# Patient Record
Sex: MALE | Race: WHITE | NOT HISPANIC OR LATINO | Employment: PART TIME | URBAN - METROPOLITAN AREA
[De-identification: names, ages, dates, MRNs, and addresses within clinical notes are randomized per-mention and may not be internally consistent; named-entity substitution may affect disease eponyms.]

---

## 2017-04-03 ENCOUNTER — HOSPITAL ENCOUNTER (OUTPATIENT)
Dept: FAMILY MEDICINE CLINIC | Facility: CLINIC | Age: 52
Setting detail: SPECIMEN
Discharge: HOME | End: 2017-04-03
Attending: FAMILY MEDICINE | Admitting: FAMILY MEDICINE

## 2017-04-03 LAB
ALBUMIN SERPL-MCNC: 4.3 G/DL (ref 3.5–4.8)
ALBUMIN/GLOB SERPL: 1.7 {RATIO} (ref 1–1.7)
ALP SERPL-CCNC: 70 IU/L (ref 32–91)
ALT SERPL-CCNC: 25 IU/L (ref 17–63)
ANION GAP SERPL CALC-SCNC: 14.8 MMOL/L (ref 10–20)
AST SERPL-CCNC: 25 IU/L (ref 15–41)
BASOPHILS # BLD AUTO: 0.1 10*3/UL (ref 0–0.2)
BASOPHILS NFR BLD AUTO: 1 % (ref 0–2)
BILIRUB SERPL-MCNC: 0.5 MG/DL (ref 0.3–1.2)
BUN SERPL-MCNC: 12 MG/DL (ref 8–20)
BUN/CREAT SERPL: 10 (ref 6.2–20.3)
CALCIUM SERPL-MCNC: 9.6 MG/DL (ref 8.9–10.3)
CHLORIDE SERPL-SCNC: 107 MMOL/L (ref 101–111)
CHOLEST SERPL-MCNC: 184 MG/DL
CHOLEST/HDLC SERPL: 4.8 {RATIO}
CONV CO2: 26 MMOL/L (ref 22–32)
CONV LDL CHOLESTEROL DIRECT: 105 MG/DL (ref 0–100)
CONV TOTAL PROTEIN: 6.9 G/DL (ref 6.1–7.9)
CREAT UR-MCNC: 1.2 MG/DL (ref 0.7–1.2)
DIFFERENTIAL METHOD BLD: (no result)
EOSINOPHIL # BLD AUTO: 0.3 10*3/UL (ref 0–0.3)
EOSINOPHIL # BLD AUTO: 6 % (ref 0–3)
ERYTHROCYTE [DISTWIDTH] IN BLOOD BY AUTOMATED COUNT: 13.5 % (ref 11.5–14.5)
GLOBULIN UR ELPH-MCNC: 2.6 G/DL (ref 2.5–3.8)
GLUCOSE SERPL-MCNC: 96 MG/DL (ref 65–99)
HCT VFR BLD AUTO: 48.1 % (ref 40–54)
HDLC SERPL-MCNC: 38 MG/DL
HGB BLD-MCNC: 16.4 G/DL (ref 14–18)
LDLC/HDLC SERPL: 2.7 {RATIO}
LIPID INTERPRETATION: ABNORMAL
LYMPHOCYTES # BLD AUTO: 2.2 10*3/UL (ref 0.8–4.8)
LYMPHOCYTES NFR BLD AUTO: 38 % (ref 18–42)
MCH RBC QN AUTO: 29.8 PG (ref 26–32)
MCHC RBC AUTO-ENTMCNC: 34.1 G/DL (ref 32–36)
MCV RBC AUTO: 87.2 FL (ref 80–94)
MONOCYTES # BLD AUTO: 0.5 10*3/UL (ref 0.1–1.3)
MONOCYTES NFR BLD AUTO: 8 % (ref 2–11)
NEUTROPHILS # BLD AUTO: 2.7 10*3/UL (ref 2.3–8.6)
NEUTROPHILS NFR BLD AUTO: 47 % (ref 50–75)
NRBC BLD AUTO-RTO: 0 /100{WBCS}
NRBC/RBC NFR BLD MANUAL: 0 10*3/UL
PLATELET # BLD AUTO: 185 10*3/UL (ref 150–450)
PMV BLD AUTO: 11.2 FL (ref 7.4–10.4)
POTASSIUM SERPL-SCNC: 4.8 MMOL/L (ref 3.6–5.1)
PSA SERPL-MCNC: 0.76 NG/ML (ref 0–4)
RBC # BLD AUTO: 5.51 10*6/UL (ref 4.6–6)
SODIUM SERPL-SCNC: 143 MMOL/L (ref 136–144)
T3FREE SERPL-MCNC: 3.69 PG/ML (ref 2.39–6.79)
T4 FREE SERPL-MCNC: 0.62 NG/DL (ref 0.58–1.64)
TRIGL SERPL-MCNC: 198 MG/DL
TSH SERPL-ACNC: 3.13 UIU/ML (ref 0.34–5.6)
VLDLC SERPL CALC-MCNC: 41.1 MG/DL
WBC # BLD AUTO: 5.7 10*3/UL (ref 4.5–11.5)

## 2017-05-05 ENCOUNTER — HOSPITAL ENCOUNTER (OUTPATIENT)
Dept: CARDIOLOGY | Facility: HOSPITAL | Age: 52
Discharge: HOME | End: 2017-05-05
Attending: INTERNAL MEDICINE | Admitting: INTERNAL MEDICINE

## 2018-01-25 ENCOUNTER — HOSPITAL ENCOUNTER (OUTPATIENT)
Dept: FAMILY MEDICINE CLINIC | Facility: CLINIC | Age: 53
Setting detail: SPECIMEN
Discharge: HOME | End: 2018-01-25

## 2018-01-25 LAB
INR PPP: 2.1 (ref 2–3)
PROTHROMBIN TIME: 21.8 SEC (ref 19.4–28.5)

## 2019-06-01 ENCOUNTER — TRANSCRIBE ORDERS (OUTPATIENT)
Dept: CARDIOLOGY | Facility: CLINIC | Age: 54
End: 2019-06-01

## 2019-06-01 DIAGNOSIS — I48.20 CHRONIC ATRIAL FIBRILLATION (HCC): Primary | ICD-10-CM

## 2019-10-28 PROBLEM — I10 HYPERTENSION: Status: ACTIVE | Noted: 2018-04-26

## 2019-10-28 PROBLEM — I42.2: Status: ACTIVE | Noted: 2017-03-29

## 2019-10-28 PROBLEM — G47.30 SLEEP APNEA: Status: ACTIVE | Noted: 2018-04-26

## 2019-10-28 PROBLEM — Z79.01 LONG TERM CURRENT USE OF ANTICOAGULANT: Status: ACTIVE | Noted: 2017-03-29

## 2019-10-28 PROBLEM — I48.20 ATRIAL FIBRILLATION, CHRONIC: Status: ACTIVE | Noted: 2017-03-29

## 2019-11-04 ENCOUNTER — APPOINTMENT (OUTPATIENT)
Dept: CARDIOLOGY | Facility: HOSPITAL | Age: 54
End: 2019-11-04

## 2019-11-06 ENCOUNTER — OFFICE VISIT (OUTPATIENT)
Dept: CARDIOLOGY | Facility: CLINIC | Age: 54
End: 2019-11-06

## 2019-11-06 ENCOUNTER — HOSPITAL ENCOUNTER (OUTPATIENT)
Dept: CARDIOLOGY | Facility: HOSPITAL | Age: 54
Discharge: HOME | End: 2019-11-06
Admitting: INTERNAL MEDICINE

## 2019-11-06 VITALS
WEIGHT: 250 LBS | DIASTOLIC BLOOD PRESSURE: 55 MMHG | SYSTOLIC BLOOD PRESSURE: 107 MMHG | BODY MASS INDEX: 35.79 KG/M2 | HEIGHT: 70 IN

## 2019-11-06 VITALS
WEIGHT: 256 LBS | HEART RATE: 71 BPM | DIASTOLIC BLOOD PRESSURE: 80 MMHG | OXYGEN SATURATION: 96 % | SYSTOLIC BLOOD PRESSURE: 123 MMHG | HEIGHT: 70 IN | BODY MASS INDEX: 36.65 KG/M2

## 2019-11-06 DIAGNOSIS — I48.20 CHRONIC ATRIAL FIBRILLATION (HCC): ICD-10-CM

## 2019-11-06 DIAGNOSIS — I42.2 PRIMARY IDIOPATHIC HYPERTROPHIC CARDIOMYOPATHY (HCC): ICD-10-CM

## 2019-11-06 DIAGNOSIS — I48.20 ATRIAL FIBRILLATION, CHRONIC (HCC): Primary | ICD-10-CM

## 2019-11-06 DIAGNOSIS — G47.33 OBSTRUCTIVE SLEEP APNEA SYNDROME: ICD-10-CM

## 2019-11-06 DIAGNOSIS — I10 ESSENTIAL HYPERTENSION: ICD-10-CM

## 2019-11-06 LAB
BH CV ECHO MEAS - ACS: 2.2 CM
BH CV ECHO MEAS - AO MAX PG (FULL): 3.1 MMHG
BH CV ECHO MEAS - AO MAX PG: 7.8 MMHG
BH CV ECHO MEAS - AO MEAN PG (FULL): 1.8 MMHG
BH CV ECHO MEAS - AO MEAN PG: 4.6 MMHG
BH CV ECHO MEAS - AO ROOT AREA: 6.8 CM^2
BH CV ECHO MEAS - AO ROOT DIAM: 2.9 CM
BH CV ECHO MEAS - AO V2 MAX: 139.4 CM/SEC
BH CV ECHO MEAS - AO V2 MEAN: 103.3 CM/SEC
BH CV ECHO MEAS - AO V2 VTI: 25.5 CM
BH CV ECHO MEAS - ASC AORTA: 3.2 CM
BH CV ECHO MEAS - AVA(I,A): 2.5 CM^2
BH CV ECHO MEAS - AVA(I,D): 2.5 CM^2
BH CV ECHO MEAS - AVA(V,A): 2.3 CM^2
BH CV ECHO MEAS - AVA(V,D): 2.3 CM^2
BH CV ECHO MEAS - EDV(CUBED): 110 ML
BH CV ECHO MEAS - EDV(MOD-SP4): 62.1 ML
BH CV ECHO MEAS - EDV(TEICH): 107.1 ML
BH CV ECHO MEAS - EF(CUBED): 82.9 %
BH CV ECHO MEAS - EF(MOD-SP4): 66.6 %
BH CV ECHO MEAS - EF(TEICH): 75.7 %
BH CV ECHO MEAS - ESV(CUBED): 18.8 ML
BH CV ECHO MEAS - ESV(MOD-SP4): 20.7 ML
BH CV ECHO MEAS - ESV(TEICH): 26.1 ML
BH CV ECHO MEAS - FS: 44.5 %
BH CV ECHO MEAS - IVS/LVPW: 1.6
BH CV ECHO MEAS - IVSD: 1.5 CM
BH CV ECHO MEAS - LA DIMENSION: 5.7 CM
BH CV ECHO MEAS - LA/AO: 1.9
BH CV ECHO MEAS - LV MASS(C)D: 232.4 GRAMS
BH CV ECHO MEAS - LV MAX PG: 4.7 MMHG
BH CV ECHO MEAS - LV MEAN PG: 2.7 MMHG
BH CV ECHO MEAS - LV V1 MAX: 108.4 CM/SEC
BH CV ECHO MEAS - LV V1 MEAN: 80.2 CM/SEC
BH CV ECHO MEAS - LV V1 VTI: 21.3 CM
BH CV ECHO MEAS - LVIDD: 4.8 CM
BH CV ECHO MEAS - LVIDS: 2.7 CM
BH CV ECHO MEAS - LVOT AREA: 3 CM^2
BH CV ECHO MEAS - LVOT DIAM: 2 CM
BH CV ECHO MEAS - LVPWD: 0.96 CM
BH CV ECHO MEAS - MV A MAX VEL: 24.8 CM/SEC
BH CV ECHO MEAS - MV DEC SLOPE: 485.9 CM/SEC^2
BH CV ECHO MEAS - MV DEC TIME: 0.17 SEC
BH CV ECHO MEAS - MV E MAX VEL: 83.4 CM/SEC
BH CV ECHO MEAS - MV E/A: 3.4
BH CV ECHO MEAS - PA ACC TIME: 0.13 SEC
BH CV ECHO MEAS - PA MAX PG (FULL): 0.61 MMHG
BH CV ECHO MEAS - PA MAX PG: 2.6 MMHG
BH CV ECHO MEAS - PA PR(ACCEL): 19.8 MMHG
BH CV ECHO MEAS - PA V2 MAX: 80.1 CM/SEC
BH CV ECHO MEAS - RV MAX PG: 2 MMHG
BH CV ECHO MEAS - RV MEAN PG: 1.3 MMHG
BH CV ECHO MEAS - RV V1 MAX: 70 CM/SEC
BH CV ECHO MEAS - RV V1 MEAN: 56.7 CM/SEC
BH CV ECHO MEAS - RV V1 VTI: 15.9 CM
BH CV ECHO MEAS - RVDD: 3.1 CM
BH CV ECHO MEAS - SV(AO): 172.7 ML
BH CV ECHO MEAS - SV(CUBED): 91.2 ML
BH CV ECHO MEAS - SV(LVOT): 63.5 ML
BH CV ECHO MEAS - SV(MOD-SP4): 41.4 ML
BH CV ECHO MEAS - SV(TEICH): 81 ML
MAXIMAL PREDICTED HEART RATE: 165 BPM
STRESS TARGET HR: 140 BPM

## 2019-11-06 PROCEDURE — 99213 OFFICE O/P EST LOW 20 MIN: CPT | Performed by: INTERNAL MEDICINE

## 2019-11-06 PROCEDURE — 93306 TTE W/DOPPLER COMPLETE: CPT | Performed by: INTERNAL MEDICINE

## 2019-11-06 PROCEDURE — 93000 ELECTROCARDIOGRAM COMPLETE: CPT | Performed by: INTERNAL MEDICINE

## 2019-11-06 PROCEDURE — 93306 TTE W/DOPPLER COMPLETE: CPT

## 2019-11-06 RX ORDER — LISINOPRIL 5 MG/1
TABLET ORAL DAILY
Refills: 3 | COMMUNITY
Start: 2019-08-28 | End: 2019-11-06 | Stop reason: SDUPTHER

## 2019-11-06 NOTE — PROGRESS NOTES
"    Subjective:     Encounter Date:11/06/2019      Patient ID: Gamaliel Vogt is a 54 y.o. male.    Chief Complaint:  History of Present Illness 53-year-old white male with history of chronic atrial fibrillation history of cardiomyopathy hypertension sleep apnea presents to my office for follow-up.  Patient is currently stable without symptoms of chest pain or shortness of breath at rest on exertion.  No complaint of any PND orthopnea.  No palpitations dizziness syncope or swelling of the feet.  He is taking medicine regularly.  He is also using a CPAP machine.  He is on warfarin for anticoagulation and has his primary care doctor follow his PT/INR.    The following portions of the patient's history were reviewed and updated as appropriate: allergies, current medications, past family history, past medical history, past social history, past surgical history and problem list.  Past Medical History:   Diagnosis Date   • Atrial fibrillation (CMS/HCC)    • Hypertension      History reviewed. No pertinent surgical history.  /80 (BP Location: Left arm, Patient Position: Sitting)   Pulse 71   Ht 177.8 cm (70\")   Wt 116 kg (256 lb)   SpO2 96%   BMI 36.73 kg/m²   History reviewed. No pertinent family history.    Current Outpatient Medications:   •  atenolol (TENORMIN) 25 MG tablet, Daily., Disp: , Rfl:   •  lisinopril (PRINIVIL,ZESTRIL) 20 MG tablet, LISINOPRIL 20 MG TABS, Disp: , Rfl:   •  LORazepam (ATIVAN) 1 MG tablet, LORAZEPAM 1 MG TABS, Disp: , Rfl:   •  warfarin (COUMADIN) 7.5 MG tablet, WARFARIN SODIUM 7.5 MG TABS, Disp: , Rfl:   No Known Allergies  Social History     Socioeconomic History   • Marital status:      Spouse name: Not on file   • Number of children: Not on file   • Years of education: Not on file   • Highest education level: Not on file   Tobacco Use   • Smoking status: Never Smoker   Substance and Sexual Activity   • Alcohol use: No     Frequency: Never     Review of Systems "   Constitution: Negative for fever and malaise/fatigue.   Cardiovascular: Negative for chest pain, dyspnea on exertion and palpitations.   Respiratory: Negative for cough and shortness of breath.    Skin: Negative for rash.   Gastrointestinal: Negative for abdominal pain, nausea and vomiting.   Neurological: Negative for focal weakness and headaches.   All other systems reviewed and are negative.             Objective:     Physical Exam   Constitutional: He appears well-developed and well-nourished.   HENT:   Head: Normocephalic and atraumatic.   Eyes: Conjunctivae are normal. No scleral icterus.   Neck: Normal range of motion. Neck supple. No JVD present. Carotid bruit is not present.   Cardiovascular: Normal rate, regular rhythm, S1 normal, S2 normal, normal heart sounds and intact distal pulses. PMI is not displaced.   Pulmonary/Chest: Effort normal and breath sounds normal. He has no wheezes. He has no rales.   Abdominal: Soft. Bowel sounds are normal.   Neurological: He is alert. He has normal strength.   Skin: Skin is warm and dry. No rash noted.       ECG 12 Lead  Date/Time: 11/6/2019 10:28 AM  Performed by: Yordy Millan MD  Authorized by: Yordy Millan MD   Comments: atrial fibrillation with controlled ventricular response  No new changes from previous EKG            Lab Review:       Assessment:          Diagnosis Plan   1. Atrial fibrillation, chronic     2. Primary idiopathic hypertrophic cardiomyopathy (CMS/HCC)     3. Essential hypertension     4. Obstructive sleep apnea syndrome            Plan:       Patient has chronic atrial fibrillation is currently stable on medications including beta-blockers and warfarin  Patient blood pressure and heart rate are stable  Patient's sleep apnea is controlled well with CPAP machine  Patient takes warfarin and keeps INR between 2.0-3.0.

## 2020-08-08 ENCOUNTER — LAB (OUTPATIENT)
Dept: LAB | Facility: HOSPITAL | Age: 55
End: 2020-08-08

## 2020-08-08 PROCEDURE — U0004 COV-19 TEST NON-CDC HGH THRU: HCPCS

## 2020-08-08 PROCEDURE — U0002 COVID-19 LAB TEST NON-CDC: HCPCS

## 2020-08-08 PROCEDURE — C9803 HOPD COVID-19 SPEC COLLECT: HCPCS

## 2020-08-10 ENCOUNTER — ANESTHESIA EVENT (OUTPATIENT)
Dept: PERIOP | Facility: HOSPITAL | Age: 55
End: 2020-08-10

## 2020-08-10 LAB
REF LAB TEST METHOD: NORMAL
SARS-COV-2 RNA RESP QL NAA+PROBE: NOT DETECTED

## 2020-08-10 NOTE — H&P
"    Patient Care Team:  Provider, No Known as PCP - Yordy Rodriguez MD as Consulting Physician (Cardiology)    Chief complaint Vision loss OS    HPI Recent loss of vision secondary to superior macula off retinal detachment    Review of Systems  Review of Systems   Constitution: Negative.   HENT: Negative.    Eyes: Negative.    Cardiovascular: Negative.    Respiratory: Negative.    Endocrine: Negative.    Skin: Negative.    Musculoskeletal: Negative.    Gastrointestinal: Negative.    Genitourinary: Negative.    Neurological: Negative.    Psychiatric/Behavioral: Negative.    Allergic/Immunologic: Negative.    All other systems reviewed and are negative.      Physical Exam  Physical Exam   Constitutional: He is oriented to person, place, and time.   HENT:   Head: Normocephalic.   Cardiovascular: Normal rate, regular rhythm and normal heart sounds.   Pulmonary/Chest: Effort normal and breath sounds normal.   Neurological: He is alert and oriented to person, place, and time. He has normal strength.       No Known Allergies    History  Past Medical History:   Diagnosis Date   • Atrial fibrillation (CMS/HCC)    • Hypertension    • Sleep apnea     CPap        No past surgical history on file.    Social History     Tobacco Use   • Smoking status: Never Smoker   Substance Use Topics   • Alcohol use: No     Frequency: Never   • Drug use: Not on file       No family history on file.    Vital Signs  Ht 180.3 cm (71\")   Wt 111 kg (245 lb)   BMI 34.17 kg/m²          Assessment:  (Macula off superior retinal detachment OS).     Plan:   (Vitrectomy fluid gas exchange and endolaser OS).       Howard S. Lazarus, MD  08/10/20  11:17    "

## 2020-08-11 ENCOUNTER — ANESTHESIA (OUTPATIENT)
Dept: PERIOP | Facility: HOSPITAL | Age: 55
End: 2020-08-11

## 2020-08-11 ENCOUNTER — HOSPITAL ENCOUNTER (OUTPATIENT)
Facility: HOSPITAL | Age: 55
Setting detail: HOSPITAL OUTPATIENT SURGERY
Discharge: HOME | End: 2020-08-11
Attending: OPHTHALMOLOGY | Admitting: OPHTHALMOLOGY

## 2020-08-11 VITALS
DIASTOLIC BLOOD PRESSURE: 93 MMHG | RESPIRATION RATE: 16 BRPM | HEIGHT: 70 IN | TEMPERATURE: 98 F | BODY MASS INDEX: 35.82 KG/M2 | OXYGEN SATURATION: 93 % | HEART RATE: 81 BPM | SYSTOLIC BLOOD PRESSURE: 141 MMHG | WEIGHT: 250.22 LBS

## 2020-08-11 PROCEDURE — C1784 OCULAR DEV, INTRAOP, DET RET: HCPCS | Performed by: OPHTHALMOLOGY

## 2020-08-11 PROCEDURE — 25010000002 DEXAMETHASONE PER 1 MG: Performed by: OPHTHALMOLOGY

## 2020-08-11 PROCEDURE — 93005 ELECTROCARDIOGRAM TRACING: CPT | Performed by: OPHTHALMOLOGY

## 2020-08-11 PROCEDURE — 25010000003 CEFAZOLIN PER 500 MG: Performed by: OPHTHALMOLOGY

## 2020-08-11 PROCEDURE — 25010000003 HYALURONIDASE OVINE 200 UNIT/ML SOLUTION: Performed by: OPHTHALMOLOGY

## 2020-08-11 PROCEDURE — 93010 ELECTROCARDIOGRAM REPORT: CPT | Performed by: INTERNAL MEDICINE

## 2020-08-11 PROCEDURE — 25010000002 PROPOFOL 10 MG/ML EMULSION: Performed by: STUDENT IN AN ORGANIZED HEALTH CARE EDUCATION/TRAINING PROGRAM

## 2020-08-11 RX ORDER — ATROPINE SULFATE 10 MG/ML
SOLUTION/ DROPS OPHTHALMIC AS NEEDED
Status: DISCONTINUED | OUTPATIENT
Start: 2020-08-11 | End: 2020-08-11 | Stop reason: HOSPADM

## 2020-08-11 RX ORDER — CEFAZOLIN SODIUM 1 G/3ML
INJECTION, POWDER, FOR SOLUTION INTRAMUSCULAR; INTRAVENOUS AS NEEDED
Status: DISCONTINUED | OUTPATIENT
Start: 2020-08-11 | End: 2020-08-11 | Stop reason: HOSPADM

## 2020-08-11 RX ORDER — DEXAMETHASONE SODIUM PHOSPHATE 4 MG/ML
INJECTION, SOLUTION INTRA-ARTICULAR; INTRALESIONAL; INTRAMUSCULAR; INTRAVENOUS; SOFT TISSUE AS NEEDED
Status: DISCONTINUED | OUTPATIENT
Start: 2020-08-11 | End: 2020-08-11 | Stop reason: HOSPADM

## 2020-08-11 RX ORDER — LIDOCAINE HYDROCHLORIDE 10 MG/ML
0.5 INJECTION, SOLUTION EPIDURAL; INFILTRATION; INTRACAUDAL; PERINEURAL ONCE AS NEEDED
Status: DISCONTINUED | OUTPATIENT
Start: 2020-08-11 | End: 2020-08-11 | Stop reason: HOSPADM

## 2020-08-11 RX ORDER — BALANCED SALT SOLUTION 6.4; .75; .48; .3; 3.9; 1.7 MG/ML; MG/ML; MG/ML; MG/ML; MG/ML; MG/ML
SOLUTION OPHTHALMIC AS NEEDED
Status: DISCONTINUED | OUTPATIENT
Start: 2020-08-11 | End: 2020-08-11 | Stop reason: HOSPADM

## 2020-08-11 RX ORDER — SODIUM CHLORIDE 0.9 % (FLUSH) 0.9 %
3-10 SYRINGE (ML) INJECTION AS NEEDED
Status: DISCONTINUED | OUTPATIENT
Start: 2020-08-11 | End: 2020-08-11 | Stop reason: HOSPADM

## 2020-08-11 RX ORDER — PROPOFOL 10 MG/ML
VIAL (ML) INTRAVENOUS AS NEEDED
Status: DISCONTINUED | OUTPATIENT
Start: 2020-08-11 | End: 2020-08-11 | Stop reason: SURG

## 2020-08-11 RX ORDER — SODIUM CHLORIDE, SODIUM LACTATE, POTASSIUM CHLORIDE, CALCIUM CHLORIDE 600; 310; 30; 20 MG/100ML; MG/100ML; MG/100ML; MG/100ML
20 INJECTION, SOLUTION INTRAVENOUS CONTINUOUS
Status: DISCONTINUED | OUTPATIENT
Start: 2020-08-11 | End: 2020-08-11 | Stop reason: HOSPADM

## 2020-08-11 RX ORDER — CIPROFLOXACIN HYDROCHLORIDE 3.5 MG/ML
1 SOLUTION/ DROPS TOPICAL
Status: COMPLETED | OUTPATIENT
Start: 2020-08-11 | End: 2020-08-11

## 2020-08-11 RX ORDER — SODIUM CHLORIDE 0.9 % (FLUSH) 0.9 %
3 SYRINGE (ML) INJECTION EVERY 12 HOURS SCHEDULED
Status: DISCONTINUED | OUTPATIENT
Start: 2020-08-11 | End: 2020-08-11 | Stop reason: HOSPADM

## 2020-08-11 RX ORDER — SODIUM CHLORIDE, SODIUM LACTATE, POTASSIUM CHLORIDE, CALCIUM CHLORIDE 600; 310; 30; 20 MG/100ML; MG/100ML; MG/100ML; MG/100ML
9 INJECTION, SOLUTION INTRAVENOUS CONTINUOUS PRN
Status: DISCONTINUED | OUTPATIENT
Start: 2020-08-11 | End: 2020-08-11 | Stop reason: HOSPADM

## 2020-08-11 RX ORDER — PHENYLEPHRINE HCL 2.5 %
1 DROPS OPHTHALMIC (EYE)
Status: COMPLETED | OUTPATIENT
Start: 2020-08-11 | End: 2020-08-11

## 2020-08-11 RX ORDER — CYCLOPENTOLATE HYDROCHLORIDE 10 MG/ML
1 SOLUTION/ DROPS OPHTHALMIC
Status: COMPLETED | OUTPATIENT
Start: 2020-08-11 | End: 2020-08-11

## 2020-08-11 RX ORDER — NEOMYCIN SULFATE, POLYMYXIN B SULFATE AND BACITRACIN ZINC 3.5; 10000; 4 MG/G; [USP'U]/G; [USP'U]/G
OINTMENT OPHTHALMIC AS NEEDED
Status: DISCONTINUED | OUTPATIENT
Start: 2020-08-11 | End: 2020-08-11 | Stop reason: HOSPADM

## 2020-08-11 RX ORDER — BUPIVACAINE HYDROCHLORIDE 7.5 MG/ML
INJECTION, SOLUTION EPIDURAL; RETROBULBAR AS NEEDED
Status: DISCONTINUED | OUTPATIENT
Start: 2020-08-11 | End: 2020-08-11 | Stop reason: HOSPADM

## 2020-08-11 RX ORDER — LIDOCAINE HYDROCHLORIDE 10 MG/ML
INJECTION, SOLUTION EPIDURAL; INFILTRATION; INTRACAUDAL; PERINEURAL AS NEEDED
Status: DISCONTINUED | OUTPATIENT
Start: 2020-08-11 | End: 2020-08-11 | Stop reason: SURG

## 2020-08-11 RX ADMIN — CIPROFLOXACIN 1 DROP: 3 SOLUTION OPHTHALMIC at 12:50

## 2020-08-11 RX ADMIN — PHENYLEPHRINE HYDROCHLORIDE 1 DROP: 25 SOLUTION/ DROPS OPHTHALMIC at 12:50

## 2020-08-11 RX ADMIN — PHENYLEPHRINE HYDROCHLORIDE 1 DROP: 25 SOLUTION/ DROPS OPHTHALMIC at 12:38

## 2020-08-11 RX ADMIN — CYCLOPENTOLATE HYDROCHLORIDE 1 DROP: 10 SOLUTION/ DROPS OPHTHALMIC at 12:44

## 2020-08-11 RX ADMIN — SODIUM CHLORIDE, SODIUM LACTATE, POTASSIUM CHLORIDE, AND CALCIUM CHLORIDE 20 ML/HR: 600; 310; 30; 20 INJECTION, SOLUTION INTRAVENOUS at 12:59

## 2020-08-11 RX ADMIN — CYCLOPENTOLATE HYDROCHLORIDE 1 DROP: 10 SOLUTION/ DROPS OPHTHALMIC at 12:50

## 2020-08-11 RX ADMIN — PROPOFOL 80 MG: 10 INJECTION, EMULSION INTRAVENOUS at 15:41

## 2020-08-11 RX ADMIN — PHENYLEPHRINE HYDROCHLORIDE 1 DROP: 25 SOLUTION/ DROPS OPHTHALMIC at 12:44

## 2020-08-11 RX ADMIN — CIPROFLOXACIN 1 DROP: 3 SOLUTION OPHTHALMIC at 12:44

## 2020-08-11 RX ADMIN — LIDOCAINE HYDROCHLORIDE 100 MG: 10 INJECTION, SOLUTION EPIDURAL; INFILTRATION; INTRACAUDAL; PERINEURAL at 15:40

## 2020-08-11 RX ADMIN — CYCLOPENTOLATE HYDROCHLORIDE 1 DROP: 10 SOLUTION/ DROPS OPHTHALMIC at 12:38

## 2020-08-11 RX ADMIN — CIPROFLOXACIN 1 DROP: 3 SOLUTION OPHTHALMIC at 12:37

## 2020-08-11 NOTE — OP NOTE
VITRECTOMY PARS PLANA 25GA  Procedure Report    Patient Name:  Gamaliel Vogt  YOB: 1965    Date of Surgery:  8/11/2020     Indications: Macula off rhegmatogenous retinal detachment    Pre-op Diagnosis:   Retinal detachment of left eye with multiple breaks [H33.022] of the left eye       Post-Op Diagnosis Codes:     * Retinal detachment of left eye with multiple breaks [H33.022] of the left eye    Procedure/CPT® Codes:      Procedure(s):  VITRECTOMY PARS PLANA  25GA  WITH ENDOLASER AND  FLUID GAS EXCHANGE LEFT EYE    No specimens were submitted.  There was negligible blood loss.:       Anesthesia: Monitored Anesthesia Care    Implants:    Nothing was implanted during the procedure    Complications: None    Description of Procedure: After obtaining informed consent, the patient was taken to the operating room where a peribulbar block was administered to the operative eye.  The patient was then prepped and draped in a the customary manner.  Using a 25 gauge entry system, an infusion cannula was inserted inferotemporally 3.5 mm posterior to the limbus.  Additional cannulas were inserted supratemporally superonasally in a similar manner.  A dual chandelier was inserted superiorly the retina was prolifically detached superiorly and shallowly inferiorly the detachment extended through the macula it spared several clock hours between 4 and 7.  There was a horseshoe tear at 11:00 smaller peripheral breaks were noted at 12 and 1.  A core vitrectomy was performed.  The posterior pole was then flattened with Perfluoron.  The peripheral vitreous was carefully shaved and the breaks were marked with endodiathermy.  The retina was completely flattened with n-perfluorooctane.  Endolaser photocoagulation was applied around the breaks and along the superior peripheral retina.  A fluid air exchange was performed.  Before removing the cannulas 50 mL of 20% of SF 6 gas was passed through the eye.  The cannulas and  chandeliers were removed.  Subconjunctival injections of Decadron and cefazolin were administered into the inferior fornix. A drop of atropine and triple antibiotic ointment were placed on the eye and it was covered with a patch and Campos shield.  The patient was taken to the postoperative recovery area in stable condition.       Howard S. Lazarus, MD     Date: 8/11/2020  Time: 16:35

## 2020-08-11 NOTE — ANESTHESIA PREPROCEDURE EVALUATION
Anesthesia Evaluation     Patient summary reviewed and Nursing notes reviewed   NPO Solid Status: > 8 hours  NPO Liquid Status: > 8 hours           Airway   Mallampati: II  TM distance: >3 FB  Neck ROM: full  Large neck circumference  Dental      Pulmonary    (+) sleep apnea,   Cardiovascular   Exercise tolerance: good (4-7 METS)    (+) hypertension, dysrhythmias Paroxysmal Atrial Fib,       Neuro/Psych  GI/Hepatic/Renal/Endo      Musculoskeletal     Abdominal    Substance History      OB/GYN          Other        ROS/Med Hx Other: H/o hypertrophic cardiomyopathy per pt, followed by manch.  Echo in computer from last year appears mostly normal.                  Anesthesia Plan    ASA 3     MAC     intravenous induction     Anesthetic plan, all risks, benefits, and alternatives have been provided, discussed and informed consent has been obtained with: patient.

## 2020-08-11 NOTE — DISCHARGE INSTRUCTIONS
Maintain facedown positioning  Leave patch on   Keep scheduled follow up with Dr. Lazarus tomorrow.  If severe pain call Dr. Lazarus  [phone]

## 2020-08-11 NOTE — ANESTHESIA POSTPROCEDURE EVALUATION
Patient: Gamaliel Vogt    Procedure Summary     Date:  08/11/20 Room / Location:  [facility] OR 05 / [facility] MAIN OR    Anesthesia Start:  1535 Anesthesia Stop:  1624    Procedure:  VITRECTOMY PARS PLANA  25GA  WITH ENDOLASER AND  FLUID GAS EXCHANGE LEFT EYE (Left Eye) Diagnosis:       Retinal detachment of left eye with multiple breaks      (Retinal detachment of left eye with multiple breaks [H33.022])    Surgeon:  Lazarus, Howard S., MD Provider:  Jarek Martinez MD    Anesthesia Type:  MAC ASA Status:  3          Anesthesia Type: MAC    Vitals  Vitals Value Taken Time   /93 8/11/2020  5:15 PM   Temp 98 °F (36.7 °C) 8/11/2020  4:20 PM   Pulse 81 8/11/2020  5:15 PM   Resp 16 8/11/2020  5:15 PM   SpO2 93 % 8/11/2020  5:15 PM           Post Anesthesia Care and Evaluation    Patient location during evaluation: PACU  Patient participation: complete - patient participated  Level of consciousness: awake  Pain score: 0 (See nurse's notes for pain score)  Pain management: adequate  Airway patency: patent  Anesthetic complications: No anesthetic complications  PONV Status: none  Cardiovascular status: acceptable  Respiratory status: acceptable  Hydration status: acceptable    Comments: Patient seen and examined postoperatively; vital signs stable; SpO2 greater than or equal to 90%; cardiopulmonary status stable; nausea/vomiting adequately controlled; pain adequately controlled; no apparent anesthesia complications; patient discharged from anesthesia care when discharge criteria were met

## 2020-11-12 ENCOUNTER — OFFICE VISIT (OUTPATIENT)
Dept: CARDIOLOGY | Facility: CLINIC | Age: 55
End: 2020-11-12

## 2020-11-12 VITALS
WEIGHT: 253 LBS | TEMPERATURE: 97.5 F | OXYGEN SATURATION: 99 % | DIASTOLIC BLOOD PRESSURE: 90 MMHG | SYSTOLIC BLOOD PRESSURE: 137 MMHG | BODY MASS INDEX: 36.22 KG/M2 | HEART RATE: 61 BPM | HEIGHT: 70 IN

## 2020-11-12 DIAGNOSIS — G47.33 OBSTRUCTIVE SLEEP APNEA SYNDROME: ICD-10-CM

## 2020-11-12 DIAGNOSIS — I48.20 ATRIAL FIBRILLATION, CHRONIC (HCC): Primary | ICD-10-CM

## 2020-11-12 DIAGNOSIS — I10 ESSENTIAL HYPERTENSION: ICD-10-CM

## 2020-11-12 DIAGNOSIS — I42.2 PRIMARY IDIOPATHIC HYPERTROPHIC CARDIOMYOPATHY (HCC): ICD-10-CM

## 2020-11-12 PROCEDURE — 99214 OFFICE O/P EST MOD 30 MIN: CPT | Performed by: INTERNAL MEDICINE

## 2020-11-12 NOTE — PROGRESS NOTES
"    Subjective:     Encounter Date:11/12/2020      Patient ID: Gamaliel Vogt is a 55 y.o. male.    Chief Complaint:  History of Present Illness 57-year-old white male with history of atrial fibrillation hypertension and sleep apnea presents to my office for follow-up.  Patient is currently stable without exertional chest pain or shortness of breath at rest on exertion.  No complains of any PND orthopnea.  No palpitation dizziness syncope or swelling of the feet.  Is taking his medicine regularly.  He does not smoke.  He is trying to exercise regularly follows a good diet.    The following portions of the patient's history were reviewed and updated as appropriate: allergies, current medications, past family history, past medical history, past social history, past surgical history and problem list.  Past Medical History:   Diagnosis Date   • Atrial fibrillation (CMS/HCC)    • Hypertension    • Sleep apnea     CPap      Past Surgical History:   Procedure Laterality Date   • VITRECTOMY PARS PLANA Left 8/11/2020    Procedure: VITRECTOMY PARS PLANA  25GA  WITH ENDOLASER AND  FLUID GAS EXCHANGE LEFT EYE;  Surgeon: Lazarus, Howard S., MD;  Location: [facility];  Service: Ophthalmology;  Laterality: Left;     /90 (BP Location: Left arm, Patient Position: Sitting)   Pulse 61   Temp 97.5 °F (36.4 °C)   Ht 177.8 cm (70\")   Wt 115 kg (253 lb)   SpO2 99%   BMI 36.30 kg/m²   History reviewed. No pertinent family history.    Current Outpatient Medications:   •  atenolol (TENORMIN) 25 MG tablet, Take 25 mg by mouth Daily., Disp: , Rfl:   •  LORazepam (ATIVAN) 1 MG tablet, Take 1 mg by mouth Every Morning. And if needed at night, but does not take 2nd dose often, Disp: , Rfl:   •  warfarin (COUMADIN) 7.5 MG tablet, Take  by mouth Every Night. 7.5mg 6 days  Wed 1/2 tab, Sun 1/2 tab                 LD 8/7, Disp: , Rfl:   No Known Allergies  Social History     Socioeconomic History   • Marital status:      Spouse " name: Not on file   • Number of children: Not on file   • Years of education: Not on file   • Highest education level: Not on file   Tobacco Use   • Smoking status: Never Smoker   • Smokeless tobacco: Never Used   Substance and Sexual Activity   • Alcohol use: No     Frequency: Never   • Sexual activity: Defer     Review of Systems   Constitution: Negative for fever and malaise/fatigue.   HENT: Negative for ear pain and nosebleeds.    Eyes: Negative for blurred vision and double vision.   Cardiovascular: Negative for chest pain, dyspnea on exertion and palpitations.   Respiratory: Negative for cough and shortness of breath.    Skin: Negative for rash.   Musculoskeletal: Negative for joint pain.   Gastrointestinal: Negative for abdominal pain, nausea and vomiting.   Neurological: Negative for focal weakness and headaches.   Psychiatric/Behavioral: Negative for depression. The patient is not nervous/anxious.    All other systems reviewed and are negative.             Objective:     Constitutional:       Appearance: Well-developed.   Eyes:      General: No scleral icterus.     Conjunctiva/sclera: Conjunctivae normal.      Pupils: Pupils are equal, round, and reactive to light.   HENT:      Head: Normocephalic and atraumatic.   Neck:      Musculoskeletal: Normal range of motion and neck supple.      Vascular: No carotid bruit or JVD.   Pulmonary:      Effort: Pulmonary effort is normal.      Breath sounds: Normal breath sounds. No wheezing. No rales.   Cardiovascular:      Normal rate. Regular rhythm.   Pulses:     Intact distal pulses.   Abdominal:      General: Bowel sounds are normal.      Palpations: Abdomen is soft.   Musculoskeletal: Normal range of motion.   Skin:     General: Skin is warm and dry.      Findings: No rash.   Neurological:      Mental Status: Alert.      Comments: No focal deficits       Procedures    Lab Review:       Assessment:          Diagnosis Plan   1. Atrial fibrillation, chronic (CMS/HCC)      2. Primary idiopathic hypertrophic cardiomyopathy (CMS/HCC)     3. Essential hypertension     4. Obstructive sleep apnea syndrome            Plan:       Patient has history of hypertrophic cardiomyopathy without any significant symptoms  Patient has history of chronic atrial fibrillation and is currently on beta-blockers and warfarin  Patient keeps his INR between 2.0-3.0  Patient blood pressure and heart rate stable  Patient's lipid levels are followed by the primary care doctor  Patient has sleep apnea and uses a CPAP machine.  Continue current medicines and follow-up in 6 months

## 2021-06-23 ENCOUNTER — OFFICE VISIT (OUTPATIENT)
Dept: CARDIOLOGY | Facility: CLINIC | Age: 56
End: 2021-06-23

## 2021-06-23 VITALS
BODY MASS INDEX: 31.78 KG/M2 | HEART RATE: 85 BPM | HEIGHT: 70 IN | WEIGHT: 222 LBS | DIASTOLIC BLOOD PRESSURE: 82 MMHG | SYSTOLIC BLOOD PRESSURE: 118 MMHG | OXYGEN SATURATION: 98 %

## 2021-06-23 DIAGNOSIS — G47.33 OBSTRUCTIVE SLEEP APNEA SYNDROME: ICD-10-CM

## 2021-06-23 DIAGNOSIS — I10 ESSENTIAL HYPERTENSION: ICD-10-CM

## 2021-06-23 DIAGNOSIS — E78.00 PURE HYPERCHOLESTEROLEMIA: ICD-10-CM

## 2021-06-23 DIAGNOSIS — I48.20 ATRIAL FIBRILLATION, CHRONIC (HCC): Primary | ICD-10-CM

## 2021-06-23 PROCEDURE — 99213 OFFICE O/P EST LOW 20 MIN: CPT | Performed by: INTERNAL MEDICINE

## 2021-06-23 RX ORDER — ATORVASTATIN CALCIUM 10 MG/1
TABLET, FILM COATED ORAL
COMMUNITY
Start: 2021-05-28 | End: 2022-08-29

## 2021-06-23 NOTE — PROGRESS NOTES
"    Subjective:     Encounter Date:06/23/2021      Patient ID: Gamaliel Vogt is a 55 y.o. male.    Chief Complaint:  History of Present Illness 55-year-old white male with history of hypertrophic cardiomyopathy hypertension sleep apnea and atrial fibrillation presents to my office for follow-up.  Patient is currently stable without any symptoms of chest pain or shortness of breath at rest on exertion.  No complaints any PND orthopnea.  No palpitation dizziness syncope or swelling of the feet but does take his medicine regularly but he does not smoke.    The following portions of the patient's history were reviewed and updated as appropriate: allergies, current medications, past family history, past medical history, past social history, past surgical history and problem list.  Past Medical History:   Diagnosis Date   • Atrial fibrillation (CMS/HCC)    • Hypertension    • Sleep apnea     CPap      Past Surgical History:   Procedure Laterality Date   • RETINAL DETACHMENT SURGERY     • VITRECTOMY PARS PLANA Left 8/11/2020    Procedure: VITRECTOMY PARS PLANA  25GA  WITH ENDOLASER AND  FLUID GAS EXCHANGE LEFT EYE;  Surgeon: Lazarus, Howard S., MD;  Location: [facility];  Service: Ophthalmology;  Laterality: Left;     /82 (BP Location: Left arm, Patient Position: Sitting)   Pulse 85   Ht 177.8 cm (70\")   Wt 101 kg (222 lb)   SpO2 98%   BMI 31.85 kg/m²   History reviewed. No pertinent family history.    Current Outpatient Medications:   •  atenolol (TENORMIN) 25 MG tablet, Take 25 mg by mouth Daily., Disp: , Rfl:   •  atorvastatin (LIPITOR) 10 MG tablet, , Disp: , Rfl:   •  LORazepam (ATIVAN) 1 MG tablet, Take 1 mg by mouth Every Morning. And if needed at night, but does not take 2nd dose often, Disp: , Rfl:   •  warfarin (COUMADIN) 7.5 MG tablet, Take  by mouth Every Night. 7.5mg 6 days  Wed 1/2 tab, Sun 1/2 tab                 LD 8/7, Disp: , Rfl:   No Known Allergies  Social History     Socioeconomic " History   • Marital status:      Spouse name: Not on file   • Number of children: Not on file   • Years of education: Not on file   • Highest education level: Not on file   Tobacco Use   • Smoking status: Never Smoker   • Smokeless tobacco: Never Used   Substance and Sexual Activity   • Alcohol use: No   • Sexual activity: Defer     Review of Systems   Constitutional: Negative for fever and malaise/fatigue.   Cardiovascular: Negative for chest pain, dyspnea on exertion, leg swelling and palpitations.   Respiratory: Negative for cough and shortness of breath.    Skin: Negative for rash.   Gastrointestinal: Negative for abdominal pain, nausea and vomiting.   Neurological: Negative for focal weakness, headaches, light-headedness and numbness.   All other systems reviewed and are negative.             Objective:     Constitutional:       Appearance: Well-developed.   Eyes:      General: No scleral icterus.     Conjunctiva/sclera: Conjunctivae normal.   HENT:      Head: Normocephalic and atraumatic.   Neck:      Vascular: No carotid bruit or JVD.   Pulmonary:      Effort: Pulmonary effort is normal.      Breath sounds: Normal breath sounds. No wheezing. No rales.   Cardiovascular:      Normal rate. Regular rhythm.   Pulses:     Intact distal pulses.   Abdominal:      General: Bowel sounds are normal.      Palpations: Abdomen is soft.   Musculoskeletal:      Cervical back: Normal range of motion and neck supple. Skin:     General: Skin is warm and dry.      Findings: No rash.   Neurological:      Mental Status: Alert.       Procedures    Lab Review:         MDM  1.  Atrial fibrillation  Patient has chronic atrial ablation is currently on atenolol and warfarin  2.  Sleep apnea  Patient has sleep apnea but does not use a CPAP machine  3.  Hypertension  Patient blood pressure currently stable on medication  4.  Hyperlipidemia  Patient lipid levels are followed by the primary care doctor.  And is on a statin.

## 2021-12-15 ENCOUNTER — APPOINTMENT (OUTPATIENT)
Dept: GENERAL RADIOLOGY | Facility: HOSPITAL | Age: 56
End: 2021-12-15

## 2021-12-15 ENCOUNTER — HOSPITAL ENCOUNTER (EMERGENCY)
Facility: HOSPITAL | Age: 56
Discharge: HOME | End: 2021-12-15
Admitting: EMERGENCY MEDICINE

## 2021-12-15 VITALS
HEART RATE: 69 BPM | BODY MASS INDEX: 31.48 KG/M2 | SYSTOLIC BLOOD PRESSURE: 136 MMHG | TEMPERATURE: 97.5 F | HEIGHT: 71 IN | RESPIRATION RATE: 18 BRPM | WEIGHT: 224.87 LBS | OXYGEN SATURATION: 98 % | DIASTOLIC BLOOD PRESSURE: 92 MMHG

## 2021-12-15 DIAGNOSIS — S16.1XXA STRAIN OF NECK MUSCLE, INITIAL ENCOUNTER: ICD-10-CM
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
## 2021-12-15 PROCEDURE — 72072 X-RAY EXAM THORAC SPINE 3VWS: CPT

## 2021-12-15 PROCEDURE — 72050 X-RAY EXAM NECK SPINE 4/5VWS: CPT

## 2021-12-15 PROCEDURE — 99283 EMERGENCY DEPT VISIT LOW MDM: CPT

## 2021-12-15 RX ORDER — LIDOCAINE 50 MG/G
1 PATCH TOPICAL ONCE
Status: DISCONTINUED | OUTPATIENT
Start: 2021-12-15 | End: 2021-12-15 | Stop reason: HOSPADM

## 2021-12-15 RX ORDER — LIDOCAINE 50 MG/G
1 PATCH TOPICAL EVERY 24 HOURS
Qty: 6 EACH | Refills: 0 | Status: SHIPPED | OUTPATIENT
Start: 2021-12-15 | End: 2022-01-20

## 2021-12-15 RX ORDER — METHOCARBAMOL 500 MG/1
500 TABLET, FILM COATED ORAL 4 TIMES DAILY
Qty: 12 TABLET | Refills: 0 | Status: SHIPPED | OUTPATIENT
Start: 2021-12-15 | End: 2022-01-20

## 2021-12-15 RX ORDER — METHOCARBAMOL 750 MG/1
750 TABLET, FILM COATED ORAL ONCE
Status: COMPLETED | OUTPATIENT
Start: 2021-12-15 | End: 2021-12-15

## 2021-12-15 RX ADMIN — METHOCARBAMOL 750 MG: 750 TABLET ORAL at 10:30

## 2021-12-15 RX ADMIN — LIDOCAINE 1 PATCH: 50 PATCH TOPICAL at 10:31
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
## 2021-12-15 NOTE — DISCHARGE INSTRUCTIONS
Take medication as prescribed.  Continue current home medications.  Rest.  Heating pad.  Gentle stretching and massage.  Follow-up with primary care provider as needed.  Return for new or worsening symptoms.
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
## 2022-01-20 ENCOUNTER — OFFICE VISIT (OUTPATIENT)
Dept: CARDIOLOGY | Facility: CLINIC | Age: 57
End: 2022-01-20

## 2022-01-20 VITALS
DIASTOLIC BLOOD PRESSURE: 80 MMHG | SYSTOLIC BLOOD PRESSURE: 136 MMHG | HEIGHT: 70 IN | HEART RATE: 76 BPM | OXYGEN SATURATION: 98 % | WEIGHT: 233 LBS | BODY MASS INDEX: 33.36 KG/M2

## 2022-01-20 DIAGNOSIS — G47.33 OBSTRUCTIVE SLEEP APNEA SYNDROME: ICD-10-CM

## 2022-01-20 DIAGNOSIS — I42.2 PRIMARY IDIOPATHIC HYPERTROPHIC CARDIOMYOPATHY: ICD-10-CM

## 2022-01-20 DIAGNOSIS — I48.20 ATRIAL FIBRILLATION, CHRONIC: Primary | ICD-10-CM

## 2022-01-20 DIAGNOSIS — I10 ESSENTIAL HYPERTENSION: ICD-10-CM

## 2022-01-20 DIAGNOSIS — E78.00 PURE HYPERCHOLESTEROLEMIA: ICD-10-CM

## 2022-01-20 PROCEDURE — 99214 OFFICE O/P EST MOD 30 MIN: CPT | Performed by: INTERNAL MEDICINE

## 2022-01-20 NOTE — PROGRESS NOTES
"    Subjective:     Encounter Date:01/20/2022      Patient ID: Gamaliel Vogt is a 56 y.o. male.    Chief Complaint:  History of Present Illness 56-year-old white male with history of hypertrophic cardiomyopathy obstructive apnea hypertension hyperlipidemia atrial fibrillation presents to my office for follow-up.  Patient is currently stable without any symptoms of chest pain or shortness of breath at rest on exertion.  No complains any PND orthopnea.  No palpitation dizziness syncope or swelling of the feet.  He uses his CPAP machine regularly.  He does not smoke anymore.  Exercise regularly follows a good diet.    The following portions of the patient's history were reviewed and updated as appropriate: allergies, current medications, past family history, past medical history, past social history, past surgical history and problem list.  Past Medical History:   Diagnosis Date   • Atrial fibrillation (HCC)    • Hypertension    • Sleep apnea     CPap      Past Surgical History:   Procedure Laterality Date   • RETINAL DETACHMENT SURGERY     • VITRECTOMY PARS PLANA Left 8/11/2020    Procedure: VITRECTOMY PARS PLANA  25GA  WITH ENDOLASER AND  FLUID GAS EXCHANGE LEFT EYE;  Surgeon: Lazarus, Howard S., MD;  Location: [facility] OR;  Service: Ophthalmology;  Laterality: Left;     /80 (BP Location: Left arm, Patient Position: Sitting)   Pulse 76   Ht 177.8 cm (70\")   Wt 106 kg (233 lb)   SpO2 98%   BMI 33.43 kg/m²   History reviewed. No pertinent family history.    Current Outpatient Medications:   •  atenolol (TENORMIN) 25 MG tablet, Take 25 mg by mouth Daily., Disp: , Rfl:   •  atorvastatin (LIPITOR) 10 MG tablet, , Disp: , Rfl:   •  LORazepam (ATIVAN) 1 MG tablet, Take 1 mg by mouth Every Morning. And if needed at night, but does not take 2nd dose often, Disp: , Rfl:   •  rivaroxaban (XARELTO) 20 MG tablet, Take 20 mg by mouth Daily., Disp: , Rfl:   No Known Allergies  Social History     Socioeconomic History "   • Marital status:    Tobacco Use   • Smoking status: Never Smoker   • Smokeless tobacco: Never Used   Substance and Sexual Activity   • Alcohol use: No   • Sexual activity: Defer     Review of Systems   Constitutional: Negative for fever and malaise/fatigue.   Cardiovascular: Negative for chest pain, dyspnea on exertion and palpitations.   Respiratory: Negative for cough and shortness of breath.    Skin: Negative for rash.   Gastrointestinal: Negative for abdominal pain, nausea and vomiting.   Neurological: Negative for focal weakness and headaches.   All other systems reviewed and are negative.             Objective:     Constitutional:       Appearance: Well-developed.   Eyes:      General: No scleral icterus.     Conjunctiva/sclera: Conjunctivae normal.   HENT:      Head: Normocephalic and atraumatic.   Neck:      Vascular: No carotid bruit or JVD.   Pulmonary:      Effort: Pulmonary effort is normal.      Breath sounds: Normal breath sounds. No wheezing. No rales.   Cardiovascular:      Normal rate. Regular rhythm.   Pulses:     Intact distal pulses.   Abdominal:      General: Bowel sounds are normal.      Palpations: Abdomen is soft.   Musculoskeletal:      Cervical back: Normal range of motion and neck supple. Skin:     General: Skin is warm and dry.      Findings: No rash.   Neurological:      Mental Status: Alert.       Procedures    Lab Review:         MDM  1.  Atrial fibrillation  Patient has permanent atrial fibrillation is currently on atenolol and Xarelto for anticoagulation  2.  Hypertension  .  Blood pressure currently stable on Celexa No. 3 hyperlipidemia  Patient is on Lipitor and the lipid levels are followed by the primary care doctor  4.  Sleep apnea  Patient is sleep apnea and uses a CPAP machine  5.  Cardiomyopathy  Patient has history of hypertrophic cardiomyopathy and is currently stable      Patient's previous medical records, labs, and EKG were reviewed and discussed with the  patient at today's visit.

## 2022-08-29 ENCOUNTER — OFFICE VISIT (OUTPATIENT)
Dept: CARDIOLOGY | Facility: CLINIC | Age: 57
End: 2022-08-29

## 2022-08-29 VITALS
BODY MASS INDEX: 33.64 KG/M2 | WEIGHT: 235 LBS | OXYGEN SATURATION: 98 % | DIASTOLIC BLOOD PRESSURE: 82 MMHG | SYSTOLIC BLOOD PRESSURE: 125 MMHG | HEART RATE: 71 BPM | HEIGHT: 70 IN

## 2022-08-29 DIAGNOSIS — I42.2 PRIMARY IDIOPATHIC HYPERTROPHIC CARDIOMYOPATHY: ICD-10-CM

## 2022-08-29 DIAGNOSIS — I48.20 ATRIAL FIBRILLATION, CHRONIC: Primary | ICD-10-CM

## 2022-08-29 DIAGNOSIS — I10 ESSENTIAL HYPERTENSION: ICD-10-CM

## 2022-08-29 DIAGNOSIS — E78.00 PURE HYPERCHOLESTEROLEMIA: ICD-10-CM

## 2022-08-29 DIAGNOSIS — G47.33 OBSTRUCTIVE SLEEP APNEA SYNDROME: ICD-10-CM

## 2022-08-29 PROCEDURE — 99214 OFFICE O/P EST MOD 30 MIN: CPT | Performed by: INTERNAL MEDICINE

## 2022-08-29 NOTE — PROGRESS NOTES
"    Subjective:     Encounter Date:08/29/2022      Patient ID: Gamaliel Vogt is a 57 y.o. male.    Chief Complaint:  History of Present Illness 57-year-old white male with history of hypertrophic cardiomyopathy hypertension hyperlipidemia sleep apnea and atrial fibrillation presents to my office for follow-up.  Patient is currently stable without any symptoms of chest pain or shortness of breath at rest on exertion.  No complains any PND orthopnea.  No palpitation dizziness syncope or swelling of the feet.  Patient has been taking all medicines regularly.  Patient does not smoke.  Patient is trying to exercise regularly.  He follows a good diet.  He is also using his CPAP machine regularly.    The following portions of the patient's history were reviewed and updated as appropriate: allergies, current medications, past family history, past medical history, past social history, past surgical history and problem list.  Past Medical History:   Diagnosis Date   • Abnormal ECG    • Arrhythmia    • Asthma    • Atrial fibrillation (HCC)    • Congenital heart disease    • Coronary artery disease    • Heart murmur    • Hyperlipidemia    • Hypertension    • Sleep apnea     CPap      Past Surgical History:   Procedure Laterality Date   • RETINAL DETACHMENT SURGERY     • VITRECTOMY PARS PLANA Left 08/11/2020    Procedure: VITRECTOMY PARS PLANA  25GA  WITH ENDOLASER AND  FLUID GAS EXCHANGE LEFT EYE;  Surgeon: Lazarus, Howard S., MD;  Location: [facility] MAIN OR;  Service: Ophthalmology;  Laterality: Left;     /82 (BP Location: Left arm, Patient Position: Sitting, Cuff Size: Adult)   Pulse 71   Ht 177.8 cm (70\")   Wt 107 kg (235 lb)   SpO2 98%   BMI 33.72 kg/m²   Family History   Problem Relation Age of Onset   • Asthma Mother    • Heart disease Mother    • Arrhythmia Brother    • Heart attack Brother    • Heart disease Brother        Current Outpatient Medications:   •  atenolol (TENORMIN) 25 MG tablet, Take 25 mg by " mouth Daily., Disp: , Rfl:   •  LORazepam (ATIVAN) 1 MG tablet, Take 1 mg by mouth Every Morning. And if needed at night, but does not take 2nd dose often, Disp: , Rfl:   •  rivaroxaban (XARELTO) 20 MG tablet, Take 20 mg by mouth Daily., Disp: , Rfl:   No Known Allergies  Social History     Socioeconomic History   • Marital status:    Tobacco Use   • Smoking status: Never Smoker   • Smokeless tobacco: Never Used   Vaping Use   • Vaping Use: Never used   Substance and Sexual Activity   • Alcohol use: No   • Drug use: Never   • Sexual activity: Yes     Partners: Female     Review of Systems   Constitutional: Negative for fever and malaise/fatigue.   Cardiovascular: Negative for chest pain, dyspnea on exertion and palpitations.   Respiratory: Negative for cough and shortness of breath.    Skin: Negative for rash.   Gastrointestinal: Negative for abdominal pain, nausea and vomiting.   Neurological: Negative for focal weakness and headaches.   All other systems reviewed and are negative.             Objective:     Constitutional:       Appearance: Well-developed.   Eyes:      General: No scleral icterus.     Conjunctiva/sclera: Conjunctivae normal.   HENT:      Head: Normocephalic and atraumatic.   Neck:      Vascular: No carotid bruit or JVD.   Pulmonary:      Effort: Pulmonary effort is normal.      Breath sounds: Normal breath sounds. No wheezing. No rales.   Cardiovascular:      Normal rate. Regular rhythm.   Pulses:     Intact distal pulses.   Abdominal:      General: Bowel sounds are normal.      Palpations: Abdomen is soft.   Musculoskeletal:      Cervical back: Normal range of motion and neck supple. Skin:     General: Skin is warm and dry.      Findings: No rash.   Neurological:      Mental Status: Alert.       Procedures    Lab Review:         MDM  1.  Cardiomyopathy  Patient has history of cardiomyopathy and will have an echocardiogram at the next visit to assess the hypertrophic cardiomyopathy  2.   Hypertension  Patient blood pressure currently stable on atenolol  #3 hyperlipidemia  Patient's lipid levels are followed by the primary care doctor and was on a statin in the past  4.  Atrial fibrillation  Patient has chronic atrial fibrillation is currently on Xarelto and atenolol for rate control.  5.  Sleep apnea  Patient has sleep apnea and uses a CPAP machine      Patient's previous medical records, labs, and EKG were reviewed and discussed with the patient at today's visit.

## 2023-08-29 ENCOUNTER — OFFICE VISIT (OUTPATIENT)
Dept: CARDIOLOGY | Facility: CLINIC | Age: 58
End: 2023-08-29
Payer: COMMERCIAL

## 2023-08-29 ENCOUNTER — HOSPITAL ENCOUNTER (OUTPATIENT)
Dept: CARDIOLOGY | Facility: HOSPITAL | Age: 58
Discharge: HOME | End: 2023-08-29
Admitting: INTERNAL MEDICINE
Payer: COMMERCIAL

## 2023-08-29 VITALS
OXYGEN SATURATION: 99 % | HEIGHT: 70 IN | DIASTOLIC BLOOD PRESSURE: 96 MMHG | WEIGHT: 239 LBS | HEART RATE: 65 BPM | BODY MASS INDEX: 34.22 KG/M2 | SYSTOLIC BLOOD PRESSURE: 148 MMHG

## 2023-08-29 VITALS
BODY MASS INDEX: 33.64 KG/M2 | HEART RATE: 79 BPM | WEIGHT: 235 LBS | HEIGHT: 70 IN | SYSTOLIC BLOOD PRESSURE: 127 MMHG | DIASTOLIC BLOOD PRESSURE: 91 MMHG

## 2023-08-29 DIAGNOSIS — G47.33 OBSTRUCTIVE SLEEP APNEA SYNDROME: ICD-10-CM

## 2023-08-29 DIAGNOSIS — I48.20 ATRIAL FIBRILLATION, CHRONIC: ICD-10-CM

## 2023-08-29 DIAGNOSIS — I10 ESSENTIAL HYPERTENSION: ICD-10-CM

## 2023-08-29 DIAGNOSIS — I42.2 PRIMARY IDIOPATHIC HYPERTROPHIC CARDIOMYOPATHY: ICD-10-CM

## 2023-08-29 DIAGNOSIS — E78.00 PURE HYPERCHOLESTEROLEMIA: ICD-10-CM

## 2023-08-29 DIAGNOSIS — I48.20 ATRIAL FIBRILLATION, CHRONIC: Primary | ICD-10-CM

## 2023-08-29 LAB
BH CV ECHO MEAS - ACS: 1.99 CM
BH CV ECHO MEAS - AO MAX PG: 7.7 MMHG
BH CV ECHO MEAS - AO MEAN PG: 4 MMHG
BH CV ECHO MEAS - AO ROOT DIAM: 3.2 CM
BH CV ECHO MEAS - AO V2 MAX: 138.6 CM/SEC
BH CV ECHO MEAS - AO V2 VTI: 28.5 CM
BH CV ECHO MEAS - AVA(I,D): 2.16 CM2
BH CV ECHO MEAS - EDV(CUBED): 96.1 ML
BH CV ECHO MEAS - EDV(MOD-SP4): 78.4 ML
BH CV ECHO MEAS - EF(MOD-BP): 56 %
BH CV ECHO MEAS - EF(MOD-SP4): 56.3 %
BH CV ECHO MEAS - ESV(CUBED): 40.6 ML
BH CV ECHO MEAS - ESV(MOD-SP4): 34.3 ML
BH CV ECHO MEAS - FS: 24.9 %
BH CV ECHO MEAS - IVS/LVPW: 0.99 CM
BH CV ECHO MEAS - IVSD: 1.2 CM
BH CV ECHO MEAS - LA DIMENSION: 4.9 CM
BH CV ECHO MEAS - LV MASS(C)D: 205.2 GRAMS
BH CV ECHO MEAS - LV MAX PG: 4.1 MMHG
BH CV ECHO MEAS - LV MEAN PG: 2.22 MMHG
BH CV ECHO MEAS - LV V1 MAX: 101.6 CM/SEC
BH CV ECHO MEAS - LV V1 VTI: 19 CM
BH CV ECHO MEAS - LVIDD: 4.6 CM
BH CV ECHO MEAS - LVIDS: 3.4 CM
BH CV ECHO MEAS - LVOT AREA: 3.2 CM2
BH CV ECHO MEAS - LVOT DIAM: 2.03 CM
BH CV ECHO MEAS - LVPWD: 1.21 CM
BH CV ECHO MEAS - MR MAX PG: 95.3 MMHG
BH CV ECHO MEAS - MR MAX VEL: 487.9 CM/SEC
BH CV ECHO MEAS - MV E MAX VEL: 86.4 CM/SEC
BH CV ECHO MEAS - MV MAX PG: 4.1 MMHG
BH CV ECHO MEAS - MV MEAN PG: 1.89 MMHG
BH CV ECHO MEAS - MV V2 VTI: 22.5 CM
BH CV ECHO MEAS - MVA(VTI): 2.7 CM2
BH CV ECHO MEAS - PA V2 MAX: 105.1 CM/SEC
BH CV ECHO MEAS - PI END-D VEL: 118.4 CM/SEC
BH CV ECHO MEAS - RV MAX PG: 1.05 MMHG
BH CV ECHO MEAS - RV V1 MAX: 51.2 CM/SEC
BH CV ECHO MEAS - RV V1 VTI: 11.6 CM
BH CV ECHO MEAS - SV(LVOT): 61.6 ML
BH CV ECHO MEAS - SV(MOD-SP4): 44.2 ML
BH CV ECHO MEAS - TR MAX PG: 20.1 MMHG
BH CV ECHO MEAS - TR MAX VEL: 221 CM/SEC

## 2023-08-29 PROCEDURE — 93306 TTE W/DOPPLER COMPLETE: CPT | Performed by: INTERNAL MEDICINE

## 2023-08-29 PROCEDURE — 93306 TTE W/DOPPLER COMPLETE: CPT

## 2023-08-29 PROCEDURE — 99214 OFFICE O/P EST MOD 30 MIN: CPT | Performed by: INTERNAL MEDICINE

## 2023-08-29 NOTE — PROGRESS NOTES
"    Subjective:     Encounter Date:08/29/2023      Patient ID: Gamaliel Vogt is a 58 y.o. male.    Chief Complaint:  History of Present Illness 58-year-old white male with history of cardiomyopathy hypertension hyperlipidemia sleep apnea and atrial fibrillation presents to my office for a follow-up.  Patient is currently stable without any symptoms of chest pain or shortness of breath at rest or exertion.  No complaint of any PND orthopnea.  No palpitation dizziness syncope or swelling of the feet.  Patient has been taking all his medicines regularly patient does not smoke.  Uses CPAP machine.    The following portions of the patient's history were reviewed and updated as appropriate: allergies, current medications, past family history, past medical history, past social history, past surgical history, and problem list.  Past Medical History:   Diagnosis Date    Abnormal ECG     Arrhythmia     Asthma     Atrial fibrillation     Congenital heart disease     Coronary artery disease     Heart murmur     Hyperlipidemia     Hypertension     Sleep apnea     CPap      Past Surgical History:   Procedure Laterality Date    RETINAL DETACHMENT SURGERY      VITRECTOMY PARS PLANA Left 08/11/2020    Procedure: VITRECTOMY PARS PLANA  25GA  WITH ENDOLASER AND  FLUID GAS EXCHANGE LEFT EYE;  Surgeon: Lazarus, Howard S., MD;  Location: [facility];  Service: Ophthalmology;  Laterality: Left;     /96 (BP Location: Right arm, Patient Position: Sitting, Cuff Size: Adult)   Pulse 65   Ht 177.8 cm (70\")   Wt 108 kg (239 lb)   SpO2 99%   BMI 34.29 kg/mý   Family History   Problem Relation Age of Onset    Asthma Mother     Heart disease Mother     No Known Problems Father     No Known Problems Sister     Arrhythmia Brother     Heart attack Brother     Heart disease Brother     No Known Problems Maternal Aunt     No Known Problems Maternal Uncle     No Known Problems Paternal Aunt     No Known Problems Paternal Uncle     No " Known Problems Maternal Grandmother     No Known Problems Maternal Grandfather     No Known Problems Paternal Grandmother     No Known Problems Paternal Grandfather     No Known Problems Other     Anemia Neg Hx     Clotting disorder Neg Hx     Fainting Neg Hx     Heart failure Neg Hx     Hyperlipidemia Neg Hx     Hypertension Neg Hx        Current Outpatient Medications:     atenolol (TENORMIN) 25 MG tablet, Take 1 tablet by mouth Daily., Disp: , Rfl:     LORazepam (ATIVAN) 1 MG tablet, Take 1 tablet by mouth Every Morning. And if needed at night, but does not take 2nd dose often, Disp: , Rfl:     rivaroxaban (XARELTO) 20 MG tablet, Take 1 tablet by mouth Daily., Disp: , Rfl:   No Known Allergies  Social History     Socioeconomic History    Marital status:    Tobacco Use    Smoking status: Never     Passive exposure: Never    Smokeless tobacco: Never   Vaping Use    Vaping Use: Never used   Substance and Sexual Activity    Alcohol use: No    Drug use: Never    Sexual activity: Yes     Partners: Female     Review of Systems   Constitutional: Negative for malaise/fatigue.   Cardiovascular:  Negative for chest pain, dyspnea on exertion, leg swelling and palpitations.   Respiratory:  Negative for cough and shortness of breath.    Gastrointestinal:  Negative for abdominal pain, nausea and vomiting.   Neurological:  Negative for dizziness, focal weakness, headaches, light-headedness and numbness.   All other systems reviewed and are negative.           Objective:     Constitutional:       Appearance: Well-developed.   Eyes:      General: No scleral icterus.     Conjunctiva/sclera: Conjunctivae normal.   HENT:      Head: Normocephalic and atraumatic.   Neck:      Vascular: No carotid bruit or JVD.   Pulmonary:      Effort: Pulmonary effort is normal.      Breath sounds: Normal breath sounds. No wheezing. No rales.   Cardiovascular:      Normal rate. Regular rhythm.   Pulses:     Intact distal pulses.   Abdominal:       General: Bowel sounds are normal.      Palpations: Abdomen is soft.   Musculoskeletal:      Cervical back: Normal range of motion and neck supple. Skin:     General: Skin is warm and dry.      Findings: No rash.   Neurological:      Mental Status: Alert.     Procedures    Lab Review:         MDM    #1 atrial fibrillation  Patient has history of atrial fibrillation and is currently on atenolol and Xarelto for anticoagulation and is currently stable  2.  Mitral regurgitation  Patient has moderate to severe mitral regurgitation with normal function and will adjust his medicines accordingly  3.  Hypertension  Patient blood pressure slightly high here but I will adjust his medicines once I have his readings at home  4.  Hyperlipidemia  Patient has been on over-the-counter medicines  5.  Sleep apnea  Patient sleep apnea and uses a CPAP machine.    Patient's previous medical records, labs, and EKG were reviewed and discussed with the patient at today's visit.

## 2023-09-29 ENCOUNTER — HOSPITAL ENCOUNTER (OUTPATIENT)
Facility: HOSPITAL | Age: 58
Discharge: HOME | End: 2023-10-01
Attending: EMERGENCY MEDICINE | Admitting: EMERGENCY MEDICINE
Payer: COMMERCIAL

## 2023-09-29 ENCOUNTER — APPOINTMENT (OUTPATIENT)
Dept: GENERAL RADIOLOGY | Facility: HOSPITAL | Age: 58
End: 2023-09-29
Payer: COMMERCIAL

## 2023-09-29 DIAGNOSIS — R07.9 CHEST PAIN, UNSPECIFIED TYPE: Primary | ICD-10-CM

## 2023-09-29 DIAGNOSIS — R94.39 ABNORMAL NUCLEAR STRESS TEST: ICD-10-CM

## 2023-09-29 LAB
ALBUMIN SERPL-MCNC: 4.9 G/DL (ref 3.5–5.2)
ALBUMIN/GLOB SERPL: 1.8 G/DL
ALP SERPL-CCNC: 70 U/L (ref 39–117)
ALT SERPL W P-5'-P-CCNC: 26 U/L (ref 1–41)
ANION GAP SERPL CALCULATED.3IONS-SCNC: 10 MMOL/L (ref 5–15)
APTT PPP: 32.1 SECONDS (ref 61–76.5)
AST SERPL-CCNC: 31 U/L (ref 1–40)
BASOPHILS # BLD AUTO: 0 10*3/MM3 (ref 0–0.2)
BASOPHILS NFR BLD AUTO: 0.3 % (ref 0–1.5)
BILIRUB SERPL-MCNC: 1.3 MG/DL (ref 0–1.2)
BUN SERPL-MCNC: 16 MG/DL (ref 6–20)
BUN/CREAT SERPL: 14.4 (ref 7–25)
CALCIUM SPEC-SCNC: 10.5 MG/DL (ref 8.6–10.5)
CHLORIDE SERPL-SCNC: 102 MMOL/L (ref 98–107)
CHOLEST SERPL-MCNC: 177 MG/DL (ref 0–200)
CO2 SERPL-SCNC: 30 MMOL/L (ref 22–29)
CREAT SERPL-MCNC: 1.11 MG/DL (ref 0.76–1.27)
DEPRECATED RDW RBC AUTO: 42.9 FL (ref 37–54)
EGFRCR SERPLBLD CKD-EPI 2021: 76.5 ML/MIN/1.73
EOSINOPHIL # BLD AUTO: 0.3 10*3/MM3 (ref 0–0.4)
EOSINOPHIL NFR BLD AUTO: 4.6 % (ref 0.3–6.2)
ERYTHROCYTE [DISTWIDTH] IN BLOOD BY AUTOMATED COUNT: 13.3 % (ref 12.3–15.4)
GEN 5 2HR TROPONIN T REFLEX: 20 NG/L
GLOBULIN UR ELPH-MCNC: 2.7 GM/DL
GLUCOSE SERPL-MCNC: 81 MG/DL (ref 65–99)
HBA1C MFR BLD: 5.6 % (ref 4.8–5.6)
HCT VFR BLD AUTO: 47.8 % (ref 37.5–51)
HDLC SERPL-MCNC: 48 MG/DL (ref 40–60)
HGB BLD-MCNC: 15.9 G/DL (ref 13–17.7)
INR PPP: 1.1 (ref 0.93–1.1)
LDLC SERPL CALC-MCNC: 112 MG/DL (ref 0–100)
LDLC/HDLC SERPL: 2.3 {RATIO}
LYMPHOCYTES # BLD AUTO: 2.1 10*3/MM3 (ref 0.7–3.1)
LYMPHOCYTES NFR BLD AUTO: 28 % (ref 19.6–45.3)
MCH RBC QN AUTO: 31 PG (ref 26.6–33)
MCHC RBC AUTO-ENTMCNC: 33.2 G/DL (ref 31.5–35.7)
MCV RBC AUTO: 93.5 FL (ref 79–97)
MONOCYTES # BLD AUTO: 0.8 10*3/MM3 (ref 0.1–0.9)
MONOCYTES NFR BLD AUTO: 10.3 % (ref 5–12)
NEUTROPHILS NFR BLD AUTO: 4.3 10*3/MM3 (ref 1.7–7)
NEUTROPHILS NFR BLD AUTO: 56.8 % (ref 42.7–76)
NRBC BLD AUTO-RTO: 0.1 /100 WBC (ref 0–0.2)
NT-PROBNP SERPL-MCNC: 962.6 PG/ML (ref 0–900)
PLATELET # BLD AUTO: 216 10*3/MM3 (ref 140–450)
PMV BLD AUTO: 10.1 FL (ref 6–12)
POTASSIUM SERPL-SCNC: 4.5 MMOL/L (ref 3.5–5.2)
PROT SERPL-MCNC: 7.6 G/DL (ref 6–8.5)
PROTHROMBIN TIME: 11.9 SECONDS (ref 9.6–11.7)
RBC # BLD AUTO: 5.11 10*6/MM3 (ref 4.14–5.8)
SODIUM SERPL-SCNC: 142 MMOL/L (ref 136–145)
T4 FREE SERPL-MCNC: 0.8 NG/DL (ref 0.93–1.7)
TRIGL SERPL-MCNC: 92 MG/DL (ref 0–150)
TROPONIN T DELTA: -1 NG/L
TROPONIN T SERPL HS-MCNC: 21 NG/L
TSH SERPL DL<=0.05 MIU/L-ACNC: 2.31 UIU/ML (ref 0.27–4.2)
URATE SERPL-MCNC: 7.3 MG/DL (ref 3.4–7)
VLDLC SERPL-MCNC: 17 MG/DL (ref 5–40)
WBC NRBC COR # BLD: 7.5 10*3/MM3 (ref 3.4–10.8)

## 2023-09-29 PROCEDURE — 80050 GENERAL HEALTH PANEL: CPT | Performed by: EMERGENCY MEDICINE

## 2023-09-29 PROCEDURE — G0378 HOSPITAL OBSERVATION PER HR: HCPCS

## 2023-09-29 PROCEDURE — 80061 LIPID PANEL: CPT | Performed by: NURSE PRACTITIONER

## 2023-09-29 PROCEDURE — 85610 PROTHROMBIN TIME: CPT | Performed by: EMERGENCY MEDICINE

## 2023-09-29 PROCEDURE — 83880 ASSAY OF NATRIURETIC PEPTIDE: CPT | Performed by: NURSE PRACTITIONER

## 2023-09-29 PROCEDURE — 25010000002 ENOXAPARIN PER 10 MG: Performed by: NURSE PRACTITIONER

## 2023-09-29 PROCEDURE — 84550 ASSAY OF BLOOD/URIC ACID: CPT | Performed by: NURSE PRACTITIONER

## 2023-09-29 PROCEDURE — 83036 HEMOGLOBIN GLYCOSYLATED A1C: CPT | Performed by: NURSE PRACTITIONER

## 2023-09-29 PROCEDURE — 85730 THROMBOPLASTIN TIME PARTIAL: CPT | Performed by: EMERGENCY MEDICINE

## 2023-09-29 PROCEDURE — 71045 X-RAY EXAM CHEST 1 VIEW: CPT

## 2023-09-29 PROCEDURE — 96372 THER/PROPH/DIAG INJ SC/IM: CPT

## 2023-09-29 PROCEDURE — 84484 ASSAY OF TROPONIN QUANT: CPT | Performed by: EMERGENCY MEDICINE

## 2023-09-29 PROCEDURE — 93005 ELECTROCARDIOGRAM TRACING: CPT

## 2023-09-29 PROCEDURE — 84439 ASSAY OF FREE THYROXINE: CPT | Performed by: NURSE PRACTITIONER

## 2023-09-29 PROCEDURE — 99284 EMERGENCY DEPT VISIT MOD MDM: CPT

## 2023-09-29 PROCEDURE — 93005 ELECTROCARDIOGRAM TRACING: CPT | Performed by: EMERGENCY MEDICINE

## 2023-09-29 RX ORDER — SODIUM CHLORIDE 0.9 % (FLUSH) 0.9 %
10 SYRINGE (ML) INJECTION AS NEEDED
Status: DISCONTINUED | OUTPATIENT
Start: 2023-09-29 | End: 2023-10-01 | Stop reason: HOSPADM

## 2023-09-29 RX ORDER — ONDANSETRON 2 MG/ML
4 INJECTION INTRAMUSCULAR; INTRAVENOUS EVERY 6 HOURS PRN
Status: DISCONTINUED | OUTPATIENT
Start: 2023-09-29 | End: 2023-10-01 | Stop reason: HOSPADM

## 2023-09-29 RX ORDER — ENOXAPARIN SODIUM 100 MG/ML
100 INJECTION SUBCUTANEOUS EVERY 12 HOURS
Status: COMPLETED | OUTPATIENT
Start: 2023-09-29 | End: 2023-09-30

## 2023-09-29 RX ORDER — ATENOLOL 25 MG/1
25 TABLET ORAL DAILY
Status: DISCONTINUED | OUTPATIENT
Start: 2023-09-29 | End: 2023-10-01 | Stop reason: HOSPADM

## 2023-09-29 RX ORDER — SODIUM CHLORIDE 9 MG/ML
40 INJECTION, SOLUTION INTRAVENOUS AS NEEDED
Status: DISCONTINUED | OUTPATIENT
Start: 2023-09-29 | End: 2023-10-01 | Stop reason: HOSPADM

## 2023-09-29 RX ORDER — BISACODYL 10 MG
10 SUPPOSITORY, RECTAL RECTAL DAILY PRN
Status: DISCONTINUED | OUTPATIENT
Start: 2023-09-29 | End: 2023-10-01 | Stop reason: HOSPADM

## 2023-09-29 RX ORDER — ONDANSETRON 4 MG/1
4 TABLET, FILM COATED ORAL EVERY 6 HOURS PRN
Status: DISCONTINUED | OUTPATIENT
Start: 2023-09-29 | End: 2023-10-01 | Stop reason: HOSPADM

## 2023-09-29 RX ORDER — ACETAMINOPHEN 325 MG/1
650 TABLET ORAL EVERY 4 HOURS PRN
Status: DISCONTINUED | OUTPATIENT
Start: 2023-09-29 | End: 2023-10-01 | Stop reason: HOSPADM

## 2023-09-29 RX ORDER — NITROGLYCERIN 0.4 MG/1
0.4 TABLET SUBLINGUAL
Status: DISCONTINUED | OUTPATIENT
Start: 2023-09-29 | End: 2023-10-01 | Stop reason: HOSPADM

## 2023-09-29 RX ORDER — BISACODYL 5 MG/1
5 TABLET, DELAYED RELEASE ORAL DAILY PRN
Status: DISCONTINUED | OUTPATIENT
Start: 2023-09-29 | End: 2023-10-01 | Stop reason: HOSPADM

## 2023-09-29 RX ORDER — AMOXICILLIN 250 MG
2 CAPSULE ORAL 2 TIMES DAILY
Status: DISCONTINUED | OUTPATIENT
Start: 2023-09-29 | End: 2023-10-01 | Stop reason: HOSPADM

## 2023-09-29 RX ORDER — ENOXAPARIN SODIUM 100 MG/ML
40 INJECTION SUBCUTANEOUS EVERY 24 HOURS
Status: DISCONTINUED | OUTPATIENT
Start: 2023-09-29 | End: 2023-09-29 | Stop reason: ALTCHOICE

## 2023-09-29 RX ORDER — HYDRALAZINE HYDROCHLORIDE 20 MG/ML
10 INJECTION INTRAMUSCULAR; INTRAVENOUS EVERY 6 HOURS PRN
Status: DISCONTINUED | OUTPATIENT
Start: 2023-09-29 | End: 2023-10-01 | Stop reason: HOSPADM

## 2023-09-29 RX ORDER — LORAZEPAM 1 MG/1
1 TABLET ORAL EVERY MORNING
Status: DISCONTINUED | OUTPATIENT
Start: 2023-09-30 | End: 2023-10-01 | Stop reason: HOSPADM

## 2023-09-29 RX ORDER — POLYETHYLENE GLYCOL 3350 17 G/17G
17 POWDER, FOR SOLUTION ORAL DAILY PRN
Status: DISCONTINUED | OUTPATIENT
Start: 2023-09-29 | End: 2023-10-01 | Stop reason: HOSPADM

## 2023-09-29 RX ORDER — SODIUM CHLORIDE 0.9 % (FLUSH) 0.9 %
10 SYRINGE (ML) INJECTION EVERY 12 HOURS SCHEDULED
Status: DISCONTINUED | OUTPATIENT
Start: 2023-09-29 | End: 2023-10-01 | Stop reason: HOSPADM

## 2023-09-29 RX ADMIN — ENOXAPARIN SODIUM 100 MG: 100 INJECTION SUBCUTANEOUS at 20:02

## 2023-09-29 RX ADMIN — NITROGLYCERIN 1 INCH: 20 OINTMENT TOPICAL at 15:21

## 2023-09-29 RX ADMIN — ATENOLOL 25 MG: 25 TABLET ORAL at 17:34

## 2023-09-29 RX ADMIN — Medication 10 ML: at 20:03

## 2023-09-29 NOTE — LETTER
October 1, 2023     Patient: Gamaliel Vogt   YOB: 1965   Date of Visit: 9/29/2023       To Whom It May Concern:    It is my medical opinion that Gamaliel Vogt may return to work with no restrictions on 10/4/23           Sincerely,  Cynthia Mccartney, NP

## 2023-09-29 NOTE — Clinical Note
Hemostasis started on the right radial artery. Radial compression device applied to vessel. Hemostasis achieved successfully. Closure device additional comment: TR band

## 2023-09-29 NOTE — ED PROVIDER NOTES
"Subjective   History of Present Illness  Chief complaint: Patient is a 58-year-old who presents emergency department today with chest pain.  He states he had it yesterday.  He feels it to the left sternal area radiating axillary and he feels it in his back and going into his neck.  The pain started suddenly.  It does wax and wane.  He has a history of hypertrophic cardiomyopathy.  He does also have a history of atrial fibrillation.  He follows with Dr. Millan.  He has never had a heart catheterization.  He does not think he has had a stress test before.  But he does have a strong family history of heart disease with multiple siblings that have had bypass surgery and further cardiac care.  He has no shortness of breath.  No new swelling.  He is on Xarelto for A-fib.  He has been compliant.  He did follow-up with Dr. Millan last month.  He did have a new echocardiogram done then.  Apparently his EKG was \"worse\".    Context:    Duration:    Timing: Acute sudden onset    Severity: Severe    Associated Symptoms:        PCP:  LMP:    Review of Systems   Constitutional:  Negative for fever.   Respiratory:  Negative for shortness of breath.    Cardiovascular:  Positive for chest pain.   Gastrointestinal: Negative.    Genitourinary: Negative.    Musculoskeletal: Negative.    Psychiatric/Behavioral: Negative.       Past Medical History:   Diagnosis Date    Abnormal ECG     Arrhythmia     Asthma     Atrial fibrillation     Congenital heart disease     Coronary artery disease     Heart murmur     Hyperlipidemia     Hypertension     Sleep apnea     CPap        No Known Allergies    Past Surgical History:   Procedure Laterality Date    RETINAL DETACHMENT SURGERY      VITRECTOMY PARS PLANA Left 08/11/2020    Procedure: VITRECTOMY PARS PLANA  25GA  WITH ENDOLASER AND  FLUID GAS EXCHANGE LEFT EYE;  Surgeon: Lazarus, Howard S., MD;  Location: [facility] MAIN OR;  Service: Ophthalmology;  Laterality: Left;       Family History   Problem " Relation Age of Onset    Asthma Mother     Heart disease Mother     No Known Problems Father     No Known Problems Sister     Arrhythmia Brother     Heart attack Brother     Heart disease Brother     No Known Problems Maternal Aunt     No Known Problems Maternal Uncle     No Known Problems Paternal Aunt     No Known Problems Paternal Uncle     No Known Problems Maternal Grandmother     No Known Problems Maternal Grandfather     No Known Problems Paternal Grandmother     No Known Problems Paternal Grandfather     No Known Problems Other     Anemia Neg Hx     Clotting disorder Neg Hx     Fainting Neg Hx     Heart failure Neg Hx     Hyperlipidemia Neg Hx     Hypertension Neg Hx        Social History     Socioeconomic History    Marital status:    Tobacco Use    Smoking status: Never     Passive exposure: Never    Smokeless tobacco: Never   Vaping Use    Vaping Use: Never used   Substance and Sexual Activity    Alcohol use: No    Drug use: Never    Sexual activity: Yes     Partners: Female           Objective   Physical Exam  Vitals and nursing note reviewed.   Constitutional:       Appearance: He is well-developed.   HENT:      Head: Normocephalic and atraumatic.   Cardiovascular:      Rate and Rhythm: Normal rate and regular rhythm.      Heart sounds: Normal heart sounds.   Pulmonary:      Effort: Pulmonary effort is normal.      Breath sounds: Normal breath sounds.   Abdominal:      Palpations: Abdomen is soft.      Tenderness: There is no abdominal tenderness.   Musculoskeletal:      Right lower leg: No edema.      Left lower leg: No edema.   Skin:     General: Skin is warm and dry.   Neurological:      General: No focal deficit present.      Mental Status: He is alert and oriented to person, place, and time.       Procedures           ED Course  ED Course as of 09/30/23 0944   Fri Sep 29, 2023   1631 Forward given to STVEEN Corral who accepts this patient in observation [KW]      ED Course User  Index  [KW] Veronika Dennis RITA, APRN      XR Chest 1 View    Result Date: 9/29/2023  1.No evidence for acute cardiopulmonary process. Electronically Signed: Kevin Fuller MD  9/29/2023 3:09 PM EDT  Workstation ID: [id number]        Results for orders placed or performed during the hospital encounter of 09/29/23   Comprehensive Metabolic Panel    Specimen: Arm, Left; Blood   Result Value Ref Range    Glucose 81 65 - 99 mg/dL    BUN 16 6 - 20 mg/dL    Creatinine 1.11 0.76 - 1.27 mg/dL    Sodium 142 136 - 145 mmol/L    Potassium 4.5 3.5 - 5.2 mmol/L    Chloride 102 98 - 107 mmol/L    CO2 30.0 (H) 22.0 - 29.0 mmol/L    Calcium 10.5 8.6 - 10.5 mg/dL    Total Protein 7.6 6.0 - 8.5 g/dL    Albumin 4.9 3.5 - 5.2 g/dL    ALT (SGPT) 26 1 - 41 U/L    AST (SGOT) 31 1 - 40 U/L    Alkaline Phosphatase 70 39 - 117 U/L    Total Bilirubin 1.3 (H) 0.0 - 1.2 mg/dL    Globulin 2.7 gm/dL    A/G Ratio 1.8 g/dL    BUN/Creatinine Ratio 14.4 7.0 - 25.0    Anion Gap 10.0 5.0 - 15.0 mmol/L    eGFR 76.5 >60.0 mL/min/1.73   Protime-INR    Specimen: Arm, Left; Blood   Result Value Ref Range    Protime 11.9 (H) 9.6 - 11.7 Seconds    INR 1.10 0.93 - 1.10   aPTT    Specimen: Arm, Left; Blood   Result Value Ref Range    PTT 32.1 (L) 61.0 - 76.5 seconds   High Sensitivity Troponin T    Specimen: Arm, Left; Blood   Result Value Ref Range    HS Troponin T 21 (H) <15 ng/L   CBC Auto Differential    Specimen: Arm, Left; Blood   Result Value Ref Range    WBC 7.50 3.40 - 10.80 10*3/mm3    RBC 5.11 4.14 - 5.80 10*6/mm3    Hemoglobin 15.9 13.0 - 17.7 g/dL    Hematocrit 47.8 37.5 - 51.0 %    MCV 93.5 79.0 - 97.0 fL    MCH 31.0 26.6 - 33.0 pg    MCHC 33.2 31.5 - 35.7 g/dL    RDW 13.3 12.3 - 15.4 %    RDW-SD 42.9 37.0 - 54.0 fl    MPV 10.1 6.0 - 12.0 fL    Platelets 216 140 - 450 10*3/mm3    Neutrophil % 56.8 42.7 - 76.0 %    Lymphocyte % 28.0 19.6 - 45.3 %    Monocyte % 10.3 5.0 - 12.0 %    Eosinophil % 4.6 0.3 - 6.2 %    Basophil % 0.3 0.0 - 1.5 %     Neutrophils, Absolute 4.30 1.70 - 7.00 10*3/mm3    Lymphocytes, Absolute 2.10 0.70 - 3.10 10*3/mm3    Monocytes, Absolute 0.80 0.10 - 0.90 10*3/mm3    Eosinophils, Absolute 0.30 0.00 - 0.40 10*3/mm3    Basophils, Absolute 0.00 0.00 - 0.20 10*3/mm3    nRBC 0.1 0.0 - 0.2 /100 WBC   High Sensitivity Troponin T 2Hr    Specimen: Arm, Left; Blood   Result Value Ref Range    HS Troponin T 20 (H) <15 ng/L    Troponin T Delta -1 >=-4 - <+4 ng/L   Uric Acid    Specimen: Arm, Left; Blood   Result Value Ref Range    Uric Acid 7.3 (H) 3.4 - 7.0 mg/dL   Hemoglobin A1c    Specimen: Arm, Left; Blood   Result Value Ref Range    Hemoglobin A1C 5.60 4.80 - 5.60 %   Lipid Panel    Specimen: Arm, Left; Blood   Result Value Ref Range    Total Cholesterol 177 0 - 200 mg/dL    Triglycerides 92 0 - 150 mg/dL    HDL Cholesterol 48 40 - 60 mg/dL    LDL Cholesterol  112 (H) 0 - 100 mg/dL    VLDL Cholesterol 17 5 - 40 mg/dL    LDL/HDL Ratio 2.30    BNP    Specimen: Arm, Left; Blood   Result Value Ref Range    proBNP 962.6 (H) 0.0 - 900.0 pg/mL   TSH    Specimen: Arm, Left; Blood   Result Value Ref Range    TSH 2.310 0.270 - 4.200 uIU/mL   T4, Free    Specimen: Arm, Left; Blood   Result Value Ref Range    Free T4 0.80 (L) 0.93 - 1.70 ng/dL   Basic Metabolic Panel    Specimen: Arm, Right; Blood   Result Value Ref Range    Glucose 100 (H) 65 - 99 mg/dL    BUN 18 6 - 20 mg/dL    Creatinine 1.10 0.76 - 1.27 mg/dL    Sodium 135 (L) 136 - 145 mmol/L    Potassium 3.6 3.5 - 5.2 mmol/L    Chloride 100 98 - 107 mmol/L    CO2 28.0 22.0 - 29.0 mmol/L    Calcium 9.2 8.6 - 10.5 mg/dL    BUN/Creatinine Ratio 16.4 7.0 - 25.0    Anion Gap 7.0 5.0 - 15.0 mmol/L    eGFR 77.3 >60.0 mL/min/1.73   CBC Auto Differential    Specimen: Arm, Right; Blood   Result Value Ref Range    WBC 8.50 3.40 - 10.80 10*3/mm3    RBC 4.76 4.14 - 5.80 10*6/mm3    Hemoglobin 14.6 13.0 - 17.7 g/dL    Hematocrit 44.4 37.5 - 51.0 %    MCV 93.3 79.0 - 97.0 fL    MCH 30.7 26.6 - 33.0 pg     MCHC 32.9 31.5 - 35.7 g/dL    RDW 13.5 12.3 - 15.4 %    RDW-SD 43.3 37.0 - 54.0 fl    MPV 10.1 6.0 - 12.0 fL    Platelets 197 140 - 450 10*3/mm3    Neutrophil % 61.0 42.7 - 76.0 %    Lymphocyte % 22.2 19.6 - 45.3 %    Monocyte % 10.2 5.0 - 12.0 %    Eosinophil % 5.6 0.3 - 6.2 %    Basophil % 1.0 0.0 - 1.5 %    Neutrophils, Absolute 5.20 1.70 - 7.00 10*3/mm3    Lymphocytes, Absolute 1.90 0.70 - 3.10 10*3/mm3    Monocytes, Absolute 0.90 0.10 - 0.90 10*3/mm3    Eosinophils, Absolute 0.50 (H) 0.00 - 0.40 10*3/mm3    Basophils, Absolute 0.10 0.00 - 0.20 10*3/mm3    nRBC 0.0 0.0 - 0.2 /100 WBC   Stress Test With Myocardial Perfusion One Day   Result Value Ref Range     CV STRESS PROTOCOL 1 Pharmacologic     Stage 1 1.0     HR Stage 1 65     BP Stage 1 116/78     Duration Min Stage 1 0     Duration Sec Stage 1 10     Stress Dose Regadenoson Stage 1 0.40     Stress Comments Stage 1 10 sec bolus injection     Stage 2 2.0     HR Stage 2 87     BP Stage 2 116/78     Duration Min Stage 2 4     Duration Sec Stage 2 0     Stress Comments Stage 2 recovery     Stage 3 3.0     HR Stage 3 71     BP Stage 3 116/78     Stage 4 4.0     HR Stage 4 64     BP Stage 4 116/78     Baseline HR 58 bpm    Baseline /78 mmHg    Peak HR 87 bpm    Peak /78 mmHg    Recovery HR 75 bpm    Recovery /72 mmHg    Target HR (85%) 137 bpm    Max. Pred. HR (100%) 161 bpm    Percent Max Pred HR 54.04 %    Percent Target HR 64 %     CV REST NUCLEAR ISOTOPE DOSE 10.7 mCi     CV STRESS NUCLEAR ISOTOPE DOSE 33.0 mCi   ECG 12 Lead Chest Pain   Result Value Ref Range    QT Interval 407 ms    QTC Interval 427 ms                                       Medical Decision Making  Patient was seen and evaluated for the above problem    Differential diagnosis includes but is not limited to ACS, PE, pneumothorax, pneumonia  Patient has no widening of the mediastinum.  No pneumothorax.  This was on chest x-ray reviewed and interpreted by myself.  His  EKG interpreted by myself shows atrial fibrillation that is rate controlled.  No ST elevation noted.  He chronically is anticoagulated for this.  He has not had stress or heart cath in the past.  Now he is having significant recurrent chest pain with strong family history we will place in the ED observation unit for further cardiac consultation from Dr. Millan.    Problems Addressed:  Chest pain, unspecified type: complicated acute illness or injury    Amount and/or Complexity of Data Reviewed  Labs: ordered. Decision-making details documented in ED Course.  Radiology: ordered and independent interpretation performed.     Details: Chest x-ray reviewed by myself as above  ECG/medicine tests: ordered and independent interpretation performed.     Details: EKG reviewed by myself as above    Risk  Prescription drug management.  Decision regarding hospitalization.        Final diagnoses:   None     Chest pain  ED Disposition  ED Disposition       None            No follow-up provider specified.       Medication List      No changes were made to your prescriptions during this visit.            Levi Blank, DO  09/29/23 1616       Levi Blank,   09/30/23 0934

## 2023-09-30 ENCOUNTER — APPOINTMENT (OUTPATIENT)
Dept: NUCLEAR MEDICINE | Facility: HOSPITAL | Age: 58
End: 2023-09-30
Payer: COMMERCIAL

## 2023-09-30 PROBLEM — R94.39 ABNORMAL NUCLEAR STRESS TEST: Status: ACTIVE | Noted: 2023-09-29

## 2023-09-30 LAB
ANION GAP SERPL CALCULATED.3IONS-SCNC: 7 MMOL/L (ref 5–15)
BASOPHILS # BLD AUTO: 0.1 10*3/MM3 (ref 0–0.2)
BASOPHILS NFR BLD AUTO: 1 % (ref 0–1.5)
BH CV REST NUCLEAR ISOTOPE DOSE: 10.7 MCI
BH CV STRESS BP STAGE 1: NORMAL
BH CV STRESS BP STAGE 2: NORMAL
BH CV STRESS BP STAGE 3: NORMAL
BH CV STRESS BP STAGE 4: NORMAL
BH CV STRESS COMMENTS STAGE 1: NORMAL
BH CV STRESS COMMENTS STAGE 2: NORMAL
BH CV STRESS DOSE REGADENOSON STAGE 1: 0.4
BH CV STRESS DURATION MIN STAGE 1: 0
BH CV STRESS DURATION MIN STAGE 2: 4
BH CV STRESS DURATION SEC STAGE 1: 10
BH CV STRESS DURATION SEC STAGE 2: 0
BH CV STRESS HR STAGE 1: 65
BH CV STRESS HR STAGE 2: 87
BH CV STRESS HR STAGE 3: 71
BH CV STRESS HR STAGE 4: 64
BH CV STRESS NUCLEAR ISOTOPE DOSE: 33 MCI
BH CV STRESS PROTOCOL 1: NORMAL
BH CV STRESS RECOVERY BP: NORMAL MMHG
BH CV STRESS RECOVERY HR: 75 BPM
BH CV STRESS STAGE 1: 1
BH CV STRESS STAGE 2: 2
BH CV STRESS STAGE 3: 3
BH CV STRESS STAGE 4: 4
BUN SERPL-MCNC: 18 MG/DL (ref 6–20)
BUN/CREAT SERPL: 16.4 (ref 7–25)
CALCIUM SPEC-SCNC: 9.2 MG/DL (ref 8.6–10.5)
CHLORIDE SERPL-SCNC: 100 MMOL/L (ref 98–107)
CO2 SERPL-SCNC: 28 MMOL/L (ref 22–29)
CREAT SERPL-MCNC: 1.1 MG/DL (ref 0.76–1.27)
DEPRECATED RDW RBC AUTO: 43.3 FL (ref 37–54)
EGFRCR SERPLBLD CKD-EPI 2021: 77.3 ML/MIN/1.73
EOSINOPHIL # BLD AUTO: 0.5 10*3/MM3 (ref 0–0.4)
EOSINOPHIL NFR BLD AUTO: 5.6 % (ref 0.3–6.2)
ERYTHROCYTE [DISTWIDTH] IN BLOOD BY AUTOMATED COUNT: 13.5 % (ref 12.3–15.4)
GLUCOSE SERPL-MCNC: 100 MG/DL (ref 65–99)
HCT VFR BLD AUTO: 44.4 % (ref 37.5–51)
HGB BLD-MCNC: 14.6 G/DL (ref 13–17.7)
LV EF NUC BP: 59 %
LYMPHOCYTES # BLD AUTO: 1.9 10*3/MM3 (ref 0.7–3.1)
LYMPHOCYTES NFR BLD AUTO: 22.2 % (ref 19.6–45.3)
MAXIMAL PREDICTED HEART RATE: 161 BPM
MCH RBC QN AUTO: 30.7 PG (ref 26.6–33)
MCHC RBC AUTO-ENTMCNC: 32.9 G/DL (ref 31.5–35.7)
MCV RBC AUTO: 93.3 FL (ref 79–97)
MONOCYTES # BLD AUTO: 0.9 10*3/MM3 (ref 0.1–0.9)
MONOCYTES NFR BLD AUTO: 10.2 % (ref 5–12)
NEUTROPHILS NFR BLD AUTO: 5.2 10*3/MM3 (ref 1.7–7)
NEUTROPHILS NFR BLD AUTO: 61 % (ref 42.7–76)
NRBC BLD AUTO-RTO: 0 /100 WBC (ref 0–0.2)
PERCENT MAX PREDICTED HR: 54.04 %
PLATELET # BLD AUTO: 197 10*3/MM3 (ref 140–450)
PMV BLD AUTO: 10.1 FL (ref 6–12)
POTASSIUM SERPL-SCNC: 3.6 MMOL/L (ref 3.5–5.2)
QT INTERVAL: 407 MS
QTC INTERVAL: 427 MS
RBC # BLD AUTO: 4.76 10*6/MM3 (ref 4.14–5.8)
SODIUM SERPL-SCNC: 135 MMOL/L (ref 136–145)
STRESS BASELINE BP: NORMAL MMHG
STRESS BASELINE HR: 58 BPM
STRESS PERCENT HR: 64 %
STRESS POST PEAK BP: NORMAL MMHG
STRESS POST PEAK HR: 87 BPM
STRESS TARGET HR: 137 BPM
WBC NRBC COR # BLD: 8.5 10*3/MM3 (ref 3.4–10.8)

## 2023-09-30 PROCEDURE — 96374 THER/PROPH/DIAG INJ IV PUSH: CPT

## 2023-09-30 PROCEDURE — A9502 TC99M TETROFOSMIN: HCPCS | Performed by: EMERGENCY MEDICINE

## 2023-09-30 PROCEDURE — 25010000002 FUROSEMIDE PER 20 MG: Performed by: NURSE PRACTITIONER

## 2023-09-30 PROCEDURE — 25010000002 ENOXAPARIN PER 10 MG: Performed by: INTERNAL MEDICINE

## 2023-09-30 PROCEDURE — 0 TECHNETIUM TETROFOSMIN KIT: Performed by: EMERGENCY MEDICINE

## 2023-09-30 PROCEDURE — 78452 HT MUSCLE IMAGE SPECT MULT: CPT | Performed by: INTERNAL MEDICINE

## 2023-09-30 PROCEDURE — G0378 HOSPITAL OBSERVATION PER HR: HCPCS

## 2023-09-30 PROCEDURE — 93016 CV STRESS TEST SUPVJ ONLY: CPT | Performed by: NURSE PRACTITIONER

## 2023-09-30 PROCEDURE — 96372 THER/PROPH/DIAG INJ SC/IM: CPT

## 2023-09-30 PROCEDURE — 99214 OFFICE O/P EST MOD 30 MIN: CPT | Performed by: INTERNAL MEDICINE

## 2023-09-30 PROCEDURE — 93018 CV STRESS TEST I&R ONLY: CPT | Performed by: INTERNAL MEDICINE

## 2023-09-30 PROCEDURE — 78452 HT MUSCLE IMAGE SPECT MULT: CPT

## 2023-09-30 PROCEDURE — 80048 BASIC METABOLIC PNL TOTAL CA: CPT | Performed by: NURSE PRACTITIONER

## 2023-09-30 PROCEDURE — 25010000002 REGADENOSON 0.4 MG/5ML SOLUTION: Performed by: EMERGENCY MEDICINE

## 2023-09-30 PROCEDURE — 85025 COMPLETE CBC W/AUTO DIFF WBC: CPT | Performed by: NURSE PRACTITIONER

## 2023-09-30 PROCEDURE — 25010000002 ENOXAPARIN PER 10 MG: Performed by: NURSE PRACTITIONER

## 2023-09-30 PROCEDURE — 93017 CV STRESS TEST TRACING ONLY: CPT

## 2023-09-30 RX ORDER — FUROSEMIDE 10 MG/ML
40 INJECTION INTRAMUSCULAR; INTRAVENOUS DAILY
Status: DISCONTINUED | OUTPATIENT
Start: 2023-09-30 | End: 2023-09-30

## 2023-09-30 RX ORDER — SODIUM CHLORIDE 9 MG/ML
75 INJECTION, SOLUTION INTRAVENOUS CONTINUOUS
Status: DISCONTINUED | OUTPATIENT
Start: 2023-10-01 | End: 2023-10-01 | Stop reason: HOSPADM

## 2023-09-30 RX ORDER — LORAZEPAM 1 MG/1
1 TABLET ORAL ONCE
Status: COMPLETED | OUTPATIENT
Start: 2023-09-30 | End: 2023-09-30

## 2023-09-30 RX ORDER — REGADENOSON 0.08 MG/ML
0.4 INJECTION, SOLUTION INTRAVENOUS
Status: COMPLETED | OUTPATIENT
Start: 2023-09-30 | End: 2023-09-30

## 2023-09-30 RX ADMIN — TETROFOSMIN 1 DOSE: 1.38 INJECTION, POWDER, LYOPHILIZED, FOR SOLUTION INTRAVENOUS at 07:25

## 2023-09-30 RX ADMIN — REGADENOSON 0.4 MG: 0.08 INJECTION, SOLUTION INTRAVENOUS at 08:47

## 2023-09-30 RX ADMIN — ENOXAPARIN SODIUM 100 MG: 100 INJECTION SUBCUTANEOUS at 12:24

## 2023-09-30 RX ADMIN — ATENOLOL 25 MG: 25 TABLET ORAL at 12:24

## 2023-09-30 RX ADMIN — LORAZEPAM 1 MG: 1 TABLET ORAL at 21:29

## 2023-09-30 RX ADMIN — FUROSEMIDE 40 MG: 10 INJECTION, SOLUTION INTRAMUSCULAR; INTRAVENOUS at 12:24

## 2023-09-30 RX ADMIN — SODIUM CHLORIDE 75 ML/HR: 9 INJECTION, SOLUTION INTRAVENOUS at 23:25

## 2023-09-30 RX ADMIN — Medication 10 ML: at 21:03

## 2023-09-30 RX ADMIN — ENOXAPARIN SODIUM 100 MG: 100 INJECTION SUBCUTANEOUS at 21:03

## 2023-09-30 RX ADMIN — TETROFOSMIN 1 DOSE: 1.38 INJECTION, POWDER, LYOPHILIZED, FOR SOLUTION INTRAVENOUS at 08:47

## 2023-09-30 NOTE — PLAN OF CARE
Goal Outcome Evaluation:              Outcome Evaluation: Pt admitted for chest pain from the ED. Pt reporting no chest pain this shift. Patient scheduled for stess/myoview this AM. VSS. Care continues.

## 2023-09-30 NOTE — CONSULTS
Referring Provider: Levi Blank DO    Reason for Consultation: abnormal stress test      Patient Care Team:  Rhea Carlson MD as PCP - General (Family Medicine)  Yordy Millan MD as Consulting Physician (Cardiology)      SUBJECTIVE     Chief Complaint:  chest pain    History of present illness:  Gamaliel Vogt is a 58 y.o. male known to Dr. Millan with a past cardiac history of hypertrophic cardiomyopathy, mitral valve regurgitation, and atrial fibrillation with failed cardioversion in the past and no further rhythm strategies pursued (anticoagulated with Xarelto).  Last 2D echo 2023 showed an EF of 56 to 60% with mild to moderate MR and mild TR.  PMH includes HTN, HLD, asthma, and HANY (uses CPAP).  He has a family history premature CAD in a first-degree relative.  His brother  in his 50s of an MI.  He presented to [facility] with complaint of chest pain.  He underwent nuclear stress testing which showed evidence of a moderate-sized infarct of the basal inferior lateral wall with mild dickson-infarct ischemia.  Cardiology was consulted for further evaluation.    Patient reports that a couple days ago he awoke with a left-sided chest pain radiating through to his left back and left neck.  It did not last very long and therefore he ignored it initially.  He went to work yesterday as an [employer]  and was lifting some heavier items that he was accustomed to and had recurrence of his chest pain.  This was alleviated by Nitropaste in the ER.  Otherwise he denies dizziness shortness of breath or edema.  He tells me he was diagnosed with HOCM in the  prior to relocating to this area.  More recent echoes do not show evidence of this.    At the time of my assessment he denies chest pain but is still wearing nitropaste.  Denies shortness of breath or dizziness.     I have reviewed his EKG and stress test.       Review of systems:    Constitutional: No weakness, fatigue, fever, rigors,  chills   Eyes: No vision changes, eye pain   ENT/oropharynx: No difficulty swallowing, sore throat, epistaxis, changes in hearing   Cardiovascular: +chest pain, chest tightness, palpitations, paroxysmal nocturnal dyspnea, orthopnea, diaphoresis, dizziness / syncopal episode   Respiratory: No shortness of breath, dyspnea on exertion, cough, wheezing, hemoptysis   Gastrointestinal: No abdominal pain, nausea, vomiting, diarrhea, bloody stools   Genitourinary: No hematuria, dysuria   Neurological: No headache, tremors, numbness, one-sided weakness    Musculoskeletal: No cramps, myalgias, joint pain, joint swelling   Integument: No rash, edema        Personal History:      Past Medical History:   Diagnosis Date    Abnormal ECG     Arrhythmia     Asthma     Atrial fibrillation     Congenital heart disease     Coronary artery disease     Heart murmur     Hyperlipidemia     Hypertension     Sleep apnea     CPap        Past Surgical History:   Procedure Laterality Date    RETINAL DETACHMENT SURGERY      VITRECTOMY PARS PLANA Left 08/11/2020    Procedure: VITRECTOMY PARS PLANA  25GA  WITH ENDOLASER AND  FLUID GAS EXCHANGE LEFT EYE;  Surgeon: Lazarus, Howard S., MD;  Location: [facility];  Service: Ophthalmology;  Laterality: Left;       Family History   Problem Relation Age of Onset    Asthma Mother     Heart disease Mother     No Known Problems Father     No Known Problems Sister     Arrhythmia Brother     Heart attack Brother     Heart disease Brother     No Known Problems Maternal Aunt     No Known Problems Maternal Uncle     No Known Problems Paternal Aunt     No Known Problems Paternal Uncle     No Known Problems Maternal Grandmother     No Known Problems Maternal Grandfather     No Known Problems Paternal Grandmother     No Known Problems Paternal Grandfather     No Known Problems Other     Anemia Neg Hx     Clotting disorder Neg Hx     Fainting Neg Hx     Heart failure Neg Hx     Hyperlipidemia Neg Hx      "Hypertension Neg Hx      Mother with CHF.  One brother who  of MI in his 50s.  Another brother with unknown heart problems.    Social History     Tobacco Use    Smoking status: Never     Passive exposure: Never    Smokeless tobacco: Never   Vaping Use    Vaping Use: Never used   Substance Use Topics    Alcohol use: No    Drug use: Never        Home meds:  Prior to Admission medications    Medication Sig Start Date End Date Taking? Authorizing Provider   atenolol (TENORMIN) 25 MG tablet Take 1 tablet by mouth Daily. 18  Yes ProviderMaliha MD   LORazepam (ATIVAN) 1 MG tablet Take 1 tablet by mouth Every Morning. And if needed at night, but does not take 2nd dose often 10/24/16  Yes ProviderMailha MD   rivaroxaban (XARELTO) 20 MG tablet Take 1 tablet by mouth Daily.   Yes ProviderMaliha MD       Allergies:     Patient has no known allergies.    Scheduled Meds:atenolol, 25 mg, Oral, Daily  enoxaparin, 100 mg, Subcutaneous, Q12H  furosemide, 40 mg, Intravenous, Daily  LORazepam, 1 mg, Oral, QAM  senna-docusate sodium, 2 tablet, Oral, BID  sodium chloride, 10 mL, Intravenous, Q12H      Continuous Infusions:   PRN Meds:  acetaminophen    senna-docusate sodium **AND** polyethylene glycol **AND** bisacodyl **AND** bisacodyl    hydrALAZINE    nitroglycerin    ondansetron **OR** ondansetron    [COMPLETED] Insert Peripheral IV **AND** sodium chloride    sodium chloride    sodium chloride      OBJECTIVE    Vital Signs  Vitals:    23 0204 23 0508 23 0535 23 1030   BP: 100/60  102/58 122/81   BP Location: Right arm  Right arm Left arm   Patient Position: Lying  Lying Lying   Pulse: (!) 47  61 67   Resp: 16  16 19   Temp: 97.5 °F (36.4 °C)  97.4 °F (36.3 °C) 95.4 °F (35.2 °C)   TempSrc: Oral  Oral Oral   SpO2: 97%  95% 97%   Weight:  106 kg (232 lb 12.9 oz)     Height:           Flowsheet Rows      Flowsheet Row First Filed Value   Admission Height 177.8 cm (70\") Documented at " 09/29/2023 1250   Admission Weight 104 kg (230 lb) Documented at 09/29/2023 1250              Intake/Output Summary (Last 24 hours) at 9/30/2023 1244  Last data filed at 9/29/2023 2300  Gross per 24 hour   Intake 600 ml   Output --   Net 600 ml        Telemetry:  afib with CVR    Physical Exam:  The patient is alert, oriented and in no distress.  Vital signs as noted above.  Head and neck revealed no carotid bruits or jugular venous distention.  No thyromegaly or lymphadenopathy is present  Lungs clear.  No wheezing.  Breath sounds are normal bilaterally.  Heart: Normal first and second heart sounds. No murmur.  No precordial rub is present.  No gallop is present.  Abdomen: Soft and nontender.  No organomegaly is present.  Extremities with good peripheral pulses without any pedal edema.  Skin: Warm and dry.  Musculoskeletal system is grossly normal.  CNS grossly normal.       Results Review:  I have personally reviewed the results from the time of this admission to 9/30/2023 12:44 EDT and agree with these findings:  []  Laboratory  []  Microbiology  []  Radiology  []  EKG/Telemetry   []  Cardiology/Vascular   []  Pathology  []  Old records  []  Other:    Most notable findings include:     Lab Results (last 24 hours)       Procedure Component Value Units Date/Time    Basic Metabolic Panel [[id number]]  (Abnormal) Collected: 09/30/23 0340    Specimen: Blood from Arm, Right Updated: 09/30/23 0436     Glucose 100 mg/dL      BUN 18 mg/dL      Creatinine 1.10 mg/dL      Sodium 135 mmol/L      Potassium 3.6 mmol/L      Chloride 100 mmol/L      CO2 28.0 mmol/L      Calcium 9.2 mg/dL      BUN/Creatinine Ratio 16.4     Anion Gap 7.0 mmol/L      eGFR 77.3 mL/min/1.73     Narrative:      GFR Normal >60  Chronic Kidney Disease <60  Kidney Failure <15      CBC & Differential [[id number]]  (Abnormal) Collected: 09/30/23 0340    Specimen: Blood from Arm, Right Updated: 09/30/23 0421    Narrative:      The following orders were  created for panel order CBC & Differential.  Procedure                               Abnormality         Status                     ---------                               -----------         ------                     CBC Auto Differential[[id number]]        Abnormal            Final result                 Please view results for these tests on the individual orders.    CBC Auto Differential [[id number]]  (Abnormal) Collected: 09/30/23 0340    Specimen: Blood from Arm, Right Updated: 09/30/23 0421     WBC 8.50 10*3/mm3      RBC 4.76 10*6/mm3      Hemoglobin 14.6 g/dL      Hematocrit 44.4 %      MCV 93.3 fL      MCH 30.7 pg      MCHC 32.9 g/dL      RDW 13.5 %      RDW-SD 43.3 fl      MPV 10.1 fL      Platelets 197 10*3/mm3      Neutrophil % 61.0 %      Lymphocyte % 22.2 %      Monocyte % 10.2 %      Eosinophil % 5.6 %      Basophil % 1.0 %      Neutrophils, Absolute 5.20 10*3/mm3      Lymphocytes, Absolute 1.90 10*3/mm3      Monocytes, Absolute 0.90 10*3/mm3      Eosinophils, Absolute 0.50 10*3/mm3      Basophils, Absolute 0.10 10*3/mm3      nRBC 0.0 /100 WBC     High Sensitivity Troponin T 2Hr [[id number]]  (Abnormal) Collected: 09/29/23 1739    Specimen: Blood from Arm, Left Updated: 09/29/23 1812     HS Troponin T 20 ng/L      Troponin T Delta -1 ng/L     Narrative:      High Sensitive Troponin T Reference Range:  <10.0 ng/L- Negative Female for AMI  <15.0 ng/L- Negative Male for AMI  >=10 - Abnormal Female indicating possible myocardial injury.  >=15 - Abnormal Male indicating possible myocardial injury.   Clinicians would have to utilize clinical acumen, EKG, Troponin, and serial changes to determine if it is an Acute Myocardial Infarction or myocardial injury due to an underlying chronic condition.         BNP [[id number]]  (Abnormal) Collected: 09/29/23 1537    Specimen: Blood from Arm, Left Updated: 09/29/23 1707     proBNP 962.6 pg/mL     Narrative:      This assay is used as an aid in the diagnosis of  individuals suspected of having heart failure. It can be used as an aid in the diagnosis of acute decompensated heart failure (ADHF) in patients presenting with signs and symptoms of ADHF to the emergency department (ED). In addition, NT-proBNP of <300 pg/mL indicates ADHF is not likely.    TSH [[id number]]  (Normal) Collected: 09/29/23 1537    Specimen: Blood from Arm, Left Updated: 09/29/23 1707     TSH 2.310 uIU/mL     T4, Free [[id number]]  (Abnormal) Collected: 09/29/23 1537    Specimen: Blood from Arm, Left Updated: 09/29/23 1707     Free T4 0.80 ng/dL     Narrative:      Results may be falsely increased if patient taking Biotin.      Uric Acid [[id number]]  (Abnormal) Collected: 09/29/23 1537    Specimen: Blood from Arm, Left Updated: 09/29/23 1701     Uric Acid 7.3 mg/dL     Lipid Panel [[id number]]  (Abnormal) Collected: 09/29/23 1537    Specimen: Blood from Arm, Left Updated: 09/29/23 1701     Total Cholesterol 177 mg/dL      Triglycerides 92 mg/dL      HDL Cholesterol 48 mg/dL      LDL Cholesterol  112 mg/dL      VLDL Cholesterol 17 mg/dL      LDL/HDL Ratio 2.30    Narrative:      Cholesterol Reference Ranges  (U.S. Department of Health and Human Services ATP III Classifications)    Desirable          <200 mg/dL  Borderline High    200-239 mg/dL  High Risk          >240 mg/dL      Triglyceride Reference Ranges  (U.S. Department of Health and Human Services ATP III Classifications)    Normal           <150 mg/dL  Borderline High  150-199 mg/dL  High             200-499 mg/dL  Very High        >500 mg/dL    HDL Reference Ranges  (U.S. Department of Health and Human Services ATP III Classifications)    Low     <40 mg/dl (major risk factor for CHD)  High    >60 mg/dl ('negative' risk factor for CHD)        LDL Reference Ranges  (U.S. Department of Health and Human Services ATP III Classifications)    Optimal          <100 mg/dL  Near Optimal     100-129 mg/dL  Borderline High  130-159 mg/dL  High              160-189 mg/dL  Very High        >189 mg/dL    Hemoglobin A1c [[id number]]  (Normal) Collected: 09/29/23 1537    Specimen: Blood from Arm, Left Updated: 09/29/23 1654     Hemoglobin A1C 5.60 %     Comprehensive Metabolic Panel [[id number]]  (Abnormal) Collected: 09/29/23 1537    Specimen: Blood from Arm, Left Updated: 09/29/23 1630     Glucose 81 mg/dL      BUN 16 mg/dL      Creatinine 1.11 mg/dL      Sodium 142 mmol/L      Potassium 4.5 mmol/L      Comment: Slight hemolysis detected by analyzer. Results may be affected.        Chloride 102 mmol/L      CO2 30.0 mmol/L      Calcium 10.5 mg/dL      Total Protein 7.6 g/dL      Albumin 4.9 g/dL      ALT (SGPT) 26 U/L      AST (SGOT) 31 U/L      Comment: Slight hemolysis detected by analyzer. Results may be affected.        Alkaline Phosphatase 70 U/L      Total Bilirubin 1.3 mg/dL      Globulin 2.7 gm/dL      A/G Ratio 1.8 g/dL      BUN/Creatinine Ratio 14.4     Anion Gap 10.0 mmol/L      eGFR 76.5 mL/min/1.73     Narrative:      GFR Normal >60  Chronic Kidney Disease <60  Kidney Failure <15      High Sensitivity Troponin T [[id number]]  (Abnormal) Collected: 09/29/23 1537    Specimen: Blood from Arm, Left Updated: 09/29/23 1620     HS Troponin T 21 ng/L     Narrative:      High Sensitive Troponin T Reference Range:  <10.0 ng/L- Negative Female for AMI  <15.0 ng/L- Negative Male for AMI  >=10 - Abnormal Female indicating possible myocardial injury.  >=15 - Abnormal Male indicating possible myocardial injury.   Clinicians would have to utilize clinical acumen, EKG, Troponin, and serial changes to determine if it is an Acute Myocardial Infarction or myocardial injury due to an underlying chronic condition.         Protime-INR [[id number]]  (Abnormal) Collected: 09/29/23 1537    Specimen: Blood from Arm, Left Updated: 09/29/23 1600     Protime 11.9 Seconds      INR 1.10    aPTT [[id number]]  (Abnormal) Collected: 09/29/23 1537    Specimen: Blood from Arm, Left Updated: 09/29/23  1600     PTT 32.1 seconds     CBC & Differential [[id number]]  (Normal) Collected: 09/29/23 1537    Specimen: Blood from Arm, Left Updated: 09/29/23 1554    Narrative:      The following orders were created for panel order CBC & Differential.  Procedure                               Abnormality         Status                     ---------                               -----------         ------                     CBC Auto Differential[[id number]]        Normal              Final result                 Please view results for these tests on the individual orders.    CBC Auto Differential [[id number]]  (Normal) Collected: 09/29/23 1537    Specimen: Blood from Arm, Left Updated: 09/29/23 1554     WBC 7.50 10*3/mm3      RBC 5.11 10*6/mm3      Hemoglobin 15.9 g/dL      Hematocrit 47.8 %      MCV 93.5 fL      MCH 31.0 pg      MCHC 33.2 g/dL      RDW 13.3 %      RDW-SD 42.9 fl      MPV 10.1 fL      Platelets 216 10*3/mm3      Neutrophil % 56.8 %      Lymphocyte % 28.0 %      Monocyte % 10.3 %      Eosinophil % 4.6 %      Basophil % 0.3 %      Neutrophils, Absolute 4.30 10*3/mm3      Lymphocytes, Absolute 2.10 10*3/mm3      Monocytes, Absolute 0.80 10*3/mm3      Eosinophils, Absolute 0.30 10*3/mm3      Basophils, Absolute 0.00 10*3/mm3      nRBC 0.1 /100 WBC             Imaging Results (Last 24 Hours)       Procedure Component Value Units Date/Time    XR Chest 1 View [[id number]] Collected: 09/29/23 1509     Updated: 09/29/23 1511    Narrative:      XR CHEST 1 VW    Date of Exam: 9/29/2023 3:07 PM EDT    Indication: chest pain    Comparison: None available.    FINDINGS:  No consolidations or pleural effusions are observed. The cardiac silhouette is within normal limits for size. The mediastinum is unremarkable. No acute osseous abnormalities are identified on this single view.      Impression:      1.No evidence for acute cardiopulmonary process.    Electronically Signed: Kevin Fuller MD    9/29/2023 3:09 PM EDT     Workstation ID: [id number]            LAB RESULTS (LAST 7 DAYS)    CBC  Results from last 7 days   Lab Units 09/30/23  0340 09/29/23  1537   WBC 10*3/mm3 8.50 7.50   RBC 10*6/mm3 4.76 5.11   HEMOGLOBIN g/dL 14.6 15.9   HEMATOCRIT % 44.4 47.8   MCV fL 93.3 93.5   PLATELETS 10*3/mm3 197 216       BMP  Results from last 7 days   Lab Units 09/30/23  0340 09/29/23  1537   SODIUM mmol/L 135* 142   POTASSIUM mmol/L 3.6 4.5   CHLORIDE mmol/L 100 102   CO2 mmol/L 28.0 30.0*   BUN mg/dL 18 16   CREATININE mg/dL 1.10 1.11   GLUCOSE mg/dL 100* 81       CMP   Results from last 7 days   Lab Units 09/30/23  0340 09/29/23  1537   SODIUM mmol/L 135* 142   POTASSIUM mmol/L 3.6 4.5   CHLORIDE mmol/L 100 102   CO2 mmol/L 28.0 30.0*   BUN mg/dL 18 16   CREATININE mg/dL 1.10 1.11   GLUCOSE mg/dL 100* 81   ALBUMIN g/dL  --  4.9   BILIRUBIN mg/dL  --  1.3*   ALK PHOS U/L  --  70   AST (SGOT) U/L  --  31   ALT (SGPT) U/L  --  26       BNP        TROPONIN  Results from last 7 days   Lab Units 09/29/23  1739   HSTROP T ng/L 20*       CoAg  Results from last 7 days   Lab Units 09/29/23  1537   INR  1.10   APTT seconds 32.1*       Creatinine Clearance  Estimated Creatinine Clearance: 88.2 mL/min (by C-G formula based on SCr of 1.1 mg/dL).    ABG          Radiology  XR Chest 1 View    Result Date: 9/29/2023  1.No evidence for acute cardiopulmonary process. Electronically Signed: Kevin Fuller MD  9/29/2023 3:09 PM EDT  Workstation ID: [id number]       EKG  I personally viewed and interpreted the patient's EKG/Telemetry data:  ECG 12 Lead Chest Pain   Final Result   HEART RATE= 66  bpm   RR Interval= 910  ms   OH Interval=   ms   P Horizontal Axis=   deg   P Front Axis=   deg   QRSD Interval= 94  ms   QT Interval= 407  ms   QTcB= 427  ms   QRS Axis= -19  deg   T Wave Axis= 110  deg   - ABNORMAL ECG -   Atrial fibrillation   When compared with ECG of 11-Aug-2020 12:28:06,   Significant change in rhythm   Significant repolarization change    Electronically Signed By: Levi Blank (SEEMA) 30-Sep-2023 07:05:28   Date and Time of Study: 2023-09-29 12:38:49      ECG 12 Lead Chest Pain    (Results Pending)         Echocardiogram:    Results for orders placed during the hospital encounter of 08/29/23    Adult Transthoracic Echo Complete W/ Cont if Necessary Per Protocol    Interpretation Summary    Left ventricular ejection fraction appears to be 56 - 60%.    The left atrial cavity is moderately dilated.        Stress Test:  Results for orders placed during the hospital encounter of 09/29/23    Stress Test With Myocardial Perfusion One Day    Interpretation Summary    Left ventricular ejection fraction is normal (Calculated EF = 59%).    Myocardial perfusion imaging indicates a moderate-sized infarct located in the basal inferior lateral wall with mild dickson-infarct ischemia.    Impressions are consistent with an intermediate risk study.    Findings consistent with a normal ECG stress test.        Cardiac Catheterization:  No results found for this or any previous visit.        Other:      ASSESSMENT & PLAN:    Principal Problem:    Chest pain    1) Chest Pain  -Troponin negative x2  -EKG shows no acute ST abnormalities  -CXR shows no acute findings  -TSH WNL  - nuclear stress testing which showed evidence of a moderate-sized infarct of the basal inferior lateral wall with mild dickson-infarct ischemia.      2) atrial fibrillation with CVR  - failed cardioversion in the past  - Last 2D echo 8/2023 showed an EF of 56 to 60% with mild to moderate MR and mild TR.    - anticoagulated with Xarelto, currently on lovenox    3) reported hx hypertrophic cardiomyopathy    4) VHD - mitral valve regurgitation      5) HTN    6) HLD    7) asthma    8) HANY   - uses CPAP    9) family history of premature CAD in a first-degree relative    Given his classical chest pain symptoms and positive stress test with inferolateral infarct and periinfarct ischemia with risk factors  including family history, HTN, HLD will proceed with cardiac catheterization. Risks and benefits discussed with patient and he is agreeable to proceed.  Currently chest pain free. Hold xarelto in anticipation of cath. Will get last dose of lovenox liyah Dyer MD  09/30/23  12:44 EDT

## 2023-09-30 NOTE — H&P
"[facility] H&P    Patient Name: Gamaliel Vogt  : 1965  MRN: [id number]  Primary Care Physician: Rhea Carlson MD  Date of admission: 2023     Patient Care Team:  Rhea Carlson MD as PCP - General (Family Medicine)  Yordy Millan MD as Consulting Physician (Cardiology)          Subjective   History Present Illness     Chief Complaint:   Chief Complaint   Patient presents with    Chest Pain     Chest Pain     Mr. Vogt is a 58 y.o.  presents to [facility] complaining of chest pain      History of Present Illness    ED 23: Patient is a 58-year-old who presents emergency department today with chest pain. He states he had it yesterday. He feels it to the left sternal area radiating axillary and he feels it in his back and going into his neck. The pain started suddenly. It does wax and wane. He has a history of hypertrophic cardiomyopathy. He does also have a history of atrial fibrillation. He follows with Dr. Millan. He has never had a heart catheterization. He does not think he has had a stress test before. But he does have a strong family history of heart disease with multiple siblings that have had bypass surgery and further cardiac care. He has no shortness of breath. No new swelling. He is on Xarelto for A-fib. He has been compliant. He did follow-up with Dr. Millan last month. He did have a new echocardiogram done then. Apparently his EKG was \"worse\".     Observation 23: Patient is a 58-year-old present to the hospital with complaints of left-sided chest pain which radiated to his back and neck.  Patient states states he initially ignored the pain but decided present to ED due to continuing chest pain.  Patient does have history of atrial fibrillation, mitral valve regurgitation and sleep apnea.  Patient does state he has family history of cardiac disease.  Patient denies upon examination today chest pain, palpitations or shortness of breath.  Patient did have " abnormal myoview with cardiology, consulted for cardiac catheterization scheduled tomorrow.    Review of Systems   Constitutional: Negative.   HENT: Negative.     Eyes: Negative.    Cardiovascular:  Positive for palpitations.   Respiratory: Negative.     Endocrine: Negative.    Hematologic/Lymphatic: Negative.    Skin: Negative.    Musculoskeletal: Negative.    Gastrointestinal: Negative.    Genitourinary: Negative.    Neurological: Negative.    Psychiatric/Behavioral: Negative.     Allergic/Immunologic: Negative.          Personal History     Past Medical History:   Past Medical History:   Diagnosis Date    Abnormal ECG     Arrhythmia     Asthma     Atrial fibrillation     Congenital heart disease     Coronary artery disease     Heart murmur     Hyperlipidemia     Hypertension     Sleep apnea     CPap        Surgical History:      Past Surgical History:   Procedure Laterality Date    RETINAL DETACHMENT SURGERY      VITRECTOMY PARS PLANA Left 08/11/2020    Procedure: VITRECTOMY PARS PLANA  25GA  WITH ENDOLASER AND  FLUID GAS EXCHANGE LEFT EYE;  Surgeon: Lazarus, Howard S., MD;  Location: [facility];  Service: Ophthalmology;  Laterality: Left;           Family History: family history includes Arrhythmia in his brother; Asthma in his mother; Heart attack in his brother; Heart disease in his brother and mother; No Known Problems in his father, maternal aunt, maternal grandfather, maternal grandmother, maternal uncle, paternal aunt, paternal grandfather, paternal grandmother, paternal uncle, sister, and another family member. Otherwise pertinent FHx was reviewed and unremarkable.     Social History:  reports that he has never smoked. He has never been exposed to tobacco smoke. He has never used smokeless tobacco. He reports that he does not drink alcohol and does not use drugs.      Medications:  Prior to Admission medications    Medication Sig Start Date End Date Taking? Authorizing Provider   atenolol (TENORMIN)  25 MG tablet Take 1 tablet by mouth Daily. 4/26/18  Yes Provider, MD Maliha   LORazepam (ATIVAN) 1 MG tablet Take 1 tablet by mouth Every Morning. And if needed at night, but does not take 2nd dose often 10/24/16  Yes Provider, MD Maliha   rivaroxaban (XARELTO) 20 MG tablet Take 1 tablet by mouth Daily.   Yes Provider, MD Maliha       Allergies:  No Known Allergies    Objective   Objective     Vital Signs  Temp:  [95.4 °F (35.2 °C)-98.1 °F (36.7 °C)] 95.4 °F (35.2 °C)  Heart Rate:  [47-71] 67  Resp:  [15-20] 19  BP: (100-140)/() 122/81  SpO2:  [95 %-100 %] 97 %  on   ;   Device (Oxygen Therapy): room air  Body mass index is 33.4 kg/m².    Physical Exam  Vitals and nursing note reviewed.   Constitutional:       Appearance: Normal appearance.   HENT:      Head: Normocephalic and atraumatic.      Right Ear: External ear normal.      Left Ear: External ear normal.      Nose: Nose normal.      Mouth/Throat:      Pharynx: Oropharynx is clear.   Eyes:      Extraocular Movements: Extraocular movements intact.      Conjunctiva/sclera: Conjunctivae normal.      Pupils: Pupils are equal, round, and reactive to light.   Cardiovascular:      Rate and Rhythm: Normal rate. Rhythm irregular.      Heart sounds: Murmur heard.   Pulmonary:      Effort: Pulmonary effort is normal.      Breath sounds: Normal breath sounds.   Abdominal:      General: Bowel sounds are normal.      Palpations: Abdomen is soft.   Musculoskeletal:         General: Normal range of motion.      Cervical back: Normal range of motion.   Skin:     General: Skin is warm.      Capillary Refill: Capillary refill takes less than 2 seconds.   Neurological:      Mental Status: He is alert and oriented to person, place, and time.   Psychiatric:         Mood and Affect: Mood normal.         Behavior: Behavior normal.         Thought Content: Thought content normal.         Judgment: Judgment normal.         Results Review:  I have personally reviewed  most recent cardiac tracings, lab results, and radiology images and interpretations and agree with findings, most notably: CBC, CMP, BNP, troponin, EKG, chest x-ray, stress test.    Results from last 7 days   Lab Units 09/30/23  0340 09/29/23  1537   WBC 10*3/mm3 8.50 7.50   HEMOGLOBIN g/dL 14.6 15.9   HEMATOCRIT % 44.4 47.8   PLATELETS 10*3/mm3 197 216   INR   --  1.10     Results from last 7 days   Lab Units 09/30/23  0340 09/29/23  1739 09/29/23  1537   SODIUM mmol/L 135*  --  142   POTASSIUM mmol/L 3.6  --  4.5   CHLORIDE mmol/L 100  --  102   CO2 mmol/L 28.0  --  30.0*   BUN mg/dL 18  --  16   CREATININE mg/dL 1.10  --  1.11   GLUCOSE mg/dL 100*  --  81   CALCIUM mg/dL 9.2  --  10.5   ALT (SGPT) U/L  --   --  26   AST (SGOT) U/L  --   --  31   HSTROP T ng/L  --  20* 21*   PROBNP pg/mL  --   --  962.6*     Estimated Creatinine Clearance: 88.2 mL/min (by C-G formula based on SCr of 1.1 mg/dL).  Brief Urine Lab Results       None            Microbiology Results (last 10 days)       ** No results found for the last 240 hours. **            ECG/EMG Results (most recent)       Procedure Component Value Units Date/Time    ECG 12 Lead Chest Pain [[id number]] Collected: 09/29/23 1238     Updated: 09/30/23 0705     QT Interval 407 ms      QTC Interval 427 ms     Narrative:      HEART RATE= 66  bpm  RR Interval= 910  ms  OH Interval=   ms  P Horizontal Axis=   deg  P Front Axis=   deg  QRSD Interval= 94  ms  QT Interval= 407  ms  QTcB= 427  ms  QRS Axis= -19  deg  T Wave Axis= 110  deg  - ABNORMAL ECG -  Atrial fibrillation  When compared with ECG of 11-Aug-2020 12:28:06,  Significant change in rhythm  Significant repolarization change  Electronically Signed By: Levi Blank) 30-Sep-2023 07:05:28  Date and Time of Study: 2023-09-29 12:38:49                Results for orders placed during the hospital encounter of 08/29/23    Adult Transthoracic Echo Complete W/ Cont if Necessary Per Protocol    Interpretation  Summary    Left ventricular ejection fraction appears to be 56 - 60%.    The left atrial cavity is moderately dilated.      XR Chest 1 View    Result Date: 9/29/2023  1.No evidence for acute cardiopulmonary process. Electronically Signed: Kevin Fuller MD  9/29/2023 3:09 PM EDT  Workstation ID: [id number]       Estimated Creatinine Clearance: 88.2 mL/min (by C-G formula based on SCr of 1.1 mg/dL).    Assessment & Plan   Assessment/Plan       Active Hospital Problems    Diagnosis  POA    **Chest pain [R07.9]  Yes    Abnormal nuclear stress test [R94.39]  Unknown      Resolved Hospital Problems   No resolved problems to display.     Chest pain  Lab Results   Component Value Date    TROPONINT 20 (H) 09/29/2023    TROPONINT 21 (H) 09/29/2023   -.6  -IV Lasix given  -Chest X-ray: No acute cardiopulmonary process seen  -EKG: Atrial fibrillation heart of 66  -Stress Test ordered moderate size infarct located in inferior lateral wall with intermediate risk study  -Echocardiogram 8/29/2023 showed EF 56-60& with moderate mitral regurgitation   -Telemetry  -Cardiology consulted for cardiac catheterization tomorrow    Atrial fibrillation/ Hypertension  BP Readings from Last 1 Encounters:   09/30/23 122/81   -Continue atenolol  -Hold Xarelto, Lovenox initiated    Anxiety disorder  -Continue lorazepam as needed    HANY  -BiPAP nightly    Obesity  -BMI 33.4  -Lifestyle modifications      VTE Prophylaxis -   Mechanical Order History:       None          Pharmalogical Order History:        Ordered     Dose Route Frequency Stop    09/29/23 1749  Enoxaparin Sodium (LOVENOX) syringe 100 mg         100 mg SC Every 12 Hours --    09/29/23 1654  Enoxaparin Sodium (LOVENOX) syringe 40 mg  Status:  Discontinued         40 mg SC Every 24 Hours 09/29/23 1749                    CODE STATUS:    Code Status and Medical Interventions:   Ordered at: 09/29/23 1635     Level Of Support Discussed With:    Patient     Code Status (Patient has no  pulse and is not breathing):    CPR (Attempt to Resuscitate)     Medical Interventions (Patient has pulse or is breathing):    Full Support       This patient has been examined wearing personal protective equipment.     I discussed the patient's findings and my recommendations with patient, family, nursing staff, primary care team, and consulting provider.      Signature:Electronically signed by SANDEEP Corral, 09/30/23, 2:08 PM EDT.          I spent 35 minutes caring for Gamaliel on this date of service. This time includes time spent by me in the following activities: reviewing tests, obtaining and/or reviewing a separately obtained history, performing a medically appropriate examination and/or evaluation, counseling and educating the patient/family/caregiver, ordering medications, tests, or procedures, referring and communicating with other health care professionals, documenting information in the medical record, independently interpreting results and communicating that information with the patient/family/caregiver, and care coordination.

## 2023-10-01 VITALS
BODY MASS INDEX: 32.92 KG/M2 | HEART RATE: 58 BPM | RESPIRATION RATE: 18 BRPM | DIASTOLIC BLOOD PRESSURE: 97 MMHG | OXYGEN SATURATION: 99 % | TEMPERATURE: 96.8 F | WEIGHT: 229.94 LBS | SYSTOLIC BLOOD PRESSURE: 142 MMHG | HEIGHT: 70 IN

## 2023-10-01 LAB
ANION GAP SERPL CALCULATED.3IONS-SCNC: 11 MMOL/L (ref 5–15)
BASOPHILS # BLD AUTO: 0.1 10*3/MM3 (ref 0–0.2)
BASOPHILS NFR BLD AUTO: 1 % (ref 0–1.5)
BUN SERPL-MCNC: 16 MG/DL (ref 6–20)
BUN/CREAT SERPL: 16 (ref 7–25)
CALCIUM SPEC-SCNC: 9 MG/DL (ref 8.6–10.5)
CHLORIDE SERPL-SCNC: 102 MMOL/L (ref 98–107)
CO2 SERPL-SCNC: 24 MMOL/L (ref 22–29)
CREAT SERPL-MCNC: 1 MG/DL (ref 0.76–1.27)
DEPRECATED RDW RBC AUTO: 43.3 FL (ref 37–54)
EGFRCR SERPLBLD CKD-EPI 2021: 86.7 ML/MIN/1.73
EOSINOPHIL # BLD AUTO: 0.4 10*3/MM3 (ref 0–0.4)
EOSINOPHIL NFR BLD AUTO: 4.3 % (ref 0.3–6.2)
ERYTHROCYTE [DISTWIDTH] IN BLOOD BY AUTOMATED COUNT: 13.4 % (ref 12.3–15.4)
GLUCOSE SERPL-MCNC: 106 MG/DL (ref 65–99)
HCT VFR BLD AUTO: 47.6 % (ref 37.5–51)
HGB BLD-MCNC: 15.6 G/DL (ref 13–17.7)
LYMPHOCYTES # BLD AUTO: 2 10*3/MM3 (ref 0.7–3.1)
LYMPHOCYTES NFR BLD AUTO: 21.6 % (ref 19.6–45.3)
MCH RBC QN AUTO: 30.4 PG (ref 26.6–33)
MCHC RBC AUTO-ENTMCNC: 32.7 G/DL (ref 31.5–35.7)
MCV RBC AUTO: 92.8 FL (ref 79–97)
MONOCYTES # BLD AUTO: 1 10*3/MM3 (ref 0.1–0.9)
MONOCYTES NFR BLD AUTO: 10.6 % (ref 5–12)
NEUTROPHILS NFR BLD AUTO: 5.7 10*3/MM3 (ref 1.7–7)
NEUTROPHILS NFR BLD AUTO: 62.5 % (ref 42.7–76)
NRBC BLD AUTO-RTO: 0 /100 WBC (ref 0–0.2)
PLATELET # BLD AUTO: 196 10*3/MM3 (ref 140–450)
PMV BLD AUTO: 10.1 FL (ref 6–12)
POTASSIUM SERPL-SCNC: 3.8 MMOL/L (ref 3.5–5.2)
RBC # BLD AUTO: 5.13 10*6/MM3 (ref 4.14–5.8)
SODIUM SERPL-SCNC: 137 MMOL/L (ref 136–145)
WBC NRBC COR # BLD: 9.2 10*3/MM3 (ref 3.4–10.8)

## 2023-10-01 PROCEDURE — 93458 L HRT ARTERY/VENTRICLE ANGIO: CPT | Performed by: INTERNAL MEDICINE

## 2023-10-01 PROCEDURE — 25010000002 MIDAZOLAM PER 1 MG: Performed by: INTERNAL MEDICINE

## 2023-10-01 PROCEDURE — 99152 MOD SED SAME PHYS/QHP 5/>YRS: CPT | Performed by: INTERNAL MEDICINE

## 2023-10-01 PROCEDURE — 25010000002 HEPARIN (PORCINE) PER 1000 UNITS: Performed by: INTERNAL MEDICINE

## 2023-10-01 PROCEDURE — 93005 ELECTROCARDIOGRAM TRACING: CPT | Performed by: NURSE PRACTITIONER

## 2023-10-01 PROCEDURE — 85025 COMPLETE CBC W/AUTO DIFF WBC: CPT | Performed by: NURSE PRACTITIONER

## 2023-10-01 PROCEDURE — 25010000002 FENTANYL CITRATE (PF) 100 MCG/2ML SOLUTION: Performed by: INTERNAL MEDICINE

## 2023-10-01 PROCEDURE — 80048 BASIC METABOLIC PNL TOTAL CA: CPT | Performed by: NURSE PRACTITIONER

## 2023-10-01 PROCEDURE — C1769 GUIDE WIRE: HCPCS | Performed by: INTERNAL MEDICINE

## 2023-10-01 PROCEDURE — G0378 HOSPITAL OBSERVATION PER HR: HCPCS

## 2023-10-01 PROCEDURE — C1894 INTRO/SHEATH, NON-LASER: HCPCS | Performed by: INTERNAL MEDICINE

## 2023-10-01 PROCEDURE — 25010000002 NITROGLYCERIN 5 MG/ML SOLUTION: Performed by: INTERNAL MEDICINE

## 2023-10-01 PROCEDURE — 25510000001 IOPAMIDOL PER 1 ML: Performed by: INTERNAL MEDICINE

## 2023-10-01 PROCEDURE — 99232 SBSQ HOSP IP/OBS MODERATE 35: CPT | Performed by: INTERNAL MEDICINE

## 2023-10-01 RX ORDER — ONDANSETRON 4 MG/1
4 TABLET, FILM COATED ORAL EVERY 6 HOURS PRN
Status: DISCONTINUED | OUTPATIENT
Start: 2023-10-01 | End: 2023-10-01 | Stop reason: HOSPADM

## 2023-10-01 RX ORDER — NICARDIPINE HYDROCHLORIDE 2.5 MG/ML
INJECTION INTRAVENOUS
Status: DISCONTINUED | OUTPATIENT
Start: 2023-10-01 | End: 2023-10-01 | Stop reason: HOSPADM

## 2023-10-01 RX ORDER — NITROGLYCERIN 0.4 MG/1
0.4 TABLET SUBLINGUAL
Qty: 30 TABLET | Refills: 12 | Status: SHIPPED | OUTPATIENT
Start: 2023-10-01

## 2023-10-01 RX ORDER — MIDAZOLAM HYDROCHLORIDE 1 MG/ML
INJECTION INTRAMUSCULAR; INTRAVENOUS
Status: DISCONTINUED | OUTPATIENT
Start: 2023-10-01 | End: 2023-10-01 | Stop reason: HOSPADM

## 2023-10-01 RX ORDER — FENTANYL CITRATE 50 UG/ML
INJECTION, SOLUTION INTRAMUSCULAR; INTRAVENOUS
Status: DISCONTINUED | OUTPATIENT
Start: 2023-10-01 | End: 2023-10-01 | Stop reason: HOSPADM

## 2023-10-01 RX ORDER — HEPARIN SODIUM 1000 [USP'U]/ML
INJECTION, SOLUTION INTRAVENOUS; SUBCUTANEOUS
Status: DISCONTINUED | OUTPATIENT
Start: 2023-10-01 | End: 2023-10-01 | Stop reason: HOSPADM

## 2023-10-01 RX ORDER — LIDOCAINE HYDROCHLORIDE 20 MG/ML
INJECTION, SOLUTION INFILTRATION; PERINEURAL
Status: DISCONTINUED | OUTPATIENT
Start: 2023-10-01 | End: 2023-10-01 | Stop reason: HOSPADM

## 2023-10-01 RX ORDER — ONDANSETRON 2 MG/ML
4 INJECTION INTRAMUSCULAR; INTRAVENOUS EVERY 6 HOURS PRN
Status: DISCONTINUED | OUTPATIENT
Start: 2023-10-01 | End: 2023-10-01 | Stop reason: HOSPADM

## 2023-10-01 RX ORDER — NITROGLYCERIN 5 MG/ML
INJECTION, SOLUTION INTRAVENOUS
Status: DISCONTINUED | OUTPATIENT
Start: 2023-10-01 | End: 2023-10-01 | Stop reason: HOSPADM

## 2023-10-01 RX ORDER — ACETAMINOPHEN 325 MG/1
650 TABLET ORAL EVERY 4 HOURS PRN
Status: DISCONTINUED | OUTPATIENT
Start: 2023-10-01 | End: 2023-10-01 | Stop reason: HOSPADM

## 2023-10-01 RX ORDER — DIPHENHYDRAMINE HCL 25 MG
25 CAPSULE ORAL EVERY 6 HOURS PRN
Status: DISCONTINUED | OUTPATIENT
Start: 2023-10-01 | End: 2023-10-01 | Stop reason: HOSPADM

## 2023-10-01 RX ADMIN — ATENOLOL 25 MG: 25 TABLET ORAL at 11:40

## 2023-10-01 NOTE — PROGRESS NOTES
Referring Provider: Levi Blank DO    Reason for follow-up: Chest pain with positive stress test  Patient Care Team:  Rhea Carlson MD as PCP - General (Family Medicine)  Yordy Millan MD as Consulting Physician (Cardiology)      SUBJECTIVE    Underwent cardiac catheterization this morning which did not show any obstructive CAD.  Did have large caliber RCA and left circumflex with slow flow in all 3 vessels which could explain some of his chest pain due to microvascular dysfunction and large caliber arteries.  LVEDP was also normal  He has remained chest pain-free.     ROS  Review of all systems negative except as indicated.    Since I have last seen, the patient has been without any chest discomfort, shortness of breath, palpitations, dizziness or syncope.  Denies having any headache, abdominal pain, nausea, vomiting, diarrhea, constipation, loss of weight or loss of appetite.  Denies having any excessive bruising, hematuria or blood in the stool.  ROS      Personal History:    Past Medical History:   Diagnosis Date    Abnormal ECG     Arrhythmia     Asthma     Atrial fibrillation     Congenital heart disease     Coronary artery disease     Heart murmur     Hyperlipidemia     Hypertension     Sleep apnea     CPap        Past Surgical History:   Procedure Laterality Date    RETINAL DETACHMENT SURGERY      VITRECTOMY PARS PLANA Left 08/11/2020    Procedure: VITRECTOMY PARS PLANA  25GA  WITH ENDOLASER AND  FLUID GAS EXCHANGE LEFT EYE;  Surgeon: Lazarus, Howard S., MD;  Location: [facility];  Service: Ophthalmology;  Laterality: Left;       Family History   Problem Relation Age of Onset    Asthma Mother     Heart disease Mother     No Known Problems Father     No Known Problems Sister     Arrhythmia Brother     Heart attack Brother     Heart disease Brother     No Known Problems Maternal Aunt     No Known Problems Maternal Uncle     No Known Problems Paternal Aunt     No Known Problems Paternal Uncle      No Known Problems Maternal Grandmother     No Known Problems Maternal Grandfather     No Known Problems Paternal Grandmother     No Known Problems Paternal Grandfather     No Known Problems Other     Anemia Neg Hx     Clotting disorder Neg Hx     Fainting Neg Hx     Heart failure Neg Hx     Hyperlipidemia Neg Hx     Hypertension Neg Hx        Social History     Tobacco Use    Smoking status: Never     Passive exposure: Never    Smokeless tobacco: Never   Vaping Use    Vaping Use: Never used   Substance Use Topics    Alcohol use: No    Drug use: Never        Home meds:  Prior to Admission medications    Medication Sig Start Date End Date Taking? Authorizing Provider   atenolol (TENORMIN) 25 MG tablet Take 1 tablet by mouth Daily. 4/26/18  Yes ProviderMaliha MD   LORazepam (ATIVAN) 1 MG tablet Take 1 tablet by mouth Every Morning. And if needed at night, but does not take 2nd dose often 10/24/16  Yes ProviderMaliha MD   rivaroxaban (XARELTO) 20 MG tablet Take 1 tablet by mouth Daily.   Yes ProviderMaliha MD       Allergies:  Patient has no known allergies.    Scheduled Meds:atenolol, 25 mg, Oral, Daily  LORazepam, 1 mg, Oral, QAM  senna-docusate sodium, 2 tablet, Oral, BID  sodium chloride, 10 mL, Intravenous, Q12H      Continuous Infusions:sodium chloride, 75 mL/hr, Last Rate: 75 mL/hr (10/01/23 0621)      PRN Meds:.  acetaminophen    senna-docusate sodium **AND** polyethylene glycol **AND** bisacodyl **AND** bisacodyl    hydrALAZINE    nitroglycerin    ondansetron **OR** ondansetron    [COMPLETED] Insert Peripheral IV **AND** sodium chloride    sodium chloride    sodium chloride      OBJECTIVE    Vital Signs  Vitals:    10/01/23 0140 10/01/23 0246 10/01/23 0422 10/01/23 0608   BP:  148/75  147/91   BP Location:  Right arm  Right arm   Patient Position:  Lying  Lying   Pulse:  69  68   Resp:  17  16   Temp:  97.9 °F (36.6 °C)  97.4 °F (36.3 °C)   TempSrc:  Oral  Oral   SpO2:  96%  96%  "  Weight: 104 kg (229 lb 15 oz)  104 kg (229 lb 15 oz)    Height:           Flowsheet Rows      Flowsheet Row First Filed Value   Admission Height 177.8 cm (70\") Documented at 09/29/2023 1250   Admission Weight 104 kg (230 lb) Documented at 09/29/2023 1250            No intake or output data in the 24 hours ending 10/01/23 0658       Telemetry: Atrial fibrillation with controlled ventricular response    Physical Exam:  The patient is alert, oriented and in no distress.  Vital signs as noted above.  Head and neck revealed no carotid bruits or jugular venous distention.  No thyromegaly or lymphadenopathy is present  Lungs clear.  No wheezing.  Breath sounds are normal bilaterally.  Heart normal first and second heart sounds.  Irregularly irregular rhythm.  No murmur. No precordial rub is present.  No gallop is present.  Abdomen soft and nontender.  No organomegaly is present.  Extremities with good peripheral pulses without any pedal edema.  Skin warm and dry.  Musculoskeletal system is grossly normal.  CNS grossly normal.       Results Review:  I have personally reviewed the results from the time of this admission to 10/1/2023 06:58 EDT and agree with these findings:  []  Laboratory  []  Microbiology  []  Radiology  []  EKG/Telemetry   []  Cardiology/Vascular   []  Pathology  []  Old records  []  Other:    Most notable findings include:    Lab Results (last 24 hours)       Procedure Component Value Units Date/Time    Basic Metabolic Panel [[id number]]  (Abnormal) Collected: 10/01/23 0357    Specimen: Blood from Arm, Right Updated: 10/01/23 0505     Glucose 106 mg/dL      BUN 16 mg/dL      Creatinine 1.00 mg/dL      Sodium 137 mmol/L      Potassium 3.8 mmol/L      Chloride 102 mmol/L      CO2 24.0 mmol/L      Calcium 9.0 mg/dL      BUN/Creatinine Ratio 16.0     Anion Gap 11.0 mmol/L      eGFR 86.7 mL/min/1.73     Narrative:      GFR Normal >60  Chronic Kidney Disease <60  Kidney Failure <15      CBC & Differential " [[id number]]  (Abnormal) Collected: 10/01/23 0357    Specimen: Blood from Arm, Right Updated: 10/01/23 0428    Narrative:      The following orders were created for panel order CBC & Differential.  Procedure                               Abnormality         Status                     ---------                               -----------         ------                     CBC Auto Differential[[id number]]        Abnormal            Final result                 Please view results for these tests on the individual orders.    CBC Auto Differential [[id number]]  (Abnormal) Collected: 10/01/23 0357    Specimen: Blood from Arm, Right Updated: 10/01/23 0428     WBC 9.20 10*3/mm3      RBC 5.13 10*6/mm3      Hemoglobin 15.6 g/dL      Hematocrit 47.6 %      MCV 92.8 fL      MCH 30.4 pg      MCHC 32.7 g/dL      RDW 13.4 %      RDW-SD 43.3 fl      MPV 10.1 fL      Platelets 196 10*3/mm3      Neutrophil % 62.5 %      Lymphocyte % 21.6 %      Monocyte % 10.6 %      Eosinophil % 4.3 %      Basophil % 1.0 %      Neutrophils, Absolute 5.70 10*3/mm3      Lymphocytes, Absolute 2.00 10*3/mm3      Monocytes, Absolute 1.00 10*3/mm3      Eosinophils, Absolute 0.40 10*3/mm3      Basophils, Absolute 0.10 10*3/mm3      nRBC 0.0 /100 WBC             Imaging Results (Last 24 Hours)       ** No results found for the last 24 hours. **            LAB RESULTS (LAST 7 DAYS)    CBC  Results from last 7 days   Lab Units 10/01/23  0357 09/30/23  0340 09/29/23  1537   WBC 10*3/mm3 9.20 8.50 7.50   RBC 10*6/mm3 5.13 4.76 5.11   HEMOGLOBIN g/dL 15.6 14.6 15.9   HEMATOCRIT % 47.6 44.4 47.8   MCV fL 92.8 93.3 93.5   PLATELETS 10*3/mm3 196 197 216       BMP  Results from last 7 days   Lab Units 10/01/23  0357 09/30/23  0340 09/29/23  1537   SODIUM mmol/L 137 135* 142   POTASSIUM mmol/L 3.8 3.6 4.5   CHLORIDE mmol/L 102 100 102   CO2 mmol/L 24.0 28.0 30.0*   BUN mg/dL 16 18 16   CREATININE mg/dL 1.00 1.10 1.11   GLUCOSE mg/dL 106* 100* 81       CMP   Results from  last 7 days   Lab Units 10/01/23  0357 09/30/23  0340 09/29/23  1537   SODIUM mmol/L 137 135* 142   POTASSIUM mmol/L 3.8 3.6 4.5   CHLORIDE mmol/L 102 100 102   CO2 mmol/L 24.0 28.0 30.0*   BUN mg/dL 16 18 16   CREATININE mg/dL 1.00 1.10 1.11   GLUCOSE mg/dL 106* 100* 81   ALBUMIN g/dL  --   --  4.9   BILIRUBIN mg/dL  --   --  1.3*   ALK PHOS U/L  --   --  70   AST (SGOT) U/L  --   --  31   ALT (SGPT) U/L  --   --  26       BNP        TROPONIN  Results from last 7 days   Lab Units 09/29/23  1739   HSTROP T ng/L 20*       CoAg  Results from last 7 days   Lab Units 09/29/23  1537   INR  1.10   APTT seconds 32.1*       Creatinine Clearance  Estimated Creatinine Clearance: 96.1 mL/min (by C-G formula based on SCr of 1 mg/dL).    ABG        Radiology  XR Chest 1 View    Result Date: 9/29/2023  1.No evidence for acute cardiopulmonary process. Electronically Signed: Kevin Fuller MD  9/29/2023 3:09 PM EDT  Workstation ID: [id number]       EKG  I personally viewed and interpreted the patient's EKG/Telemetry data:  ECG 12 Lead Chest Pain   Final Result   HEART RATE= 66  bpm   RR Interval= 910  ms   WV Interval=   ms   P Horizontal Axis=   deg   P Front Axis=   deg   QRSD Interval= 94  ms   QT Interval= 407  ms   QTcB= 427  ms   QRS Axis= -19  deg   T Wave Axis= 110  deg   - ABNORMAL ECG -   Atrial fibrillation   When compared with ECG of 11-Aug-2020 12:28:06,   Significant change in rhythm   Significant repolarization change   Electronically Signed By: Levi Blank ([facility]) 30-Sep-2023 07:05:28   Date and Time of Study: 2023-09-29 12:38:49      ECG 12 Lead Chest Pain    (Results Pending)         Echocardiogram:    Results for orders placed during the hospital encounter of 08/29/23    Adult Transthoracic Echo Complete W/ Cont if Necessary Per Protocol    Interpretation Summary    Left ventricular ejection fraction appears to be 56 - 60%.    The left atrial cavity is moderately dilated.        Stress Test:  Results for orders  placed during the hospital encounter of 09/29/23    Stress Test With Myocardial Perfusion One Day    Interpretation Summary    Left ventricular ejection fraction is normal (Calculated EF = 59%).    Myocardial perfusion imaging indicates a moderate-sized infarct located in the basal inferior lateral wall with mild dickson-infarct ischemia.    Impressions are consistent with an intermediate risk study.    Findings consistent with a normal ECG stress test.         Cardiac Catheterization:  No results found for this or any previous visit.         Other:         ASSESSMENT & PLAN:    Principal Problem:    Chest pain  Active Problems:    Abnormal nuclear stress test    Chest pain  Now resolved troponins were negative  False positive stress test  Cardiac catheterization did not show any evidence of obstructive CAD  He may have microvascular dysfunction  He does have ectatic coronary arteries  Discussed with him that he could be started on calcium channel blocker or long-acting nitro in the future if he continues to have chest pain    Atrial fibrillation with controlled ventricular rate  Failed cardioversion in the past  Continue with Xarelto    Possible history of hypertrophic cardiomyopathy  Says he has been told this in the past but has not been seen on any of his echocardiograms recently  I explained to him that we could consider cardiac MRI outpatient for definitive answer but he does not have any signs or symptoms of hypertrophic cardiomyopathy currently.    Hypertension  Well-controlled    Patient can be discharged from cardiac standpoint after TR band has come off.  Gemini Dyer MD  10/01/23  06:58 EDT

## 2023-10-01 NOTE — PLAN OF CARE
Goal Outcome Evaluation:              Outcome Evaluation: Admitted for chest pain, Pt reporting no chest pain this shift. Pt had abnormal stress test yesterday, scheduled for heart cath this AM. NPO since midnight. VSS. Care continues.

## 2023-10-01 NOTE — DISCHARGE SUMMARY
"[facility]    Rhea Carlson MD    CHIEF COMPLAINT:     Chest Pain     HISTORY OF PRESENT ILLNESS:    [facility]    ED 9/29/23: Patient is a 58-year-old who presents emergency department today with chest pain. He states he had it yesterday. He feels it to the left sternal area radiating axillary and he feels it in his back and going into his neck. The pain started suddenly. It does wax and wane. He has a history of hypertrophic cardiomyopathy. He does also have a history of atrial fibrillation. He follows with Dr. Millan. He has never had a heart catheterization. He does not think he has had a stress test before. But he does have a strong family history of heart disease with multiple siblings that have had bypass surgery and further cardiac care. He has no shortness of breath. No new swelling. He is on Xarelto for A-fib. He has been compliant. He did follow-up with Dr. Millan last month. He did have a new echocardiogram done then. Apparently his EKG was \"worse\".      Observation 9/30/23: Patient is a 58-year-old present to the hospital with complaints of left-sided chest pain which radiated to his back and neck.  Patient states states he initially ignored the pain but decided present to ED due to continuing chest pain.  Patient does have history of atrial fibrillation, mitral valve regurgitation and sleep apnea.  Patient does state he has family history of cardiac disease.  Patient denies upon examination today chest pain, palpitations or shortness of breath.  Patient did have abnormal myoview with cardiology, consulted for cardiac catheterization scheduled tomorrow.    Past Medical History:   Diagnosis Date    Abnormal ECG     Arrhythmia     Asthma     Atrial fibrillation     Congenital heart disease     Coronary artery disease     Heart murmur     Hyperlipidemia     Hypertension     Sleep apnea     CPap      Past Surgical History:   Procedure Laterality Date    RETINAL DETACHMENT SURGERY      " VITRECTOMY PARS PLANA Left 08/11/2020    Procedure: VITRECTOMY PARS PLANA  25GA  WITH ENDOLASER AND  FLUID GAS EXCHANGE LEFT EYE;  Surgeon: Lazarus, Howard S., MD;  Location: [facility] MAIN OR;  Service: Ophthalmology;  Laterality: Left;     Family History   Problem Relation Age of Onset    Asthma Mother     Heart disease Mother     No Known Problems Father     No Known Problems Sister     Arrhythmia Brother     Heart attack Brother     Heart disease Brother     No Known Problems Maternal Aunt     No Known Problems Maternal Uncle     No Known Problems Paternal Aunt     No Known Problems Paternal Uncle     No Known Problems Maternal Grandmother     No Known Problems Maternal Grandfather     No Known Problems Paternal Grandmother     No Known Problems Paternal Grandfather     No Known Problems Other     Anemia Neg Hx     Clotting disorder Neg Hx     Fainting Neg Hx     Heart failure Neg Hx     Hyperlipidemia Neg Hx     Hypertension Neg Hx      Social History     Tobacco Use    Smoking status: Never     Passive exposure: Never    Smokeless tobacco: Never   Vaping Use    Vaping Use: Never used   Substance Use Topics    Alcohol use: No    Drug use: Never     Medications Prior to Admission   Medication Sig Dispense Refill Last Dose    atenolol (TENORMIN) 25 MG tablet Take 1 tablet by mouth Daily.   9/28/2023    LORazepam (ATIVAN) 1 MG tablet Take 1 tablet by mouth Every Morning. And if needed at night, but does not take 2nd dose often   9/29/2023    rivaroxaban (XARELTO) 20 MG tablet Take 1 tablet by mouth Daily.   9/28/2023     Allergies:  Patient has no known allergies.      There is no immunization history on file for this patient.        REVIEW OF SYSTEMS:    Review of Systems   Constitutional: Negative.   HENT: Negative.     Eyes: Negative.    Cardiovascular: Negative.    Respiratory: Negative.     Endocrine: Negative.    Hematologic/Lymphatic: Negative.    Skin: Negative.    Musculoskeletal: Negative.     Gastrointestinal: Negative.    Genitourinary: Negative.    Neurological: Negative.    Psychiatric/Behavioral:  The patient is nervous/anxious.    Allergic/Immunologic: Negative.      Vital Signs  Temp:  [96 °F (35.6 °C)-98.7 °F (37.1 °C)] 96 °F (35.6 °C)  Heart Rate:  [54-74] 73  Resp:  [16-18] 18  BP: (127-157)/() 138/105          Physical Exam:  Physical Exam  Vitals and nursing note reviewed.   Constitutional:       Appearance: Normal appearance.   HENT:      Head: Normocephalic and atraumatic.      Right Ear: External ear normal.      Left Ear: External ear normal.      Nose: Nose normal.      Mouth/Throat:      Pharynx: Oropharynx is clear.   Eyes:      Extraocular Movements: Extraocular movements intact.      Conjunctiva/sclera: Conjunctivae normal.      Pupils: Pupils are equal, round, and reactive to light.   Cardiovascular:      Rate and Rhythm: Normal rate. Rhythm irregular.      Pulses: Normal pulses.      Heart sounds: Normal heart sounds.   Pulmonary:      Effort: Pulmonary effort is normal.      Breath sounds: Normal breath sounds.   Abdominal:      General: Bowel sounds are normal.   Musculoskeletal:         General: Normal range of motion.      Cervical back: Normal range of motion.   Skin:     General: Skin is warm.      Capillary Refill: Capillary refill takes less than 2 seconds.   Neurological:      Mental Status: He is alert and oriented to person, place, and time.   Psychiatric:         Mood and Affect: Mood normal.         Behavior: Behavior normal.         Thought Content: Thought content normal.         Judgment: Judgment normal.       Emotional Behavior:    WNl   Debilities:   none  Results Review:    I reviewed the patient's new clinical results.  Lab Results (most recent)       Procedure Component Value Units Date/Time    Basic Metabolic Panel [[id number]]  (Abnormal) Collected: 10/01/23 0357    Specimen: Blood from Arm, Right Updated: 10/01/23 0501     Glucose 106 mg/dL      BUN  16 mg/dL      Creatinine 1.00 mg/dL      Sodium 137 mmol/L      Potassium 3.8 mmol/L      Chloride 102 mmol/L      CO2 24.0 mmol/L      Calcium 9.0 mg/dL      BUN/Creatinine Ratio 16.0     Anion Gap 11.0 mmol/L      eGFR 86.7 mL/min/1.73     Narrative:      GFR Normal >60  Chronic Kidney Disease <60  Kidney Failure <15      CBC & Differential [[id number]]  (Abnormal) Collected: 10/01/23 0357    Specimen: Blood from Arm, Right Updated: 10/01/23 0428    Narrative:      The following orders were created for panel order CBC & Differential.  Procedure                               Abnormality         Status                     ---------                               -----------         ------                     CBC Auto Differential[[id number]]        Abnormal            Final result                 Please view results for these tests on the individual orders.    CBC Auto Differential [[id number]]  (Abnormal) Collected: 10/01/23 0357    Specimen: Blood from Arm, Right Updated: 10/01/23 0428     WBC 9.20 10*3/mm3      RBC 5.13 10*6/mm3      Hemoglobin 15.6 g/dL      Hematocrit 47.6 %      MCV 92.8 fL      MCH 30.4 pg      MCHC 32.7 g/dL      RDW 13.4 %      RDW-SD 43.3 fl      MPV 10.1 fL      Platelets 196 10*3/mm3      Neutrophil % 62.5 %      Lymphocyte % 21.6 %      Monocyte % 10.6 %      Eosinophil % 4.3 %      Basophil % 1.0 %      Neutrophils, Absolute 5.70 10*3/mm3      Lymphocytes, Absolute 2.00 10*3/mm3      Monocytes, Absolute 1.00 10*3/mm3      Eosinophils, Absolute 0.40 10*3/mm3      Basophils, Absolute 0.10 10*3/mm3      nRBC 0.0 /100 WBC     Basic Metabolic Panel [[id number]]  (Abnormal) Collected: 09/30/23 0340    Specimen: Blood from Arm, Right Updated: 09/30/23 0436     Glucose 100 mg/dL      BUN 18 mg/dL      Creatinine 1.10 mg/dL      Sodium 135 mmol/L      Potassium 3.6 mmol/L      Chloride 100 mmol/L      CO2 28.0 mmol/L      Calcium 9.2 mg/dL      BUN/Creatinine Ratio 16.4     Anion Gap 7.0 mmol/L       eGFR 77.3 mL/min/1.73     Narrative:      GFR Normal >60  Chronic Kidney Disease <60  Kidney Failure <15      CBC & Differential [[id number]]  (Abnormal) Collected: 09/30/23 0340    Specimen: Blood from Arm, Right Updated: 09/30/23 0421    Narrative:      The following orders were created for panel order CBC & Differential.  Procedure                               Abnormality         Status                     ---------                               -----------         ------                     CBC Auto Differential[[id number]]        Abnormal            Final result                 Please view results for these tests on the individual orders.    CBC Auto Differential [[id number]]  (Abnormal) Collected: 09/30/23 0340    Specimen: Blood from Arm, Right Updated: 09/30/23 0421     WBC 8.50 10*3/mm3      RBC 4.76 10*6/mm3      Hemoglobin 14.6 g/dL      Hematocrit 44.4 %      MCV 93.3 fL      MCH 30.7 pg      MCHC 32.9 g/dL      RDW 13.5 %      RDW-SD 43.3 fl      MPV 10.1 fL      Platelets 197 10*3/mm3      Neutrophil % 61.0 %      Lymphocyte % 22.2 %      Monocyte % 10.2 %      Eosinophil % 5.6 %      Basophil % 1.0 %      Neutrophils, Absolute 5.20 10*3/mm3      Lymphocytes, Absolute 1.90 10*3/mm3      Monocytes, Absolute 0.90 10*3/mm3      Eosinophils, Absolute 0.50 10*3/mm3      Basophils, Absolute 0.10 10*3/mm3      nRBC 0.0 /100 WBC     High Sensitivity Troponin T 2Hr [[id number]]  (Abnormal) Collected: 09/29/23 1739    Specimen: Blood from Arm, Left Updated: 09/29/23 1812     HS Troponin T 20 ng/L      Troponin T Delta -1 ng/L     Narrative:      High Sensitive Troponin T Reference Range:  <10.0 ng/L- Negative Female for AMI  <15.0 ng/L- Negative Male for AMI  >=10 - Abnormal Female indicating possible myocardial injury.  >=15 - Abnormal Male indicating possible myocardial injury.   Clinicians would have to utilize clinical acumen, EKG, Troponin, and serial changes to determine if it is an Acute Myocardial  Infarction or myocardial injury due to an underlying chronic condition.         BNP [[id number]]  (Abnormal) Collected: 09/29/23 1537    Specimen: Blood from Arm, Left Updated: 09/29/23 1707     proBNP 962.6 pg/mL     Narrative:      This assay is used as an aid in the diagnosis of individuals suspected of having heart failure. It can be used as an aid in the diagnosis of acute decompensated heart failure (ADHF) in patients presenting with signs and symptoms of ADHF to the emergency department (ED). In addition, NT-proBNP of <300 pg/mL indicates ADHF is not likely.    TSH [[id number]]  (Normal) Collected: 09/29/23 1537    Specimen: Blood from Arm, Left Updated: 09/29/23 1707     TSH 2.310 uIU/mL     T4, Free [[id number]]  (Abnormal) Collected: 09/29/23 1537    Specimen: Blood from Arm, Left Updated: 09/29/23 1707     Free T4 0.80 ng/dL     Narrative:      Results may be falsely increased if patient taking Biotin.      Uric Acid [[id number]]  (Abnormal) Collected: 09/29/23 1537    Specimen: Blood from Arm, Left Updated: 09/29/23 1701     Uric Acid 7.3 mg/dL     Lipid Panel [[id number]]  (Abnormal) Collected: 09/29/23 1537    Specimen: Blood from Arm, Left Updated: 09/29/23 1701     Total Cholesterol 177 mg/dL      Triglycerides 92 mg/dL      HDL Cholesterol 48 mg/dL      LDL Cholesterol  112 mg/dL      VLDL Cholesterol 17 mg/dL      LDL/HDL Ratio 2.30    Narrative:      Cholesterol Reference Ranges  (U.S. Department of Health and Human Services ATP III Classifications)    Desirable          <200 mg/dL  Borderline High    200-239 mg/dL  High Risk          >240 mg/dL      Triglyceride Reference Ranges  (U.S. Department of Health and Human Services ATP III Classifications)    Normal           <150 mg/dL  Borderline High  150-199 mg/dL  High             200-499 mg/dL  Very High        >500 mg/dL    HDL Reference Ranges  (U.S. Department of Health and Human Services ATP III Classifications)    Low     <40 mg/dl (major risk  factor for CHD)  High    >60 mg/dl ('negative' risk factor for CHD)        LDL Reference Ranges  (U.S. Department of Health and Human Services ATP III Classifications)    Optimal          <100 mg/dL  Near Optimal     100-129 mg/dL  Borderline High  130-159 mg/dL  High             160-189 mg/dL  Very High        >189 mg/dL    Hemoglobin A1c [[id number]]  (Normal) Collected: 09/29/23 1537    Specimen: Blood from Arm, Left Updated: 09/29/23 1654     Hemoglobin A1C 5.60 %     Comprehensive Metabolic Panel [[id number]]  (Abnormal) Collected: 09/29/23 1537    Specimen: Blood from Arm, Left Updated: 09/29/23 1630     Glucose 81 mg/dL      BUN 16 mg/dL      Creatinine 1.11 mg/dL      Sodium 142 mmol/L      Potassium 4.5 mmol/L      Comment: Slight hemolysis detected by analyzer. Results may be affected.        Chloride 102 mmol/L      CO2 30.0 mmol/L      Calcium 10.5 mg/dL      Total Protein 7.6 g/dL      Albumin 4.9 g/dL      ALT (SGPT) 26 U/L      AST (SGOT) 31 U/L      Comment: Slight hemolysis detected by analyzer. Results may be affected.        Alkaline Phosphatase 70 U/L      Total Bilirubin 1.3 mg/dL      Globulin 2.7 gm/dL      A/G Ratio 1.8 g/dL      BUN/Creatinine Ratio 14.4     Anion Gap 10.0 mmol/L      eGFR 76.5 mL/min/1.73     Narrative:      GFR Normal >60  Chronic Kidney Disease <60  Kidney Failure <15      High Sensitivity Troponin T [[id number]]  (Abnormal) Collected: 09/29/23 1537    Specimen: Blood from Arm, Left Updated: 09/29/23 1620     HS Troponin T 21 ng/L     Narrative:      High Sensitive Troponin T Reference Range:  <10.0 ng/L- Negative Female for AMI  <15.0 ng/L- Negative Male for AMI  >=10 - Abnormal Female indicating possible myocardial injury.  >=15 - Abnormal Male indicating possible myocardial injury.   Clinicians would have to utilize clinical acumen, EKG, Troponin, and serial changes to determine if it is an Acute Myocardial Infarction or myocardial injury due to an underlying chronic  condition.         Protime-INR [[id number]]  (Abnormal) Collected: 09/29/23 1537    Specimen: Blood from Arm, Left Updated: 09/29/23 1600     Protime 11.9 Seconds      INR 1.10    aPTT [[id number]]  (Abnormal) Collected: 09/29/23 1537    Specimen: Blood from Arm, Left Updated: 09/29/23 1600     PTT 32.1 seconds             Imaging Results (Most Recent)       Procedure Component Value Units Date/Time    XR Chest 1 View [[id number]] Collected: 09/29/23 1509     Updated: 09/29/23 1511    Narrative:      XR CHEST 1 VW    Date of Exam: 9/29/2023 3:07 PM EDT    Indication: chest pain    Comparison: None available.    FINDINGS:  No consolidations or pleural effusions are observed. The cardiac silhouette is within normal limits for size. The mediastinum is unremarkable. No acute osseous abnormalities are identified on this single view.      Impression:      1.No evidence for acute cardiopulmonary process.    Electronically Signed: Kevin Fuller MD    9/29/2023 3:09 PM EDT    Workstation ID: [id number]          reviewed    ECG/EMG Results (most recent)       Procedure Component Value Units Date/Time    ECG 12 Lead Chest Pain [[id number]] Collected: 09/29/23 1238     Updated: 09/30/23 0705     QT Interval 407 ms      QTC Interval 427 ms     Narrative:      HEART RATE= 66  bpm  RR Interval= 910  ms  IN Interval=   ms  P Horizontal Axis=   deg  P Front Axis=   deg  QRSD Interval= 94  ms  QT Interval= 407  ms  QTcB= 427  ms  QRS Axis= -19  deg  T Wave Axis= 110  deg  - ABNORMAL ECG -  Atrial fibrillation  When compared with ECG of 11-Aug-2020 12:28:06,  Significant change in rhythm  Significant repolarization change  Electronically Signed By: Levi Blank (SEEMA) 30-Sep-2023 07:05:28  Date and Time of Study: 2023-09-29 12:38:49          reviewed        Results for orders placed during the hospital encounter of 08/29/23    Adult Transthoracic Echo Complete W/ Cont if Necessary Per Protocol    Interpretation Summary    Left  ventricular ejection fraction appears to be 56 - 60%.    The left atrial cavity is moderately dilated.      Microbiology Results (last 10 days)       ** No results found for the last 240 hours. **            Assessment & Plan     Chest pain    Abnormal nuclear stress test        Chest pain        Lab Results   Component Value Date     TROPONINT 20 (H) 09/29/2023     TROPONINT 21 (H) 09/29/2023   -.6  -IV Lasix given  -Chest X-ray: No acute cardiopulmonary process seen  -EKG: Atrial fibrillation heart of 66  -Stress Test ordered moderate size infarct located in inferior lateral wall with intermediate risk study  -Echocardiogram 8/29/2023 showed EF 56-60% with moderate mitral regurgitation   -Telemetry  -Cardiology consulted for cardiac catheterization tomorrow     Atrial fibrillation/ Hypertension      BP Readings from Last 1 Encounters:   09/30/23 122/81   -Continue atenolol  -Hold Xarelto, Lovenox initiated     Anxiety disorder  -Continue lorazepam as needed     HANY  -BiPAP nightly     Obesity  -BMI 33.4  -Lifestyle modifications    I discussed the patients findings and my recommendations with patient and family.     Discharge Diagnosis:      Chest pain    Abnormal nuclear stress test      Hospital Course  Patient is a 58 y.o. male presented with chest pain.  Patient has history of atrial fibrillation and moderate mitral regurgitation and seen by Dr. Ralph.  Patient had slight bump in troponin at 2120.  .6.  Patient given one-time IV Lasix.  Chest x-ray showed no acute cardiopulmonary process.  EKG showed atrial fibrillation controlled with heart rate of 66.  Patient was seen by cardiology for chest pain and abnormal stress test.  Myoview showed moderate size infarct located in the inferior lateral wall.  Echocardiogram from 829 showed EF of 56 to 60% with moderate mitral valve regurgitation.  Patient underwent cardiac catheterization which showed no evidence of obstructive CAD with microvascular  dysfunction.  Patient tolerating Xarelto and blood pressure medication.  Consideration for CCB or ANGELA in the future if pressure uncontrolled.  Patient to monitor blood pressures twice daily at home.  Patient to follow-up with cardiology in 2 weeks.  Patient follow-up with PCP in 1 week for continued care management.  If symptoms worsen patient to call 911 or go to nearest ED.    Past Medical History:     Past Medical History:   Diagnosis Date    Abnormal ECG     Arrhythmia     Asthma     Atrial fibrillation     Congenital heart disease     Coronary artery disease     Heart murmur     Hyperlipidemia     Hypertension     Sleep apnea     CPap        Past Surgical History:     Past Surgical History:   Procedure Laterality Date    RETINAL DETACHMENT SURGERY      VITRECTOMY PARS PLANA Left 08/11/2020    Procedure: VITRECTOMY PARS PLANA  25GA  WITH ENDOLASER AND  FLUID GAS EXCHANGE LEFT EYE;  Surgeon: Lazarus, Howard S., MD;  Location: [facility];  Service: Ophthalmology;  Laterality: Left;       Social History:   Social History     Socioeconomic History    Marital status:    Tobacco Use    Smoking status: Never     Passive exposure: Never    Smokeless tobacco: Never   Vaping Use    Vaping Use: Never used   Substance and Sexual Activity    Alcohol use: No    Drug use: Never    Sexual activity: Yes     Partners: Female       Procedures Performed    Procedure(s):  Left Heart Cath possible PCI  -------------------       Consults:   Consults       Date and Time Order Name Status Description    9/30/2023 11:15 AM Inpatient Cardiology Consult Completed             Condition on Discharge:     Stable    Discharge Disposition  Home or Self Care    Discharge Medications     Discharge Medications        New Medications        Instructions Start Date   nitroglycerin 0.4 MG SL tablet  Commonly known as: NITROSTAT   0.4 mg, Sublingual, Every 5 Minutes PRN, Take no more than 3 doses in 15 minutes.             Continue These  Medications        Instructions Start Date   atenolol 25 MG tablet  Commonly known as: TENORMIN   25 mg, Oral, Daily      LORazepam 1 MG tablet  Commonly known as: ATIVAN   1 mg, Oral, Every Morning, And if needed at night, but does not take 2nd dose often      rivaroxaban 20 MG tablet  Commonly known as: XARELTO   20 mg, Oral, Daily               Discharge Diet:   Diet Instructions       Diet: Cardiac Diets; Healthy Heart (2-3 Na+); Regular Texture (IDDSI 7); Thin (IDDSI 0)      Discharge Diet: Cardiac Diets    Cardiac Diet: Healthy Heart (2-3 Na+)    Texture: Regular Texture (IDDSI 7)    Fluid Consistency: Thin (IDDSI 0)            Activity at Discharge:   Activity Instructions       Activity as Tolerated      Measure Blood Pressure              Follow-up Appointments  Future Appointments   Date Time Provider Department Center   3/5/2024  3:30 PM Yordy Millan MD [facility]     Additional Instructions for the Follow-ups that You Need to Schedule       Discharge Follow-up with PCP   As directed       Currently Documented PCP:    Rhea Carlson MD    PCP Phone Number:    [phone]     Follow Up Details: 7-10 days                Test Results Pending at Discharge       Risk for Readmission (LACE) Score: 4 (10/1/2023  6:00 AM)          SANDEEP Corral  10/01/23  11:16 EDT        I spent 35 minutes caring for Gamaliel on this date of service. This time includes time spent by me in the following activities: reviewing tests, obtaining and/or reviewing a separately obtained history, performing a medically appropriate examination and/or evaluation, counseling and educating the patient/family/caregiver, ordering medications, tests, or procedures, referring and communicating with other health care professionals, documenting information in the medical record, independently interpreting results and communicating that information with the patient/family/caregiver, and care coordination.

## 2023-10-01 NOTE — PLAN OF CARE
Goal Outcome Evaluation:  Plan of Care Reviewed With: patient        Progress: improving  Outcome Evaluation: pt to d/c home after cardiac cath recovery.

## 2023-10-02 NOTE — CASE MANAGEMENT/SOCIAL WORK
Case Management Discharge Note      Final Note: Routine home         Selected Continued Care - Discharged on 10/1/2023 Admission date: 9/29/2023 - Discharge disposition: Home or Self Care       Transportation Services  Private: Car    Final Discharge Disposition Code: 01 - home or self-care

## 2023-10-05 LAB
QT INTERVAL: 447 MS
QTC INTERVAL: 434 MS

## 2023-10-10 ENCOUNTER — TELEPHONE (OUTPATIENT)
Dept: CARDIOLOGY | Facility: CLINIC | Age: 58
End: 2023-10-10
Payer: COMMERCIAL

## 2023-10-10 NOTE — TELEPHONE ENCOUNTER
Caller: Gamaliel Vogt    Relationship: Self    Best call back number: [phone]     What form or medical record are you requesting: WORK ACCOMODATION     Who is requesting this form or medical record from you: PATIENT AND EMPLOYER    How would you like to receive the form or medical records (pick-up, mail, fax):     Timeframe paperwork needed: ASAP    Additional notes: PT STATES HE DROPPED OFF HIS WORK ACCOMODATION PAPERWORK ON 10.05.2023 AND HE WAS JUST FOLLOWING UP ON IT TO SEE IF IT HAD BEEN COMPLETED OR NOT

## 2023-10-12 NOTE — TELEPHONE ENCOUNTER
Called patient, no answer, left second  message. Per Dr. Millan patient does not need to have any restrictions so we cannot fill out [facility] paperwork.

## 2023-10-16 ENCOUNTER — PATIENT MESSAGE (OUTPATIENT)
Dept: CARDIOLOGY | Facility: CLINIC | Age: 58
End: 2023-10-16
Payer: COMMERCIAL

## 2023-11-30 ENCOUNTER — OFFICE VISIT (OUTPATIENT)
Dept: CARDIOLOGY | Facility: CLINIC | Age: 58
End: 2023-11-30
Payer: COMMERCIAL

## 2023-11-30 VITALS
WEIGHT: 242 LBS | DIASTOLIC BLOOD PRESSURE: 90 MMHG | RESPIRATION RATE: 18 BRPM | BODY MASS INDEX: 34.65 KG/M2 | HEIGHT: 70 IN | HEART RATE: 74 BPM | SYSTOLIC BLOOD PRESSURE: 146 MMHG

## 2023-11-30 DIAGNOSIS — G47.33 OBSTRUCTIVE SLEEP APNEA SYNDROME: ICD-10-CM

## 2023-11-30 DIAGNOSIS — I48.20 ATRIAL FIBRILLATION, CHRONIC: ICD-10-CM

## 2023-11-30 DIAGNOSIS — R10.9 ABDOMINAL PAIN, UNSPECIFIED ABDOMINAL LOCATION: Primary | ICD-10-CM

## 2023-11-30 RX ORDER — BUDESONIDE AND FORMOTEROL FUMARATE DIHYDRATE 160; 4.5 UG/1; UG/1
AEROSOL RESPIRATORY (INHALATION)
COMMUNITY
Start: 2023-11-02

## 2023-11-30 RX ORDER — ALBUTEROL SULFATE 90 UG/1
2 AEROSOL, METERED RESPIRATORY (INHALATION) EVERY 4 HOURS PRN
COMMUNITY

## 2023-11-30 RX ORDER — AMLODIPINE BESYLATE 2.5 MG/1
2.5 TABLET ORAL DAILY
Qty: 30 TABLET | Refills: 11 | Status: SHIPPED | OUTPATIENT
Start: 2023-11-30

## 2023-12-01 RX ORDER — PANTOPRAZOLE SODIUM 40 MG/1
40 TABLET, DELAYED RELEASE ORAL 2 TIMES DAILY
Qty: 60 TABLET | Refills: 3 | Status: SHIPPED | OUTPATIENT
Start: 2023-12-01

## 2023-12-07 NOTE — PROGRESS NOTES
Cardiology Clinic Note  Aki Pitts MD, PhD    Subjective:     Encounter Date:11/30/2023      Patient ID: Gamaliel Vogt is a 58 y.o. male.    Chief Complaint:  Chief Complaint   Patient presents with    Atrial Fibrillation       HPI:    I the pleasure to see this very pleasant 58-year-old gentleman today as a new patient with history of MI in the past, essential hypertension, hyperlipidemia, paroxysmal atrial fibrillation, history of cardiac murmur, last stress test in September 2023 and echo in August 2023.  Has a family history of CAD in his brother with MI and underlying cardiomyopathy.  He is a non-smoker and he is not diabetic.  He had a cardiac catheterization October 2023 secondary to abnormal stress which reported decreased counts of moderate size in the basal inferior and inferolateral wall EF 59%, mild reversibility suggestive of possible ischemia.,  Heart cath revealed nonobstructive atherosclerotic disease with very slow flow possible indicative of microvascular dysfunction.  Coronaries were large with mild ectasia in the proximal RCA, very large caliber circumflex which appeared codominant, LAD had 1 diagonal branch and numerous septal perforators and barely reached the apex with a sizable PDA from the RCA.  There was only mild luminal irregularities less than 20% throughout.  EDP was elevated at 14.  EF 55% with neva-tf-vhar variability with mild concentric LVH, left atrium was severely enlarged with mild to moderate mitral valve regurgitation posteriorly, mild NH, mild TR with normal pulmonary pressures estimated noninvasively, normal aortic valve, likely grade 2 diastolic dysfunction, RV size and function was normal, IVC was normal    His main complaint is some chest discomfort shortness of breath and epigastric pain.  We reviewed his echo as well as his heart cath and demonstrated no obstructive disease in the coronaries to explain discomfort.  We did demonstrate his slow flow which may be  "indicative microvascular function or high LV filling pressures and diastolic dysfunction as well as his atrial fibrillation would be to be variability which was also present.  No explain syncope, lower lower extremity edema or heart failure at this time.  We discussed primary prevention goals for CV comorbidities, diet exercise and weight loss and help with his overall conditioning.  We also discussed referral to electrophysiology for evaluation for restoration of sinus rhythm, evaluation of his underlying rates of atrial fibrillation as well as referral to GI for abdominal pain not explained by cardiac findings    Historical data copied forward from previous encounters in EMR including the history, exam, and assessment/plan has been reviewed and is unchanged unless noted otherwise.    Cardiac medicines reviewed with risk, benefits, and necessity of each discussed.    Risk and benefit of cardiac testing reviewed including death heart attack stroke pain bleeding infection need for vascular /cardiovascular surgery were discussed and the patient     Objective:         /90 (BP Location: Left arm, Patient Position: Sitting)   Pulse 74   Resp 18   Ht 177.8 cm (70\")   Wt 110 kg (242 lb)   BMI 34.72 kg/m²     Physical Exam  Irregularly irregular, no rubs gallops heave or lift, 2 out of 6 systolic murmur at the apex consistent with MR  No clubbing cyanosis, trace edema only  Normal pulses normal cap refill  Skin is warm and dry  Normocephalic/atraumatic pupils equal round  No carotid bruits  Intact grossly  Obese soft nontender nondistended  Assessment:         Diagnoses and all orders for this visit:    1. Abdominal pain, unspecified abdominal location (Primary)  -     CT Abdomen With Contrast; Future  -     Ambulatory Referral to Gastroenterology    2. Atrial fibrillation, chronic  -     Mobile Cardiac Outpatient Telemetry; Future  -     Ambulatory Referral to Sleep Medicine  -     Ambulatory Referral to Cardiac " Electrophysiology    3. Obstructive sleep apnea syndrome  -     Ambulatory Referral to Sleep Medicine    Other orders  -     amLODIPine (NORVASC) 2.5 MG tablet; Take 1 tablet by mouth Daily.  Dispense: 30 tablet; Refill: 11  -     pantoprazole (PROTONIX) 40 MG EC tablet; Take 1 tablet by mouth 2 (Two) Times a Day.  Dispense: 60 tablet; Refill: 3      Essential hypertension  Hyperlipidemia  Likely chronic persistent atrial fibrillation  Nonobstructive CAD  Family history of CAD  Obesity BMI greater than 34  Shortness of breath and dyspnea on exertion  Epigastric pain    Continue atenolol, amlodipine, continue Xarelto for stroke risk reduction [id number] score 3    Heart monitor for 2 to 4 weeks, referral to EP for evaluation for any restoration of sinus rhythm possible, he already has moderate to severe left atrial enlargement, may not have ability for longstanding maintenance of sinus rhythm but has not tried antiarrhythmics or ablation at this time    Add amlodipine 2.5 daily    Sleep medicine referral    With abdominal pain refer to GI, start pantoprazole 40 twice daily for 2 weeks then 40 daily, CT of the abdomen with IV p.o. contrast with abdominal pain not explained by cardiac findings    Back to clinic in 30 days    If you Hong MONTAÑO PhD     Plan:              The pleasure to be involved in this patient's cardiovascular care.  Please call with any questions or concerns  Aki Pitts MD, PhD    Most recent EKG as reviewed and interpreted by me:  Procedures     Most recent echo as reviewed and interpreted by me:  Results for orders placed during the hospital encounter of 08/29/23    Adult Transthoracic Echo Complete W/ Cont if Necessary Per Protocol    Interpretation Summary    Left ventricular ejection fraction appears to be 56 - 60%.    The left atrial cavity is moderately dilated.      Most recent stress test as reviewed and interpreted by me:  Results for orders placed during the hospital encounter of  09/29/23    Stress Test With Myocardial Perfusion One Day    Interpretation Summary    Left ventricular ejection fraction is normal (Calculated EF = 59%).    Myocardial perfusion imaging indicates a moderate-sized infarct located in the basal inferior lateral wall with mild dickson-infarct ischemia.    Impressions are consistent with an intermediate risk study.    Findings consistent with a normal ECG stress test.      Most recent cardiac catheterization as reviewed interpreted by me:  Results for orders placed during the hospital encounter of 09/29/23    Cardiac Catheterization/Vascular Study    Narrative  OPERATORS:  1.  Gemini Dyer M.D., Attending Cardiologist      PROCEDURE PERFORMED.  Ultrasound guided vascular access  Left heart catheterization  Coronary Angiogram 09186  Moderate Sedation    INDICATIONS FOR PROCEDURE.  Chest pain with positive stress test    PROCEDURE IN DETAIL.  Informed consent was obtained from the patient after explaining the risks, benefits, and alternative options of the procedure. After obtaining informed consent, the patient was brought to the cath lab and was prepped in a sterile fashion. Lidocaine 1% was used for local anesthesia into the right radial access site. The right radial artery was accessed under direct ultrasound visualization  with an angiocath needle via modified Seldinger technique. A 6F slender sheath was inserted successfully. Afterwards, 6F JR4  was advanced over a wire into the ascending aorta and used to cross the AV and obtain LV pressures.  AV gradient obtained via pullback technique. JR4 and JL3.5 diagnostic catheters were used to engage the ostia of the RCA and LM respectively. Images of the right and left coronary systems were obtained. All the catheters were exchanged over a wire and subsequently removed. The patient tolerated the procedure well without any complications. The pictures were reviewed at the end of the procedure. TR band applied to right wrist  for hemostasis and inflated with 15 cc of air. No complications were encountered.    HEMODYNAMICS.  LV: 107/10/14  AO: 104/82/92  No significant gradient across the aortic valve during pullback of JR4 catheter.  LV gram was not performed due to recent echocardiogram.    FINDINGS.    Coronary Angiogram.    1. Left main. Left main is a large-caliber vessel which gives rise to the Left Anterior Descending and the Left circumflex.  Left main is angiographically free from any significant disease    2. Left Anterior Descending Artery. LAD is a large vessel which gives rise to several septal perforators and several diagonal branches. It is angiographically free from any significant disease.  There is slow flow in the LAD    3. Left Circumflex. The LCx is a large-caliber codominant vessel which gives rise to marginals and actually supplies a part of the apex.  Left circumflex artery is angiographically free from any significant disease. there is slow flow in the left circumflex.    4. Right Coronary Artery. The RCA is a large-caliber Co-dominant vessel which gives rise to several small caliber branches including PDA. Right coronary artery is angiographically free from any significant disease.  There is slow flow in the RCA.    IMPRESSIONS.  No evidence of obstructive CAD  Large caliber vessels.  Slow flow in coronary arteries  Normal LVEDP    RECOMMENDATIONS.  1. Continue aggressive risk factor modification for primary prevention.  2. Exercise and lifestyle modifications recommended.  Can consider calcium channel blockers or long-acting nitrates if he continues to have chest pain given slow flow in his coronary arteries    The following portions of the patient's history were reviewed and updated as appropriate: allergies, current medications, past family history, past medical history, past social history, past surgical history, and problem list.      ROS:  14 point review of systems negative except as mentioned  above    Current Outpatient Medications:     albuterol sulfate  (90 Base) MCG/ACT inhaler, Inhale 2 puffs Every 4 (Four) Hours As Needed for Wheezing., Disp: , Rfl:     atenolol (TENORMIN) 25 MG tablet, Take 1 tablet by mouth Daily., Disp: , Rfl:     budesonide-formoterol (SYMBICORT) 160-4.5 MCG/ACT inhaler, 2 (Two) Times a Day., Disp: , Rfl:     LORazepam (ATIVAN) 1 MG tablet, Take 1 tablet by mouth Every Morning. And if needed at night, but does not take 2nd dose often, Disp: , Rfl:     nitroglycerin (NITROSTAT) 0.4 MG SL tablet, Place 1 tablet under the tongue Every 5 (Five) Minutes As Needed for Chest Pain (Only if SBP Greater Than 100). Take no more than 3 doses in 15 minutes., Disp: 30 tablet, Rfl: 12    rivaroxaban (XARELTO) 20 MG tablet, Take 1 tablet by mouth Daily., Disp: , Rfl:     amLODIPine (NORVASC) 2.5 MG tablet, Take 1 tablet by mouth Daily., Disp: 30 tablet, Rfl: 11    pantoprazole (PROTONIX) 40 MG EC tablet, Take 1 tablet by mouth 2 (Two) Times a Day., Disp: 60 tablet, Rfl: 3    Problem List:  Patient Active Problem List   Diagnosis    Sleep apnea    Primary idiopathic hypertrophic cardiomyopathy    Long term current use of anticoagulant    Hypertension    Atrial fibrillation, chronic    Chest pain    Abnormal nuclear stress test     Past Medical History:  Past Medical History:   Diagnosis Date    Abnormal ECG     Arrhythmia     Asthma     Atrial fibrillation     Congenital heart disease     Coronary artery disease     Heart murmur     Hyperlipidemia     Hypertension     Sleep apnea     CPap      Past Surgical History:  Past Surgical History:   Procedure Laterality Date    CARDIAC CATHETERIZATION Left 10/01/2023    Procedure: Left Heart Cath possible PCI;  Surgeon: Gemini Dyer MD;  Location: [facility] CATH INVASIVE LOCATION;  Service: Cardiology;  Laterality: Left;    CARDIAC CATHETERIZATION  10/1/2023    RETINAL DETACHMENT SURGERY      VITRECTOMY PARS PLANA Left 08/11/2020    Procedure:  VITRECTOMY PARS PLANA  25GA  WITH ENDOLASER AND  FLUID GAS EXCHANGE LEFT EYE;  Surgeon: Lazarus, Howard S., MD;  Location: [facility] MAIN OR;  Service: Ophthalmology;  Laterality: Left;     Social History:  Social History     Socioeconomic History    Marital status:    Tobacco Use    Smoking status: Never     Passive exposure: Never    Smokeless tobacco: Never   Vaping Use    Vaping Use: Never used   Substance and Sexual Activity    Alcohol use: No    Drug use: Never    Sexual activity: Yes     Partners: Female     Birth control/protection: None     Allergies:  No Known Allergies  Immunizations:    There is no immunization history on file for this patient.         In-Office Procedure(s):  No orders to display        ASCVD RIsk Score::  The ASCVD Risk score (Grant [id number], et al., 2019) failed to calculate for the following reasons:    The patient has a prior MI or stroke diagnosis    Imaging:    Results for orders placed during the hospital encounter of 09/29/23    XR Chest 1 View    Narrative  XR CHEST 1 VW    Date of Exam: 9/29/2023 3:07 PM EDT    Indication: chest pain    Comparison: None available.    FINDINGS:  No consolidations or pleural effusions are observed. The cardiac silhouette is within normal limits for size. The mediastinum is unremarkable. No acute osseous abnormalities are identified on this single view.    Impression  1.No evidence for acute cardiopulmonary process.    Electronically Signed: Kevin Fuller MD  9/29/2023 3:09 PM EDT  Workstation ID: [id number]               Lab Review:   Admission on 09/29/2023, Discharged on 10/01/2023   Component Date Value    QT Interval 09/29/2023 407     QTC Interval 09/29/2023 427     Glucose 09/29/2023 81     BUN 09/29/2023 16     Creatinine 09/29/2023 1.11     Sodium 09/29/2023 142     Potassium 09/29/2023 4.5     Chloride 09/29/2023 102     CO2 09/29/2023 30.0 (H)     Calcium 09/29/2023 10.5     Total Protein 09/29/2023 7.6     Albumin 09/29/2023 4.9      ALT (SGPT) 09/29/2023 26     AST (SGOT) 09/29/2023 31     Alkaline Phosphatase 09/29/2023 70     Total Bilirubin 09/29/2023 1.3 (H)     Globulin 09/29/2023 2.7     A/G Ratio 09/29/2023 1.8     BUN/Creatinine Ratio 09/29/2023 14.4     Anion Gap 09/29/2023 10.0     eGFR 09/29/2023 76.5     Protime 09/29/2023 11.9 (H)     INR 09/29/2023 1.10     PTT 09/29/2023 32.1 (L)     HS Troponin T 09/29/2023 21 (H)     WBC 09/29/2023 7.50     RBC 09/29/2023 5.11     Hemoglobin 09/29/2023 15.9     Hematocrit 09/29/2023 47.8     MCV 09/29/2023 93.5     MCH 09/29/2023 31.0     MCHC 09/29/2023 33.2     RDW 09/29/2023 13.3     RDW-SD 09/29/2023 42.9     MPV 09/29/2023 10.1     Platelets 09/29/2023 216     Neutrophil % 09/29/2023 56.8     Lymphocyte % 09/29/2023 28.0     Monocyte % 09/29/2023 10.3     Eosinophil % 09/29/2023 4.6     Basophil % 09/29/2023 0.3     Neutrophils, Absolute 09/29/2023 4.30     Lymphocytes, Absolute 09/29/2023 2.10     Monocytes, Absolute 09/29/2023 0.80     Eosinophils, Absolute 09/29/2023 0.30     Basophils, Absolute 09/29/2023 0.00     nRBC 09/29/2023 0.1     HS Troponin T 09/29/2023 20 (H)     Troponin T Delta 09/29/2023 -1     Uric Acid 09/29/2023 7.3 (H)     Hemoglobin A1C 09/29/2023 5.60     Total Cholesterol 09/29/2023 177     Triglycerides 09/29/2023 92     HDL Cholesterol 09/29/2023 48     LDL Cholesterol  09/29/2023 112 (H)     VLDL Cholesterol 09/29/2023 17     LDL/HDL Ratio 09/29/2023 2.30     proBNP 09/29/2023 962.6 (H)     TSH 09/29/2023 2.310     Free T4 09/29/2023 0.80 (L)     BH CV STRESS PROTOCOL 1 09/30/2023 Pharmacologic     Stage 1 09/30/2023 1.0     HR Stage 1 09/30/2023 65     BP Stage 1 09/30/2023 116/78     Duration Min Stage 1 09/30/2023 0     Duration Sec Stage 1 09/30/2023 10     Stress Dose Regadenoson * 09/30/2023 0.40     Stress Comments Stage 1 09/30/2023 10 sec bolus injection     Stage 2 09/30/2023 2.0     HR Stage 2 09/30/2023 87     BP Stage 2 09/30/2023 116/78     Duration  Min Stage 2 09/30/2023 4     Duration Sec Stage 2 09/30/2023 0     Stress Comments Stage 2 09/30/2023 recovery     Stage 3 09/30/2023 3.0     HR Stage 3 09/30/2023 71     BP Stage 3 09/30/2023 116/78     Stage 4 09/30/2023 4.0     HR Stage 4 09/30/2023 64     BP Stage 4 09/30/2023 116/78     Baseline HR 09/30/2023 58     Baseline BP 09/30/2023 116/78     Peak HR 09/30/2023 87     Peak BP 09/30/2023 116/78     Recovery HR 09/30/2023 75     Recovery BP 09/30/2023 109/72     Target HR (85%) 09/30/2023 137     Max. Pred. HR (100%) 09/30/2023 161     Percent Max Pred HR 09/30/2023 54.04     Percent Target HR 09/30/2023 64     BH CV REST NUCLEAR ISOTO* 09/30/2023 10.7     BH CV STRESS NUCLEAR ISO* 09/30/2023 33.0     Nuc Stress EF 09/30/2023 59     Glucose 09/30/2023 100 (H)     BUN 09/30/2023 18     Creatinine 09/30/2023 1.10     Sodium 09/30/2023 135 (L)     Potassium 09/30/2023 3.6     Chloride 09/30/2023 100     CO2 09/30/2023 28.0     Calcium 09/30/2023 9.2     BUN/Creatinine Ratio 09/30/2023 16.4     Anion Gap 09/30/2023 7.0     eGFR 09/30/2023 77.3     WBC 09/30/2023 8.50     RBC 09/30/2023 4.76     Hemoglobin 09/30/2023 14.6     Hematocrit 09/30/2023 44.4     MCV 09/30/2023 93.3     MCH 09/30/2023 30.7     MCHC 09/30/2023 32.9     RDW 09/30/2023 13.5     RDW-SD 09/30/2023 43.3     MPV 09/30/2023 10.1     Platelets 09/30/2023 197     Neutrophil % 09/30/2023 61.0     Lymphocyte % 09/30/2023 22.2     Monocyte % 09/30/2023 10.2     Eosinophil % 09/30/2023 5.6     Basophil % 09/30/2023 1.0     Neutrophils, Absolute 09/30/2023 5.20     Lymphocytes, Absolute 09/30/2023 1.90     Monocytes, Absolute 09/30/2023 0.90     Eosinophils, Absolute 09/30/2023 0.50 (H)     Basophils, Absolute 09/30/2023 0.10     nRBC 09/30/2023 0.0     Glucose 10/01/2023 106 (H)     BUN 10/01/2023 16     Creatinine 10/01/2023 1.00     Sodium 10/01/2023 137     Potassium 10/01/2023 3.8     Chloride 10/01/2023 102     CO2 10/01/2023 24.0     Calcium  10/01/2023 9.0     BUN/Creatinine Ratio 10/01/2023 16.0     Anion Gap 10/01/2023 11.0     eGFR 10/01/2023 86.7     WBC 10/01/2023 9.20     RBC 10/01/2023 5.13     Hemoglobin 10/01/2023 15.6     Hematocrit 10/01/2023 47.6     MCV 10/01/2023 92.8     MCH 10/01/2023 30.4     MCHC 10/01/2023 32.7     RDW 10/01/2023 13.4     RDW-SD 10/01/2023 43.3     MPV 10/01/2023 10.1     Platelets 10/01/2023 196     Neutrophil % 10/01/2023 62.5     Lymphocyte % 10/01/2023 21.6     Monocyte % 10/01/2023 10.6     Eosinophil % 10/01/2023 4.3     Basophil % 10/01/2023 1.0     Neutrophils, Absolute 10/01/2023 5.70     Lymphocytes, Absolute 10/01/2023 2.00     Monocytes, Absolute 10/01/2023 1.00 (H)     Eosinophils, Absolute 10/01/2023 0.40     Basophils, Absolute 10/01/2023 0.10     nRBC 10/01/2023 0.0     QT Interval 10/01/2023 447     QTC Interval 10/01/2023 434    Hospital Outpatient Visit on 08/29/2023   Component Date Value    LVIDd 08/29/2023 4.6     LVIDs 08/29/2023 3.4     IVSd 08/29/2023 1.20     LVPWd 08/29/2023 1.21     FS 08/29/2023 24.9     IVS/LVPW 08/29/2023 0.99     ESV(cubed) 08/29/2023 40.6     EDV(cubed) 08/29/2023 96.1     LVOT area 08/29/2023 3.2     LV mass(C)d 08/29/2023 205.2     LVOT diam 08/29/2023 2.03     EDV(MOD-sp4) 08/29/2023 78.4     ESV(MOD-sp4) 08/29/2023 34.3     SV(MOD-sp4) 08/29/2023 44.2     EF(MOD-sp4) 08/29/2023 56.3     MV E max mckinley 08/29/2023 86.4     SV(LVOT) 08/29/2023 61.6     LV V1 max 08/29/2023 101.6     LV V1 max PG 08/29/2023 4.1     LV V1 mean PG 08/29/2023 2.22     LV V1 VTI 08/29/2023 19.0     Ao pk mckinley 08/29/2023 138.6     Ao max PG 08/29/2023 7.7     Ao mean PG 08/29/2023 4.0     Ao V2 VTI 08/29/2023 28.5     SHELL(I,D) 08/29/2023 2.16     MV max PG 08/29/2023 4.1     MV mean PG 08/29/2023 1.89     MV V2 VTI 08/29/2023 22.5     MVA(VTI) 08/29/2023 2.7     MR max mckinley 08/29/2023 487.9     MR max PG 08/29/2023 95.3     TR max mckinley 08/29/2023 221.0     TR max PG 08/29/2023 20.1     RV V1 max  PG 08/29/2023 1.05     RV V1 max 08/29/2023 51.2     RV V1 VTI 08/29/2023 11.6     PA V2 max 08/29/2023 105.1     PI end-d mckinley 08/29/2023 118.4     Ao root diam 08/29/2023 3.2     ACS 08/29/2023 1.99     EF(MOD-bp) 08/29/2023 56.0     LA dimension (2D)  08/29/2023 4.9      Recent labs reviewed and interpreted for clinical significance and application            Level of Care:           Aki Pitts MD  12/07/23  .

## 2023-12-08 ENCOUNTER — TELEPHONE (OUTPATIENT)
Dept: CARDIOLOGY | Facility: CLINIC | Age: 58
End: 2023-12-08
Payer: COMMERCIAL

## 2023-12-08 NOTE — TELEPHONE ENCOUNTER
Caller: Gamaliel Vogt    Relationship: Self    Best call back number: [phone]    What is the best time to reach you: ANY    Who are you requesting to speak with (clinical staff, provider,  specific staff member): ANY      What was the call regarding: PATIENT HAS AN APPT WITH A GI AND NEEDS TO MAKE SUCH HER HAS APPROVAL AND REFERALL FROM US TO THE GI DR. PLEASE LET HIM KNOW IF IT ALL GOT SENT OVER. DR RISA WILEY AT [address]  # [phone].

## 2023-12-11 ENCOUNTER — HOSPITAL ENCOUNTER (OUTPATIENT)
Dept: CARDIOLOGY | Facility: HOSPITAL | Age: 58
Discharge: HOME | End: 2023-12-11
Payer: COMMERCIAL

## 2023-12-11 ENCOUNTER — HOSPITAL ENCOUNTER (OUTPATIENT)
Dept: CT IMAGING | Facility: HOSPITAL | Age: 58
Discharge: HOME | End: 2023-12-11
Payer: COMMERCIAL

## 2023-12-11 DIAGNOSIS — R10.9 ABDOMINAL PAIN, UNSPECIFIED ABDOMINAL LOCATION: ICD-10-CM

## 2023-12-11 DIAGNOSIS — I48.20 ATRIAL FIBRILLATION, CHRONIC: ICD-10-CM

## 2023-12-11 LAB
CREAT BLDA-MCNC: 1.2 MG/DL (ref 0.6–1.3)
EGFRCR SERPLBLD CKD-EPI 2021: 69.7 ML/MIN/1.73

## 2023-12-11 PROCEDURE — 74160 CT ABDOMEN W/CONTRAST: CPT

## 2023-12-11 PROCEDURE — 82565 ASSAY OF CREATININE: CPT

## 2023-12-11 PROCEDURE — 25510000001 IOPAMIDOL PER 1 ML: Performed by: INTERNAL MEDICINE

## 2023-12-11 RX ADMIN — IOPAMIDOL 100 ML: 755 INJECTION, SOLUTION INTRAVENOUS at 14:35

## 2023-12-18 ENCOUNTER — OFFICE (OUTPATIENT)
Dept: URBAN - METROPOLITAN AREA CLINIC 64 | Facility: CLINIC | Age: 58
End: 2023-12-18

## 2023-12-18 VITALS
HEIGHT: 70 IN | HEART RATE: 56 BPM | WEIGHT: 241 LBS | DIASTOLIC BLOOD PRESSURE: 90 MMHG | SYSTOLIC BLOOD PRESSURE: 132 MMHG

## 2023-12-18 DIAGNOSIS — K21.9 GASTRO-ESOPHAGEAL REFLUX DISEASE WITHOUT ESOPHAGITIS: ICD-10-CM

## 2023-12-18 DIAGNOSIS — I48.91 UNSPECIFIED ATRIAL FIBRILLATION: ICD-10-CM

## 2023-12-18 DIAGNOSIS — R13.10 DYSPHAGIA, UNSPECIFIED: ICD-10-CM

## 2023-12-18 DIAGNOSIS — Z80.0 FAMILY HISTORY OF MALIGNANT NEOPLASM OF DIGESTIVE ORGANS: ICD-10-CM

## 2023-12-18 DIAGNOSIS — Z92.29 PERSONAL HISTORY OF OTHER DRUG THERAPY: ICD-10-CM

## 2023-12-18 DIAGNOSIS — R10.12 LEFT UPPER QUADRANT PAIN: ICD-10-CM

## 2023-12-18 PROCEDURE — 99204 OFFICE O/P NEW MOD 45 MIN: CPT | Performed by: INTERNAL MEDICINE

## 2023-12-19 ENCOUNTER — OFFICE VISIT (OUTPATIENT)
Dept: CARDIOLOGY | Facility: CLINIC | Age: 58
End: 2023-12-19
Payer: COMMERCIAL

## 2023-12-19 ENCOUNTER — PREP FOR SURGERY (OUTPATIENT)
Dept: OTHER | Facility: HOSPITAL | Age: 58
End: 2023-12-19
Payer: COMMERCIAL

## 2023-12-19 VITALS
DIASTOLIC BLOOD PRESSURE: 77 MMHG | SYSTOLIC BLOOD PRESSURE: 137 MMHG | WEIGHT: 240.2 LBS | OXYGEN SATURATION: 99 % | BODY MASS INDEX: 34.39 KG/M2 | HEART RATE: 68 BPM | HEIGHT: 70 IN

## 2023-12-19 DIAGNOSIS — Z79.01 LONG TERM CURRENT USE OF ANTICOAGULANT: ICD-10-CM

## 2023-12-19 DIAGNOSIS — I48.20 ATRIAL FIBRILLATION, CHRONIC: Primary | ICD-10-CM

## 2023-12-19 DIAGNOSIS — I48.19 PERSISTENT ATRIAL FIBRILLATION: Primary | ICD-10-CM

## 2023-12-19 DIAGNOSIS — I10 PRIMARY HYPERTENSION: ICD-10-CM

## 2023-12-19 RX ORDER — SUCRALFATE 1 G/1
120 TABLET ORAL
COMMUNITY

## 2023-12-19 RX ORDER — DOFETILIDE 0.5 MG/1
500 CAPSULE ORAL EVERY 12 HOURS
OUTPATIENT
Start: 2023-12-19

## 2023-12-19 RX ORDER — NITROGLYCERIN 0.4 MG/1
0.4 TABLET SUBLINGUAL
OUTPATIENT
Start: 2023-12-19

## 2023-12-19 NOTE — PROGRESS NOTES
HP      Name: Gamaliel Vogt ADMIT: (Not on file)   : 1965  PCP: Rhea Carlson MD    MRN: [id number] LOS: 0 days   AGE/SEX: 58 y.o. male  ROOM: Room/bed info not found     No chief complaint on file.      Subjective        History of present illness  Gamaliel Vogt is a 58-year-old male patient who has hypertension, no significant CAD on heart cath in 2023, obstructive sleep apnea uses CPAP, is here today to discuss about atrial fibrillation.  Patient has had atrial fibrillation since around at least .  He has had a cardioversion in 2016 but unfortunately he reverted back into A-fib soon after.  It seems that he has been in A-fib since then.          Past Medical History:   Diagnosis Date    Abnormal ECG     Arrhythmia     Asthma     Atrial fibrillation     Congenital heart disease     Coronary artery disease     Heart murmur     Hyperlipidemia     Hypertension     Sleep apnea     CPap      Past Surgical History:   Procedure Laterality Date    CARDIAC CATHETERIZATION Left 10/01/2023    Procedure: Left Heart Cath possible PCI;  Surgeon: Gemini Dyer MD;  Location: [facility] CATH INVASIVE LOCATION;  Service: Cardiology;  Laterality: Left;    CARDIAC CATHETERIZATION  10/1/2023    RETINAL DETACHMENT SURGERY      VITRECTOMY PARS PLANA Left 2020    Procedure: VITRECTOMY PARS PLANA  25GA  WITH ENDOLASER AND  FLUID GAS EXCHANGE LEFT EYE;  Surgeon: Lazarus, Howard S., MD;  Location: [facility] OR;  Service: Ophthalmology;  Laterality: Left;     Family History   Problem Relation Age of Onset    Asthma Mother     Heart disease Mother     No Known Problems Father     No Known Problems Sister     Arrhythmia Brother     Heart attack Brother     Heart disease Brother     No Known Problems Maternal Aunt     No Known Problems Maternal Uncle     No Known Problems Paternal Aunt     No Known Problems Paternal Uncle     No Known Problems Maternal Grandmother     No Known Problems Maternal Grandfather      No Known Problems Paternal Grandmother     No Known Problems Paternal Grandfather     No Known Problems Other     Anemia Neg Hx     Clotting disorder Neg Hx     Fainting Neg Hx     Heart failure Neg Hx     Hyperlipidemia Neg Hx     Hypertension Neg Hx      Social History     Tobacco Use    Smoking status: Never     Passive exposure: Never    Smokeless tobacco: Never   Vaping Use    Vaping Use: Never used   Substance Use Topics    Alcohol use: No    Drug use: Never     (Not in a hospital admission)    Allergies:  Patient has no known allergies.    Review of systems    Constitutional: Negative.    Respiratory and cardiovascular: As detailed in HPI section.  Gastrointestinal: Negative for constipation, nausea and vomiting negative for abdominal distention, abdominal pain and diarrhea.   Genitourinary: Negative for difficulty urinating and flank pain.   Musculoskeletal: Negative for arthralgias, joint swelling and myalgias.   Skin: Negative for color change, rash and wound.   Neurological: Negative for dizziness, syncope, weakness and headaches.   Hematological: Negative for adenopathy.   Psychiatric/Behavioral: Negative for confusion.   All other systems reviewed and are negative.    Physical Exam  VITALS REVIEWED    General:      well developed, in no acute distress.    Head:      normocephalic and atraumatic.    Eyes:      PERRL/EOM intact, conjunctiva and sclera clear with out nystagmus.    Neck:      no masses, thyromegaly,  trachea central with normal respiratory effort and PMI displaced laterally  Lungs:      Clear to auscultation bilaterally  Heart:       Irregular rhythm  Msk:      no deformity or scoliosis noted of thoracic or lumbar spine.    Pulses:      pulses normal in all 4 extremities.    Extremities:       No lower extremity edema  Neurologic:      no focal deficits.   alert oriented x3  Skin:      intact without lesions or rashes.    Psych:      alert and cooperative; normal mood and affect; normal  attention span and concentration.      Result Review :               Pertinent cardiac workup    Heart cath 10/1/2023 no significant CAD  EKG 10/1/2023 atrial fibrillation  EKG November 6, 2019 atrial fibrillation    Procedures        Assessment and Plan      Gamaliel Vogt is a 58-year-old male patient who has atrial fibrillation, based on chart review, A-fib seems to be chronic.  He had records with him from 2015, at that time EKG which I personally reviewed also showed atrial fibrillation.  He did have a cardioversion in 2016 but did not stay in sinus rhythm for long.  Normally rhythm control becomes very challenging in chronic A-fib situations, however patient is interested in trying to revert sinus rhythm.  I went over the available options for him including ablation versus trial of antiarrhythmic with cardioversion.  I personally think that antiarrhythmic and cardioversion is a good way to start in his case, because if it is successful then that could probably indicate that ablation in the future might be helpful, but on the other hand if despite antiarrhythmics, we cannot convert him to sinus rhythm or maintain sinus rhythm for any length of time, then probably ablation would not be helpful either.  Both of these options were discussed with the patient, and he would like to try antiarrhythmic and cardioversion first.  As far as choice of antiarrhythmic, in his case I think that the best option is to admit him to the hospital for Tikosyn loading followed by cardioversion after the second or third dose.  Patient is agreeable with the plan.  As far as stroke prevention he is on Xarelto without any bleeding issues.    Diagnoses and all orders for this visit:    1. Atrial fibrillation, chronic (Primary)    2. Primary hypertension    3. Long term current use of anticoagulant           No follow-ups on file.  Patient was given instructions and counseling regarding his condition or for health maintenance advice.  Please see specific information pulled into the AVS if appropriate.

## 2023-12-20 ENCOUNTER — TELEPHONE (OUTPATIENT)
Dept: CARDIOLOGY | Facility: CLINIC | Age: 58
End: 2023-12-20
Payer: COMMERCIAL

## 2023-12-20 NOTE — TELEPHONE ENCOUNTER
Faxed to be placed in chart   Cephalexin Pregnancy And Lactation Text: This medication is Pregnancy Category B and considered safe during pregnancy.  It is also excreted in breast milk but can be used safely for shorter doses.

## 2023-12-20 NOTE — TELEPHONE ENCOUNTER
Called Pt to get Dr Erickson fax number there is a clearance he brought in but no paperwork on the surgery or type. Pt provided me with # [phone] Dr. Martinez's office. Fax number is [phone] the patient is having a colonoscopy. TBD on schedule date.

## 2023-12-21 ENCOUNTER — TELEPHONE (OUTPATIENT)
Dept: CARDIOLOGY | Facility: CLINIC | Age: 58
End: 2023-12-21
Payer: COMMERCIAL

## 2023-12-21 NOTE — TELEPHONE ENCOUNTER
Called bed control and [facility] per Dr. HERNANDEZ's request. Called patient and informed him of process and JUANA Murphy is aware as well.

## 2023-12-26 ENCOUNTER — HOSPITAL ENCOUNTER (INPATIENT)
Facility: HOSPITAL | Age: 58
LOS: 3 days | Discharge: HOME | DRG: 305 | End: 2023-12-29
Attending: INTERNAL MEDICINE | Admitting: INTERNAL MEDICINE
Payer: COMMERCIAL

## 2023-12-26 DIAGNOSIS — I48.19 PERSISTENT ATRIAL FIBRILLATION: ICD-10-CM

## 2023-12-26 DIAGNOSIS — Z12.11 SCREENING FOR MALIGNANT NEOPLASM OF COLON: Primary | ICD-10-CM

## 2023-12-26 DIAGNOSIS — R10.13 DYSPEPSIA: ICD-10-CM

## 2023-12-26 DIAGNOSIS — R10.13 EPIGASTRIC PAIN: ICD-10-CM

## 2023-12-26 DIAGNOSIS — R13.10 DYSPHAGIA, UNSPECIFIED TYPE: ICD-10-CM

## 2023-12-26 LAB
ANION GAP SERPL CALCULATED.3IONS-SCNC: 12 MMOL/L (ref 5–15)
BUN SERPL-MCNC: 12 MG/DL (ref 6–20)
BUN/CREAT SERPL: 13.3 (ref 7–25)
CALCIUM SPEC-SCNC: 9.5 MG/DL (ref 8.6–10.5)
CHLORIDE SERPL-SCNC: 104 MMOL/L (ref 98–107)
CO2 SERPL-SCNC: 25 MMOL/L (ref 22–29)
CREAT SERPL-MCNC: 0.9 MG/DL (ref 0.76–1.27)
EGFRCR SERPLBLD CKD-EPI 2021: 98.4 ML/MIN/1.73
GLUCOSE SERPL-MCNC: 125 MG/DL (ref 65–99)
MAGNESIUM SERPL-MCNC: 2.1 MG/DL (ref 1.6–2.6)
POTASSIUM SERPL-SCNC: 4.2 MMOL/L (ref 3.5–5.2)
QT INTERVAL: 410 MS
QT INTERVAL: 429 MS
QTC INTERVAL: 412 MS
QTC INTERVAL: 462 MS
SODIUM SERPL-SCNC: 141 MMOL/L (ref 136–145)

## 2023-12-26 PROCEDURE — 93010 ELECTROCARDIOGRAM REPORT: CPT | Performed by: INTERNAL MEDICINE

## 2023-12-26 PROCEDURE — 94640 AIRWAY INHALATION TREATMENT: CPT

## 2023-12-26 PROCEDURE — 83735 ASSAY OF MAGNESIUM: CPT | Performed by: INTERNAL MEDICINE

## 2023-12-26 PROCEDURE — 94799 UNLISTED PULMONARY SVC/PX: CPT

## 2023-12-26 PROCEDURE — 80048 BASIC METABOLIC PNL TOTAL CA: CPT | Performed by: INTERNAL MEDICINE

## 2023-12-26 PROCEDURE — 93005 ELECTROCARDIOGRAM TRACING: CPT | Performed by: INTERNAL MEDICINE

## 2023-12-26 RX ORDER — AMLODIPINE BESYLATE 5 MG/1
2.5 TABLET ORAL DAILY
Status: DISCONTINUED | OUTPATIENT
Start: 2023-12-26 | End: 2023-12-27

## 2023-12-26 RX ORDER — LORAZEPAM 1 MG/1
1 TABLET ORAL EVERY MORNING
Status: DISCONTINUED | OUTPATIENT
Start: 2023-12-27 | End: 2023-12-29 | Stop reason: HOSPADM

## 2023-12-26 RX ORDER — PANTOPRAZOLE SODIUM 40 MG/1
40 TABLET, DELAYED RELEASE ORAL 2 TIMES DAILY
Status: DISCONTINUED | OUTPATIENT
Start: 2023-12-26 | End: 2023-12-29 | Stop reason: HOSPADM

## 2023-12-26 RX ORDER — NITROGLYCERIN 0.4 MG/1
0.4 TABLET SUBLINGUAL
Status: DISCONTINUED | OUTPATIENT
Start: 2023-12-26 | End: 2023-12-29 | Stop reason: HOSPADM

## 2023-12-26 RX ORDER — DOFETILIDE 0.25 MG/1
500 CAPSULE ORAL EVERY 12 HOURS SCHEDULED
Status: DISCONTINUED | OUTPATIENT
Start: 2023-12-26 | End: 2023-12-29 | Stop reason: HOSPADM

## 2023-12-26 RX ORDER — ALBUTEROL SULFATE 2.5 MG/3ML
2.5 SOLUTION RESPIRATORY (INHALATION) EVERY 6 HOURS PRN
Status: DISCONTINUED | OUTPATIENT
Start: 2023-12-26 | End: 2023-12-29 | Stop reason: HOSPADM

## 2023-12-26 RX ORDER — ZOLPIDEM TARTRATE 5 MG/1
10 TABLET ORAL NIGHTLY PRN
Status: DISCONTINUED | OUTPATIENT
Start: 2023-12-26 | End: 2023-12-29 | Stop reason: HOSPADM

## 2023-12-26 RX ORDER — BUDESONIDE AND FORMOTEROL FUMARATE DIHYDRATE 160; 4.5 UG/1; UG/1
2 AEROSOL RESPIRATORY (INHALATION)
Status: DISCONTINUED | OUTPATIENT
Start: 2023-12-26 | End: 2023-12-29 | Stop reason: HOSPADM

## 2023-12-26 RX ADMIN — ZOLPIDEM TARTRATE 10 MG: 5 TABLET ORAL at 21:23

## 2023-12-26 RX ADMIN — BUDESONIDE AND FORMOTEROL FUMARATE DIHYDRATE 2 PUFF: 160; 4.5 AEROSOL RESPIRATORY (INHALATION) at 20:13

## 2023-12-26 RX ADMIN — DOFETILIDE 500 MCG: 0.25 CAPSULE ORAL at 21:21

## 2023-12-26 RX ADMIN — PANTOPRAZOLE SODIUM 40 MG: 40 TABLET, DELAYED RELEASE ORAL at 21:23

## 2023-12-26 RX ADMIN — AMLODIPINE BESYLATE 2.5 MG: 5 TABLET ORAL at 19:29

## 2023-12-26 NOTE — LETTER
December 27, 2023     Patient: Gamaliel Vogt   YOB: 1965   Date of Visit: 12/21/2023       To Whom It May Concern:    It is my medical opinion that Gamaliel Vogt  was admitted to the hospital on 12/26/23 for a flutter remained in [facility] this day 12/27/23, and  is under the care of Dr. Navarro.  . This patient is going to have a procedure 12/28/23 that is a Cardioversion.          Sincerely,      Joanne lam      CC:   No Recipients

## 2023-12-27 LAB
ANION GAP SERPL CALCULATED.3IONS-SCNC: 11 MMOL/L (ref 5–15)
BUN SERPL-MCNC: 10 MG/DL (ref 6–20)
BUN/CREAT SERPL: 10.9 (ref 7–25)
CALCIUM SPEC-SCNC: 9.1 MG/DL (ref 8.6–10.5)
CHLORIDE SERPL-SCNC: 102 MMOL/L (ref 98–107)
CO2 SERPL-SCNC: 25 MMOL/L (ref 22–29)
CREAT SERPL-MCNC: 0.92 MG/DL (ref 0.76–1.27)
EGFRCR SERPLBLD CKD-EPI 2021: 95.8 ML/MIN/1.73
GLUCOSE SERPL-MCNC: 171 MG/DL (ref 65–99)
POTASSIUM SERPL-SCNC: 4 MMOL/L (ref 3.5–5.2)
QT INTERVAL: 425 MS
QT INTERVAL: 432 MS
QT INTERVAL: 449 MS
QTC INTERVAL: 453 MS
QTC INTERVAL: 495 MS
QTC INTERVAL: 534 MS
SODIUM SERPL-SCNC: 138 MMOL/L (ref 136–145)
WHOLE BLOOD HOLD SPECIMEN: NORMAL

## 2023-12-27 PROCEDURE — 93010 ELECTROCARDIOGRAM REPORT: CPT | Performed by: INTERNAL MEDICINE

## 2023-12-27 PROCEDURE — 94761 N-INVAS EAR/PLS OXIMETRY MLT: CPT

## 2023-12-27 PROCEDURE — 94799 UNLISTED PULMONARY SVC/PX: CPT

## 2023-12-27 PROCEDURE — S0260 H&P FOR SURGERY: HCPCS | Performed by: INTERNAL MEDICINE

## 2023-12-27 PROCEDURE — 80048 BASIC METABOLIC PNL TOTAL CA: CPT | Performed by: INTERNAL MEDICINE

## 2023-12-27 PROCEDURE — 93005 ELECTROCARDIOGRAM TRACING: CPT | Performed by: INTERNAL MEDICINE

## 2023-12-27 PROCEDURE — 94664 DEMO&/EVAL PT USE INHALER: CPT

## 2023-12-27 PROCEDURE — 5A2204Z RESTORATION OF CARDIAC RHYTHM, SINGLE: ICD-10-PCS | Performed by: INTERNAL MEDICINE

## 2023-12-27 PROCEDURE — 83735 ASSAY OF MAGNESIUM: CPT | Performed by: INTERNAL MEDICINE

## 2023-12-27 RX ORDER — AMLODIPINE BESYLATE 5 MG/1
10 TABLET ORAL DAILY
Status: DISCONTINUED | OUTPATIENT
Start: 2023-12-28 | End: 2023-12-29 | Stop reason: HOSPADM

## 2023-12-27 RX ADMIN — DOFETILIDE 500 MCG: 0.25 CAPSULE ORAL at 08:39

## 2023-12-27 RX ADMIN — LORAZEPAM 1 MG: 1 TABLET ORAL at 08:39

## 2023-12-27 RX ADMIN — BUDESONIDE AND FORMOTEROL FUMARATE DIHYDRATE 2 PUFF: 160; 4.5 AEROSOL RESPIRATORY (INHALATION) at 19:28

## 2023-12-27 RX ADMIN — PANTOPRAZOLE SODIUM 40 MG: 40 TABLET, DELAYED RELEASE ORAL at 08:39

## 2023-12-27 RX ADMIN — ZOLPIDEM TARTRATE 10 MG: 5 TABLET ORAL at 21:09

## 2023-12-27 RX ADMIN — PANTOPRAZOLE SODIUM 40 MG: 40 TABLET, DELAYED RELEASE ORAL at 21:03

## 2023-12-27 RX ADMIN — RIVAROXABAN 20 MG: 20 TABLET, FILM COATED ORAL at 08:39

## 2023-12-27 RX ADMIN — BUDESONIDE AND FORMOTEROL FUMARATE DIHYDRATE 2 PUFF: 160; 4.5 AEROSOL RESPIRATORY (INHALATION) at 07:50

## 2023-12-27 RX ADMIN — AMLODIPINE BESYLATE 2.5 MG: 5 TABLET ORAL at 08:38

## 2023-12-27 RX ADMIN — DOFETILIDE 500 MCG: 0.25 CAPSULE ORAL at 21:03

## 2023-12-27 NOTE — CASE MANAGEMENT/SOCIAL WORK
Discharge Planning Assessment  [facility]     Patient Name: Gamaliel Vogt  MRN: [id number]  Today's Date: 12/27/2023    Admit Date: 12/26/2023    Plan: Anticipate routine home with spouse. Enrolled in [place] for Tikosyn ($10 copay).   Discharge Needs Assessment       Row Name 12/27/23 1119       Living Environment    People in Home spouse    Name(s) of People in Home Spouse- Amanda    Current Living Arrangements home    Potentially Unsafe Housing Conditions none    Primary Care Provided by self    Provides Primary Care For no one    Family Caregiver if Needed spouse    Quality of Family Relationships helpful;involved;supportive    Able to Return to Prior Arrangements yes       Resource/Environmental Concerns    Resource/Environmental Concerns none    Transportation Concerns none       Transition Planning    Patient/Family Anticipates Transition to home with family    Patient/Family Anticipated Services at Transition none    Transportation Anticipated family or friend will provide       Discharge Needs Assessment    Readmission Within the Last 30 Days no previous admission in last 30 days    Equipment Currently Used at Home cpap    Concerns to be Addressed medication    Anticipated Changes Related to Illness none    Equipment Needed After Discharge none    Provided Post Acute Provider List? N/A              Discharge Plan       Row Name 12/27/23 1119       Plan    Plan Anticipate routine home with spouse. Enrolled in [employer] for Tikosyn ($10 copay).    Patient/Family in Agreement with Plan yes    Plan Comments CM contacted [facility] meds to bed to test claim cost of Tikosyn. Per pharmacist, Tikosyn 500mcg has $10 copay cost. CM met with patient and spouse at bedside to discuss dc planning and medication cost. PCP and pharmacy confirmed, reported no trouble affording medications, agreeable to meds to bed, and declined needs at this time for any DME/HH/PT services. Reported that he has a cpap at home through [facility] but unable to  tolerate his current mask/machine. Reported that he has an appt scheduled on 1/8/24 for sleep study to arrange a different type. DC Barriers: Scheduled to have cardioversion performed tomorrow, 12/28.                  Expected Discharge Date and Time       Expected Discharge Date Expected Discharge Time    Dec 29, 2023            Demographic Summary       Row Name 12/27/23 1119       General Information    Admission Type inpatient    Referral Source admission list    Reason for Consult discharge planning    Preferred Language English       Contact Information    Permission Granted to Share Info With                    Functional Status       Row Name 12/27/23 1119       Functional Status    Usual Activity Tolerance good    Current Activity Tolerance good       Functional Status, IADL    Medications independent    Meal Preparation independent    Housekeeping independent    Laundry independent    Shopping independent           Cynthia Nevarez RN     Office Phone: [phone]  Office Cell: [phone]

## 2023-12-27 NOTE — H&P
History of present illness  Gamaliel Vogt is a 58-year-old male patient who has hypertension, no significant CAD on heart cath in October 2023, obstructive sleep apnea uses CPAP, is here today to discuss about atrial fibrillation.  Patient has had atrial fibrillation since around at least 2015.  He has had a cardioversion in 2016 but unfortunately he reverted back into A-fib soon after.  It seems that he has been in A-fib since then.              Medical History        Past Medical History:   Diagnosis Date    Abnormal ECG      Arrhythmia      Asthma      Atrial fibrillation      Congenital heart disease      Coronary artery disease      Heart murmur      Hyperlipidemia      Hypertension      Sleep apnea       CPap          Surgical History         Past Surgical History:   Procedure Laterality Date    CARDIAC CATHETERIZATION Left 10/01/2023     Procedure: Left Heart Cath possible PCI;  Surgeon: Gemini Dyer MD;  Location: [facility] CATH INVASIVE LOCATION;  Service: Cardiology;  Laterality: Left;    CARDIAC CATHETERIZATION   10/1/2023    RETINAL DETACHMENT SURGERY        VITRECTOMY PARS PLANA Left 08/11/2020     Procedure: VITRECTOMY PARS PLANA  25GA  WITH ENDOLASER AND  FLUID GAS EXCHANGE LEFT EYE;  Surgeon: Lazarus, Howard S., MD;  Location: [facility] MAIN OR;  Service: Ophthalmology;  Laterality: Left;               Family History   Problem Relation Age of Onset    Asthma Mother      Heart disease Mother      No Known Problems Father      No Known Problems Sister      Arrhythmia Brother      Heart attack Brother      Heart disease Brother      No Known Problems Maternal Aunt      No Known Problems Maternal Uncle      No Known Problems Paternal Aunt      No Known Problems Paternal Uncle      No Known Problems Maternal Grandmother      No Known Problems Maternal Grandfather      No Known Problems Paternal Grandmother      No Known Problems Paternal Grandfather      No Known Problems Other      Anemia Neg Hx       Clotting disorder Neg Hx      Fainting Neg Hx      Heart failure Neg Hx      Hyperlipidemia Neg Hx      Hypertension Neg Hx        Social History            Tobacco Use    Smoking status: Never       Passive exposure: Never    Smokeless tobacco: Never   Vaping Use    Vaping Use: Never used   Substance Use Topics    Alcohol use: No    Drug use: Never        Prescriptions Prior to Admission   (Not in a hospital admission)      Allergies:  Patient has no known allergies.     Review of systems     Constitutional: Negative.    Respiratory and cardiovascular: As detailed in HPI section.  Gastrointestinal: Negative for constipation, nausea and vomiting negative for abdominal distention, abdominal pain and diarrhea.   Genitourinary: Negative for difficulty urinating and flank pain.   Musculoskeletal: Negative for arthralgias, joint swelling and myalgias.   Skin: Negative for color change, rash and wound.   Neurological: Negative for dizziness, syncope, weakness and headaches.   Hematological: Negative for adenopathy.   Psychiatric/Behavioral: Negative for confusion.   All other systems reviewed and are negative.     Physical Exam  VITALS REVIEWED     General:      well developed, in no acute distress.    Head:      normocephalic and atraumatic.    Eyes:      PERRL/EOM intact, conjunctiva and sclera clear with out nystagmus.    Neck:      no masses, thyromegaly,  trachea central with normal respiratory effort and PMI displaced laterally  Lungs:      Clear to auscultation bilaterally  Heart:       Irregular rhythm  Msk:      no deformity or scoliosis noted of thoracic or lumbar spine.    Pulses:      pulses normal in all 4 extremities.    Extremities:       No lower extremity edema  Neurologic:      no focal deficits.   alert oriented x3  Skin:      intact without lesions or rashes.    Psych:      alert and cooperative; normal mood and affect; normal attention span and concentration.             Result Review   :                     Pertinent cardiac workup     Heart cath 10/1/2023 no significant CAD  EKG 10/1/2023 atrial fibrillation  EKG November 6, 2019 atrial fibrillation     Procedures            Assessment   Assessment and Plan       Gamaliel Vogt is a 58-year-old male patient who has atrial fibrillation, based on chart review, A-fib seems to be chronic.  He had records with him from 2015, at that time EKG which I personally reviewed also showed atrial fibrillation.  He did have a cardioversion in 2016 but did not stay in sinus rhythm for long.  Normally rhythm control becomes very challenging in chronic A-fib situations, however patient is interested in trying to revert sinus rhythm.  I went over the available options for him including ablation versus trial of antiarrhythmic with cardioversion.  I personally think that antiarrhythmic and cardioversion is a good way to start in his case, because if it is successful then that could probably indicate that ablation in the future might be helpful, but on the other hand if despite antiarrhythmics, we cannot convert him to sinus rhythm or maintain sinus rhythm for any length of time, then probably ablation would not be helpful either.  Both of these options were discussed with the patient, and he would like to try antiarrhythmic and cardioversion first.  As far as choice of antiarrhythmic, in his case I think that the best option is to admit him to the hospital for Tikosyn loading followed by cardioversion after the second or third dose.  Patient is agreeable with the plan.  As far as stroke prevention he is on Xarelto without any bleeding issues.     Diagnoses and all orders for this visit:     1. Atrial fibrillation, chronic (Primary)     2. Primary hypertension     3. Long term current use of anticoagulant      Addendum    Patient was admitted to the hospital on 12/26/2023 for Tikosyn loading.  He was started on 500 mcg every 12 hours.  Current rhythm is atrial flutter.  We will go  ahead and schedule him for cardioversion for tomorrow.

## 2023-12-27 NOTE — PAYOR COMM NOTE
"Inpatient order 12/26/23    Direct admit - Tikosyn initiation.   MD Notes and clinical attached.   =====  AUTHORIZATION PENDING:   PLEASE FAX OR CALL DETERMINATION TO CONTACT BELOW:       THANK YOU,    JOHNNY Jones, RN  Utilization Review  [facility]  Phone: [phone]  Fax: [phone]      NPI: [id number]  Tax ID: [id number]        Gamaliel Small (58 y.o. Male)       Date of Birth   1965    Social Security Number       Address   [address] [place] [address]    Home Phone   [phone]    MRN   [id number]       Jewish   Zoroastrianism    Marital Status                               Admission Date   12/26/23    Admission Type   Elective    Admitting Provider   Cortez Navarro MD    Attending Provider   Cortez Navarro MD    Department, Room/Bed   [facility] PROGRESS CARE, 2131/1       Discharge Date       Discharge Disposition       Discharge Destination                                 Attending Provider: Cortez Navarro MD    Allergies: No Known Allergies    Isolation: None   Infection: None   Code Status: CPR    Ht: 177.8 cm (70\")   Wt: 106 kg (234 lb 9.1 oz)    Admission Cmt: None   Principal Problem: Persistent atrial fibrillation [I48.19]                   Active Insurance as of 12/26/2023       Primary Coverage       Payor Plan Insurance Group Employer/Plan Group    [employer] BLUE CROSS [facility] [employer] BLUE SHIELD PPO [id number]       Payor Plan Address Payor Plan Phone Number Payor Plan Fax Number Effective Dates    [address] [phone]  8/6/2023 - None Entered    [facility] [id number]         Subscriber Name Subscriber Birth Date Member ID       GAMALIEL SMALL 1965 [id number]                     Emergency Contacts        (Rel.) Home Phone Work Phone Mobile Phone    Amanda Small (Spouse) -- -- [phone]                 History & Physical        Cortez Navarro MD at 12/27/23 0851          History of present " illness  Gamaliel Vogt is a 58-year-old male patient who has hypertension, no significant CAD on heart cath in October 2023, obstructive sleep apnea uses CPAP, is here today to discuss about atrial fibrillation.  Patient has had atrial fibrillation since around at least 2015.  He has had a cardioversion in 2016 but unfortunately he reverted back into A-fib soon after.  It seems that he has been in A-fib since then.              Medical History        Past Medical History:   Diagnosis Date    Abnormal ECG      Arrhythmia      Asthma      Atrial fibrillation      Congenital heart disease      Coronary artery disease      Heart murmur      Hyperlipidemia      Hypertension      Sleep apnea       CPap          Surgical History         Past Surgical History:   Procedure Laterality Date    CARDIAC CATHETERIZATION Left 10/01/2023     Procedure: Left Heart Cath possible PCI;  Surgeon: Gemini Dyer MD;  Location: [facility] CATH INVASIVE LOCATION;  Service: Cardiology;  Laterality: Left;    CARDIAC CATHETERIZATION   10/1/2023    RETINAL DETACHMENT SURGERY        VITRECTOMY PARS PLANA Left 08/11/2020     Procedure: VITRECTOMY PARS PLANA  25GA  WITH ENDOLASER AND  FLUID GAS EXCHANGE LEFT EYE;  Surgeon: Lazarus, Howard S., MD;  Location: [facility] MAIN OR;  Service: Ophthalmology;  Laterality: Left;               Family History   Problem Relation Age of Onset    Asthma Mother      Heart disease Mother      No Known Problems Father      No Known Problems Sister      Arrhythmia Brother      Heart attack Brother      Heart disease Brother      No Known Problems Maternal Aunt      No Known Problems Maternal Uncle      No Known Problems Paternal Aunt      No Known Problems Paternal Uncle      No Known Problems Maternal Grandmother      No Known Problems Maternal Grandfather      No Known Problems Paternal Grandmother      No Known Problems Paternal Grandfather      No Known Problems Other      Anemia Neg Hx      Clotting disorder Neg  Hx      Fainting Neg Hx      Heart failure Neg Hx      Hyperlipidemia Neg Hx      Hypertension Neg Hx        Social History            Tobacco Use    Smoking status: Never       Passive exposure: Never    Smokeless tobacco: Never   Vaping Use    Vaping Use: Never used   Substance Use Topics    Alcohol use: No    Drug use: Never        Prescriptions Prior to Admission   (Not in a hospital admission)      Allergies:  Patient has no known allergies.     Review of systems     Constitutional: Negative.    Respiratory and cardiovascular: As detailed in HPI section.  Gastrointestinal: Negative for constipation, nausea and vomiting negative for abdominal distention, abdominal pain and diarrhea.   Genitourinary: Negative for difficulty urinating and flank pain.   Musculoskeletal: Negative for arthralgias, joint swelling and myalgias.   Skin: Negative for color change, rash and wound.   Neurological: Negative for dizziness, syncope, weakness and headaches.   Hematological: Negative for adenopathy.   Psychiatric/Behavioral: Negative for confusion.   All other systems reviewed and are negative.     Physical Exam  VITALS REVIEWED     General:      well developed, in no acute distress.    Head:      normocephalic and atraumatic.    Eyes:      PERRL/EOM intact, conjunctiva and sclera clear with out nystagmus.    Neck:      no masses, thyromegaly,  trachea central with normal respiratory effort and PMI displaced laterally  Lungs:      Clear to auscultation bilaterally  Heart:       Irregular rhythm  Msk:      no deformity or scoliosis noted of thoracic or lumbar spine.    Pulses:      pulses normal in all 4 extremities.    Extremities:       No lower extremity edema  Neurologic:      no focal deficits.   alert oriented x3  Skin:      intact without lesions or rashes.    Psych:      alert and cooperative; normal mood and affect; normal attention span and concentration.             Result Review   :                    Pertinent  cardiac workup     Heart cath 10/1/2023 no significant CAD  EKG 10/1/2023 atrial fibrillation  EKG November 6, 2019 atrial fibrillation     Procedures            Assessment   Assessment and Plan       Gamaliel Vogt is a 58-year-old male patient who has atrial fibrillation, based on chart review, A-fib seems to be chronic.  He had records with him from 2015, at that time EKG which I personally reviewed also showed atrial fibrillation.  He did have a cardioversion in 2016 but did not stay in sinus rhythm for long.  Normally rhythm control becomes very challenging in chronic A-fib situations, however patient is interested in trying to revert sinus rhythm.  I went over the available options for him including ablation versus trial of antiarrhythmic with cardioversion.  I personally think that antiarrhythmic and cardioversion is a good way to start in his case, because if it is successful then that could probably indicate that ablation in the future might be helpful, but on the other hand if despite antiarrhythmics, we cannot convert him to sinus rhythm or maintain sinus rhythm for any length of time, then probably ablation would not be helpful either.  Both of these options were discussed with the patient, and he would like to try antiarrhythmic and cardioversion first.  As far as choice of antiarrhythmic, in his case I think that the best option is to admit him to the hospital for Tikosyn loading followed by cardioversion after the second or third dose.  Patient is agreeable with the plan.  As far as stroke prevention he is on Xarelto without any bleeding issues.     Diagnoses and all orders for this visit:     1. Atrial fibrillation, chronic (Primary)     2. Primary hypertension     3. Long term current use of anticoagulant      Addendum    Patient was admitted to the hospital on 12/26/2023 for Tikosyn loading.  He was started on 500 mcg every 12 hours.  Current rhythm is atrial flutter.  We will go ahead and schedule  him for cardioversion for tomorrow.    Electronically signed by Cortez Navarro MD at 12/27/23 0852       Vital Signs (last day)       Date/Time Temp Temp src Pulse Resp BP Patient Position SpO2    12/27/23 0900 98.2 (36.8) Oral 84 14 153/106 -- 93    12/27/23 0751 -- -- 96 16 -- -- 95    12/27/23 0750 -- -- 84 16 -- -- 96    12/27/23 0543 98 (36.7) Oral 78 15 134/106 Lying 98    12/27/23 0139 97.8 (36.6) Oral 79 16 124/92 Lying 96    12/26/23 2121 97.4 (36.3) Oral 75 18 137/104 Lying 97    12/26/23 2015 -- -- 66 14 -- -- 98    12/26/23 2013 -- -- 67 14 -- -- 97    12/26/23 1700 97.5 (36.4) Oral 61 18 135/88 Lying 97          Facility-Administered Medications as of 12/27/2023   Medication Dose Route Frequency Provider Last Rate Last Admin    albuterol (PROVENTIL) nebulizer solution 0.083% 2.5 mg/3mL  2.5 mg Nebulization Q6H PRN Cortez Navarro MD        [START ON 12/28/2023] amLODIPine (NORVASC) tablet 10 mg  10 mg Oral Daily Cortez Navarro MD        budesonide-formoterol (SYMBICORT) 160-4.5 MCG/ACT inhaler 2 puff  2 puff Inhalation BID - RT Cortez Navarro MD   2 puff at 12/27/23 0750    Calcium Replacement - Follow Nurse / BPA Driven Protocol   Does not apply PRN Cortez Navarro MD        dofetilide (TIKOSYN) capsule 500 mcg  500 mcg Oral Q12H Cortez Navarro MD   500 mcg at 12/27/23 0839    LORazepam (ATIVAN) tablet 1 mg  1 mg Oral QAM Cortez Navarro MD   1 mg at 12/27/23 0839    Magnesium Cardiology Dose Replacement - Follow Nurse / BPA Driven Protocol   Does not apply PRN Cortez Navarro MD        nitroglycerin (NITROSTAT) SL tablet 0.4 mg  0.4 mg Sublingual Q5 Min PRN Cortez Navarro MD        pantoprazole (PROTONIX) EC tablet 40 mg  40 mg Oral BID Cortez Navarro MD   40 mg at 12/27/23 0839    Pharmacy Consult   Does not apply Once PRN Cortez Navarro MD        Phosphorus Replacement - Follow Nurse / BPA Driven Protocol   Does not apply Cortez Talavera MD         Potassium Replacement - Follow Nurse / BPA Driven Protocol   Does not apply PRN Cortez Navarro MD        rivaroxaban (XARELTO) tablet 20 mg  20 mg Oral Daily Cortez Navarro MD   20 mg at 12/27/23 0839    zolpidem (AMBIEN) tablet 10 mg  10 mg Oral Nightly PRN Cortez Navarro MD   10 mg at 12/26/23 2123     ECG/EMG Results (last 48 hours)       Procedure Component Value Units Date/Time    ECG 12 Lead QT Measurement [[id number]] Collected: 12/26/23 1722     Updated: 12/26/23 1722     QT Interval 410 ms      QTC Interval 412 ms     Narrative:      HEART RATE= 60  bpm  RR Interval= 992  ms  AR Interval=   ms  P Horizontal Axis=   deg  P Front Axis=   deg  QRSD Interval= 101  ms  QT Interval= 410  ms  QTcB= 412  ms  QRS Axis= 5  deg  T Wave Axis= 124  deg  - ABNORMAL ECG -  Atrial fibrillation  Abnormal T, consider ischemia, lateral leads  When compared with ECG of 01-Oct-2023 14:08:59,  No significant change  Electronically Signed By:   Date and Time of Study: 2023-12-26 17:22:10    SCANNED - TELEMETRY   [[id number]] Resulted: 12/26/23     Updated: 12/26/23 1908    SCANNED - TELEMETRY   [[id number]] Resulted: 12/26/23     Updated: 12/26/23 2126    ECG 12 Lead QT Measurement [[id number]] Collected: 12/26/23 2353     Updated: 12/26/23 2355     QT Interval 429 ms      QTC Interval 462 ms     Narrative:      HEART RATE= 67  bpm  RR Interval= 864  ms  AR Interval=   ms  P Horizontal Axis=   deg  P Front Axis=   deg  QRSD Interval= 96  ms  QT Interval= 429  ms  QTcB= 462  ms  QRS Axis= -5  deg  T Wave Axis= 76  deg  - ABNORMAL ECG -  Atrial fibrillation  When compared with ECG of 26-Dec-2023 17:22:10,  No significant change  Electronically Signed By:   Date and Time of Study: 2023-12-26 23:53:27    SCANNED - TELEMETRY   [[id number]] Resulted: 12/26/23     Updated: 12/27/23 0133    ECG 12 Lead QT Measurement [[id number]] Collected: 12/27/23 0556     Updated: 12/27/23 0557     QT Interval 425 ms      QTC Interval 453 ms      Narrative:      HEART RATE= 65  bpm  RR Interval= 880  ms  DC Interval=   ms  P Horizontal Axis=   deg  P Front Axis=   deg  QRSD Interval= 96  ms  QT Interval= 425  ms  QTcB= 453  ms  QRS Axis= -18  deg  T Wave Axis= 97  deg  - ABNORMAL ECG -  Atrial flutter with varied AV block,  Abnrm R prog, consider ASMI or lead placement  Electronically Signed By:   Date and Time of Study: 2023-12-27 05:56:39    SCANNED - TELEMETRY   [[id number]] Resulted: 12/26/23     Updated: 12/27/23 0622    SCANNED - TELEMETRY   [[id number]] Resulted: 12/26/23     Updated: 12/27/23 0949

## 2023-12-27 NOTE — PLAN OF CARE
Goal Outcome Evaluation:              Outcome Evaluation: patient had tikosyn today still in a flutter, blood pressure slightly high, new orders to increase norvasc to 10mg. patient verbalized understanding. plan for cardioversion NPO at midnight.

## 2023-12-27 NOTE — PLAN OF CARE
Goal Outcome Evaluation:         Hr sustaining in 70s, no rvr note. Baseline qtc 410, increase to 462 after first dose. Morning EKG scheduled for 0600. Will communicate findings to MD and oncoming nurse. VSS. Call light in reach, can make own needs known.

## 2023-12-28 ENCOUNTER — ANESTHESIA (OUTPATIENT)
Dept: CARDIOLOGY | Facility: HOSPITAL | Age: 58
End: 2023-12-28
Payer: COMMERCIAL

## 2023-12-28 ENCOUNTER — APPOINTMENT (OUTPATIENT)
Dept: CARDIOLOGY | Facility: HOSPITAL | Age: 58
DRG: 305 | End: 2023-12-28
Payer: COMMERCIAL

## 2023-12-28 ENCOUNTER — INPATIENT HOSPITAL (OUTPATIENT)
Dept: URBAN - METROPOLITAN AREA HOSPITAL 84 | Facility: HOSPITAL | Age: 58
End: 2023-12-28
Payer: COMMERCIAL

## 2023-12-28 ENCOUNTER — PREP FOR SURGERY (OUTPATIENT)
Dept: OTHER | Facility: HOSPITAL | Age: 58
End: 2023-12-28
Payer: COMMERCIAL

## 2023-12-28 ENCOUNTER — ANESTHESIA EVENT (OUTPATIENT)
Dept: CARDIOLOGY | Facility: HOSPITAL | Age: 58
End: 2023-12-28
Payer: COMMERCIAL

## 2023-12-28 VITALS — DIASTOLIC BLOOD PRESSURE: 104 MMHG | OXYGEN SATURATION: 100 % | SYSTOLIC BLOOD PRESSURE: 138 MMHG

## 2023-12-28 DIAGNOSIS — R13.10 DYSPHAGIA, UNSPECIFIED: ICD-10-CM

## 2023-12-28 DIAGNOSIS — R10.13 EPIGASTRIC PAIN: ICD-10-CM

## 2023-12-28 DIAGNOSIS — Z80.0 FAMILY HISTORY OF MALIGNANT NEOPLASM OF DIGESTIVE ORGANS: ICD-10-CM

## 2023-12-28 DIAGNOSIS — I48.19 ATRIAL FIBRILLATION, PERSISTENT: Primary | ICD-10-CM

## 2023-12-28 DIAGNOSIS — R11.0 NAUSEA: ICD-10-CM

## 2023-12-28 LAB
ANION GAP SERPL CALCULATED.3IONS-SCNC: 7 MMOL/L (ref 5–15)
BUN SERPL-MCNC: 10 MG/DL (ref 6–20)
BUN/CREAT SERPL: 10.3 (ref 7–25)
CALCIUM SPEC-SCNC: 9.3 MG/DL (ref 8.6–10.5)
CHLORIDE SERPL-SCNC: 104 MMOL/L (ref 98–107)
CO2 SERPL-SCNC: 30 MMOL/L (ref 22–29)
CREAT SERPL-MCNC: 0.97 MG/DL (ref 0.76–1.27)
EGFRCR SERPLBLD CKD-EPI 2021: 89.9 ML/MIN/1.73
GLUCOSE SERPL-MCNC: 111 MG/DL (ref 65–99)
MAGNESIUM SERPL-MCNC: 2.1 MG/DL (ref 1.6–2.6)
POTASSIUM SERPL-SCNC: 3.9 MMOL/L (ref 3.5–5.2)
QT INTERVAL: 424 MS
QTC INTERVAL: 529 MS
SODIUM SERPL-SCNC: 141 MMOL/L (ref 136–145)

## 2023-12-28 PROCEDURE — 94761 N-INVAS EAR/PLS OXIMETRY MLT: CPT

## 2023-12-28 PROCEDURE — 93010 ELECTROCARDIOGRAM REPORT: CPT | Performed by: INTERNAL MEDICINE

## 2023-12-28 PROCEDURE — 92960 CARDIOVERSION ELECTRIC EXT: CPT

## 2023-12-28 PROCEDURE — 25810000003 SODIUM CHLORIDE 0.9 % SOLUTION: Performed by: NURSE ANESTHETIST, CERTIFIED REGISTERED

## 2023-12-28 PROCEDURE — 94799 UNLISTED PULMONARY SVC/PX: CPT

## 2023-12-28 PROCEDURE — 92960 CARDIOVERSION ELECTRIC EXT: CPT | Performed by: INTERNAL MEDICINE

## 2023-12-28 PROCEDURE — 94664 DEMO&/EVAL PT USE INHALER: CPT

## 2023-12-28 PROCEDURE — 93005 ELECTROCARDIOGRAM TRACING: CPT | Performed by: INTERNAL MEDICINE

## 2023-12-28 PROCEDURE — 99232 SBSQ HOSP IP/OBS MODERATE 35: CPT | Performed by: INTERNAL MEDICINE

## 2023-12-28 PROCEDURE — 99222 1ST HOSP IP/OBS MODERATE 55: CPT

## 2023-12-28 PROCEDURE — 25010000002 PROPOFOL 10 MG/ML EMULSION: Performed by: NURSE ANESTHETIST, CERTIFIED REGISTERED

## 2023-12-28 RX ORDER — LORAZEPAM 0.5 MG/1
0.5 TABLET ORAL ONCE
Status: COMPLETED | OUTPATIENT
Start: 2023-12-28 | End: 2023-12-28

## 2023-12-28 RX ORDER — SODIUM, POTASSIUM,MAG SULFATES 17.5-3.13G
1 SOLUTION, RECONSTITUTED, ORAL ORAL EVERY 12 HOURS
Status: COMPLETED | OUTPATIENT
Start: 2023-12-28 | End: 2023-12-29

## 2023-12-28 RX ORDER — ACETAMINOPHEN 325 MG/1
650 TABLET ORAL EVERY 6 HOURS PRN
Status: DISCONTINUED | OUTPATIENT
Start: 2023-12-28 | End: 2023-12-29 | Stop reason: HOSPADM

## 2023-12-28 RX ORDER — SODIUM CHLORIDE 9 MG/ML
INJECTION, SOLUTION INTRAVENOUS CONTINUOUS PRN
Status: DISCONTINUED | OUTPATIENT
Start: 2023-12-28 | End: 2023-12-28 | Stop reason: SURG

## 2023-12-28 RX ORDER — LIDOCAINE HYDROCHLORIDE 10 MG/ML
INJECTION, SOLUTION EPIDURAL; INFILTRATION; INTRACAUDAL; PERINEURAL AS NEEDED
Status: DISCONTINUED | OUTPATIENT
Start: 2023-12-28 | End: 2023-12-28 | Stop reason: SURG

## 2023-12-28 RX ORDER — PROPOFOL 10 MG/ML
VIAL (ML) INTRAVENOUS AS NEEDED
Status: DISCONTINUED | OUTPATIENT
Start: 2023-12-28 | End: 2023-12-28 | Stop reason: SURG

## 2023-12-28 RX ADMIN — PANTOPRAZOLE SODIUM 40 MG: 40 TABLET, DELAYED RELEASE ORAL at 21:18

## 2023-12-28 RX ADMIN — LORAZEPAM 0.5 MG: 0.5 TABLET ORAL at 22:04

## 2023-12-28 RX ADMIN — LORAZEPAM 1 MG: 1 TABLET ORAL at 10:03

## 2023-12-28 RX ADMIN — AMLODIPINE BESYLATE 10 MG: 5 TABLET ORAL at 10:00

## 2023-12-28 RX ADMIN — Medication 1 BOTTLE: at 16:35

## 2023-12-28 RX ADMIN — DOFETILIDE 500 MCG: 0.25 CAPSULE ORAL at 21:18

## 2023-12-28 RX ADMIN — PANTOPRAZOLE SODIUM 40 MG: 40 TABLET, DELAYED RELEASE ORAL at 10:02

## 2023-12-28 RX ADMIN — BUDESONIDE AND FORMOTEROL FUMARATE DIHYDRATE 2 PUFF: 160; 4.5 AEROSOL RESPIRATORY (INHALATION) at 20:27

## 2023-12-28 RX ADMIN — LIDOCAINE HYDROCHLORIDE 100 MG: 10 INJECTION, SOLUTION EPIDURAL; INFILTRATION; INTRACAUDAL; PERINEURAL at 08:40

## 2023-12-28 RX ADMIN — DOFETILIDE 500 MCG: 0.25 CAPSULE ORAL at 10:00

## 2023-12-28 RX ADMIN — SODIUM CHLORIDE: 9 INJECTION, SOLUTION INTRAVENOUS at 08:36

## 2023-12-28 RX ADMIN — PROPOFOL 100 MG: 10 INJECTION, EMULSION INTRAVENOUS at 08:41

## 2023-12-28 RX ADMIN — BUDESONIDE AND FORMOTEROL FUMARATE DIHYDRATE 2 PUFF: 160; 4.5 AEROSOL RESPIRATORY (INHALATION) at 11:48

## 2023-12-28 NOTE — CASE MANAGEMENT/SOCIAL WORK
Continued Stay Note  [facility]     Patient Name: Gamaliel Vogt  MRN: [id number]  Today's Date: 12/28/2023    Admit Date: 12/26/2023    Plan: Anticipate routine home with spouse. Enrolled in [employer] for Tikosyn ($10 copay).   Discharge Plan       Row Name 12/28/23 1400       Plan    Plan Anticipate routine home with spouse. Enrolled in [employer] for Tikosyn ($10 copay).    Plan Comments Barrier to D/C: Cardioversion today, anticipate d/c once cleared by cardiology.                      Expected Discharge Date and Time       Expected Discharge Date Expected Discharge Time    Dec 29, 2023           Phone communication or documentation only - no physical contact with patient or family.     LAMAR RogelN, RN    [address] [address]    Office: [phone]  Fax: [phone]

## 2023-12-28 NOTE — PROGRESS NOTES
"    Reason for follow-up: Atrial fibrillation     Patient Care Team:  Rhea Carlson MD as PCP - General (Family Medicine)  ChemCortez mata MD as Consulting Physician (Cardiology)    Subjective .   Gamaliel Vogt is doing well today     ROS    Patient has no known allergies.    Scheduled Meds:amLODIPine, 10 mg, Oral, Daily  budesonide-formoterol, 2 puff, Inhalation, BID - RT  dofetilide, 500 mcg, Oral, Q12H  LORazepam, 1 mg, Oral, QAM  pantoprazole, 40 mg, Oral, BID  [Held by provider] rivaroxaban, 20 mg, Oral, Daily  sodium-potassium-magnesium sulfates, 1 bottle, Oral, Q12H      Continuous Infusions:   PRN Meds:.  albuterol    Calcium Replacement - Follow Nurse / BPA Driven Protocol    Magnesium Cardiology Dose Replacement - Follow Nurse / BPA Driven Protocol    nitroglycerin    Pharmacy Consult    Phosphorus Replacement - Follow Nurse / BPA Driven Protocol    Potassium Replacement - Follow Nurse / BPA Driven Protocol    zolpidem      VITAL SIGNS  Vitals:    12/28/23 1150 12/28/23 1215 12/28/23 1300 12/28/23 1600   BP:  (!) 141/102 132/83 134/90   BP Location:       Patient Position:       Pulse:   97 83   Resp: 16  20 17   Temp:   98.1 °F (36.7 °C) 97.9 °F (36.6 °C)   TempSrc:   Oral Oral   SpO2:   94%    Weight:       Height:           Flowsheet Rows      Flowsheet Row First Filed Value   Admission Height 177.8 cm (70\") Documented at 12/26/2023 1700   Admission Weight 107 kg (235 lb 10.8 oz)  [235.67lbs] Documented at 12/26/2023 1700               Physical Exam  VITALS REVIEWED    General:      well developed, in no acute distress.    Head:      normocephalic and atraumatic.    Eyes:      PERRL/EOM intact, conjunctiva and sclera clear with out nystagmus.    Neck:      no masses, thyromegaly,  trachea central with normal respiratory effort and PMI displaced laterally  Lungs:      Clear  Heart:       Irregular rhythm  Msk:      no deformity or scoliosis noted of thoracic or lumbar spine.    Pulses:      pulses " normal in all 4 extremities.    Extremities:       No lower extremity edema  Neurologic:      no focal deficits.   alert oriented x3  Skin:      intact without lesions or rashes.    Psych:      alert and cooperative; normal mood and affect; normal attention span and concentration.          LAB RESULTS (LAST 7 DAYS)    CBC        BMP  Results from last 7 days   Lab Units 12/27/23 2359 12/27/23 0743 12/26/23 2008   SODIUM mmol/L 141 138 141   POTASSIUM mmol/L 3.9 4.0 4.2   CHLORIDE mmol/L 104 102 104   CO2 mmol/L 30.0* 25.0 25.0   BUN mg/dL 10 10 12   CREATININE mg/dL 0.97 0.92 0.90   GLUCOSE mg/dL 111* 171* 125*   MAGNESIUM mg/dL 2.1  --  2.1       CMP   Results from last 7 days   Lab Units 12/27/23 2359 12/27/23 0743 12/26/23 2008   SODIUM mmol/L 141 138 141   POTASSIUM mmol/L 3.9 4.0 4.2   CHLORIDE mmol/L 104 102 104   CO2 mmol/L 30.0* 25.0 25.0   BUN mg/dL 10 10 12   CREATININE mg/dL 0.97 0.92 0.90   GLUCOSE mg/dL 111* 171* 125*         BNP        TROPONIN        CoAg        Creatinine Clearance  Estimated Creatinine Clearance: 99.5 mL/min (by C-G formula based on SCr of 0.97 mg/dL).    ABG          EKG    I personally reviewed the patient's EKG/Telemetry data: Atrial fibrillation      Assessment & Plan       Persistent atrial fibrillation      Gamaliel Vogt is a 58-year-old male patient, who was admitted hospital for Tikosyn loading followed by cardioversion.  Cardioversion was done on 12/28/2023, unfortunately it was unsuccessful most likely due to longevity of the atrial fibrillation.  I had a long discussion with the patient about management options moving forward including rate control versus attempting rhythm restoration this time with ablation.  I did communicate with him that even with ablation the chances of restoring and maintaining sinus rhythm may not be high due to chronicity of A-fib, however patient is interested in trying.  We will schedule him for outpatient A-fib ablation.  We will  continue Tikosyn since we are going to pursue rhythm restoration strategy.  I discussed the patients findings and my recommendations with patient and agrees with outlined plan.    Cortez Navarro MD  12/28/23  18:00 EST

## 2023-12-28 NOTE — ANESTHESIA POSTPROCEDURE EVALUATION
Patient: Gamaliel Vogt    Procedure Summary       Date: 12/28/23 Room / Location: [facility]    Anesthesia Start: 0837 Anesthesia Stop: 0848    Procedure: CARDIOVERSION EXTERNAL IN CARDIOLOGY DEPARTMENT Diagnosis: (atrial fibrillation)    Scheduled Providers: Cortez Navarro MD; Bhargavi Charles CRNA Provider: Harini Knapp MD    Anesthesia Type: general ASA Status: 3            Anesthesia Type: general    Vitals  Vitals Value Taken Time   /98 12/28/23 0946   Temp     Pulse 91 12/28/23 0945   Resp 17 12/28/23 0850   SpO2 98 % 12/28/23 0945   Vitals shown include unfiled device data.        Post Anesthesia Care and Evaluation    Patient location during evaluation: PACU  Patient participation: complete - patient participated  Level of consciousness: awake and alert  Pain management: satisfactory to patient    Airway patency: patent  Anesthetic complications: No anesthetic complications  PONV Status: none  Cardiovascular status: acceptable  Respiratory status: acceptable  Hydration status: acceptable

## 2023-12-28 NOTE — CONSULTS
GI CONSULT  NOTE:    Referring Provider:  Dr Navarro     Chief complaint: a fib    Subjective .     History of present illness: Gamaliel Vogt is a 58 y.o. male with history of A-fib on Xarelto presented to the hospital for Tikosyn loading.  He underwent for colonoscopy cardioversion first, but he remains in A-fib.  He was seen in our office and scheduled for EGD/colonoscopy, however, he needed clearance to hold his blood thinner.  EGD was ordered for persistent GERD symptoms despite PPI and sucralfate.  He has a family history of colon cancer in his father and has not yet had colonoscopy.  Due to Xarelto currently being held, GI has been consulted to do procedures as an inpatient.  Patient reports worsening reflux and dysphagia since October.  He has dysphagia daily and often chokes on his food.  He also complains of epigastric pain that radiates to his back.  When this is severe, it takes his breath away.  He has had some mild improvement with sucralfate and PPI, but the symptoms persist.  He has also not been using his CPAP due to feelings of nausea as well as worsening his pain.    Endo History:  No history of EGD/colonoscopy.    Past Medical History:  Past Medical History:   Diagnosis Date    Abnormal ECG     Arrhythmia     Asthma     Atrial fibrillation     Congenital heart disease     Coronary artery disease     Heart murmur     Hyperlipidemia     Hypertension     Sleep apnea     CPap        Past Surgical History:  Past Surgical History:   Procedure Laterality Date    CARDIAC CATHETERIZATION Left 10/01/2023    Procedure: Left Heart Cath possible PCI;  Surgeon: Gemini Dyer MD;  Location: [facility] CATH INVASIVE LOCATION;  Service: Cardiology;  Laterality: Left;    CARDIAC CATHETERIZATION  10/1/2023    RETINAL DETACHMENT SURGERY      VITRECTOMY PARS PLANA Left 08/11/2020    Procedure: VITRECTOMY PARS PLANA  25GA  WITH ENDOLASER AND  FLUID GAS EXCHANGE LEFT EYE;  Surgeon: Lazarus, Howard S., MD;  Location:   [facility];  Service: Ophthalmology;  Laterality: Left;       Social History:  Social History     Tobacco Use    Smoking status: Never     Passive exposure: Never    Smokeless tobacco: Never   Vaping Use    Vaping Use: Never used   Substance Use Topics    Alcohol use: No    Drug use: Never       Family History:  Family History   Problem Relation Age of Onset    Asthma Mother     Heart disease Mother     No Known Problems Father     No Known Problems Sister     Arrhythmia Brother     Heart attack Brother     Heart disease Brother     No Known Problems Maternal Aunt     No Known Problems Maternal Uncle     No Known Problems Paternal Aunt     No Known Problems Paternal Uncle     No Known Problems Maternal Grandmother     No Known Problems Maternal Grandfather     No Known Problems Paternal Grandmother     No Known Problems Paternal Grandfather     No Known Problems Other     Anemia Neg Hx     Clotting disorder Neg Hx     Fainting Neg Hx     Heart failure Neg Hx     Hyperlipidemia Neg Hx     Hypertension Neg Hx        Medications:  Medications Prior to Admission   Medication Sig Dispense Refill Last Dose    albuterol sulfate  (90 Base) MCG/ACT inhaler Inhale 2 puffs Every 4 (Four) Hours As Needed for Wheezing.   Past Week    amLODIPine (NORVASC) 2.5 MG tablet Take 1 tablet by mouth Daily. 30 tablet 11 12/25/2023    atenolol (TENORMIN) 25 MG tablet Take 1 tablet by mouth Daily.   12/26/2023    budesonide-formoterol (SYMBICORT) 160-4.5 MCG/ACT inhaler 2 (Two) Times a Day.   12/26/2023    LORazepam (ATIVAN) 1 MG tablet Take 1 tablet by mouth Every Morning. And if needed at night, but does not take 2nd dose often   12/26/2023    pantoprazole (PROTONIX) 40 MG EC tablet Take 1 tablet by mouth 2 (Two) Times a Day. 60 tablet 3 12/26/2023    rivaroxaban (XARELTO) 20 MG tablet Take 1 tablet by mouth Daily.   12/26/2023    sucralfate (CARAFATE) 1 g tablet Take 1 tablet by mouth 4 (Four) Times a Day.   12/26/2023     nitroglycerin (NITROSTAT) 0.4 MG SL tablet Place 1 tablet under the tongue Every 5 (Five) Minutes As Needed for Chest Pain (Only if SBP Greater Than 100). Take no more than 3 doses in 15 minutes. 30 tablet 12 More than a month       Scheduled Meds:amLODIPine, 10 mg, Oral, Daily  budesonide-formoterol, 2 puff, Inhalation, BID - RT  dofetilide, 500 mcg, Oral, Q12H  LORazepam, 1 mg, Oral, QAM  pantoprazole, 40 mg, Oral, BID  [Held by provider] rivaroxaban, 20 mg, Oral, Daily  sodium-potassium-magnesium sulfates, 1 bottle, Oral, Q12H      Continuous Infusions:   PRN Meds:.  albuterol    Calcium Replacement - Follow Nurse / BPA Driven Protocol    Magnesium Cardiology Dose Replacement - Follow Nurse / BPA Driven Protocol    nitroglycerin    Pharmacy Consult    Phosphorus Replacement - Follow Nurse / BPA Driven Protocol    Potassium Replacement - Follow Nurse / BPA Driven Protocol    zolpidem    ALLERGIES:  Patient has no known allergies.    ROS:  The following systems were reviewed and negative;   Constitution:  No fevers, chills, no unintentional weight loss  Skin: no rash, no jaundice  Eyes:  No blurry vision, no eye pain  HENT:  No change in hearing or smell  Resp:  No dyspnea or cough  CV:  No chest pain or palpitations  :  No dysuria, hematuria  Musculoskeletal:  No leg cramps or arthralgias  Neuro:  No tremor, no numbness  Psych:  No depression or confusion    Objective     Vital Signs:   Vitals:    12/28/23 0945 12/28/23 1148 12/28/23 1150 12/28/23 1215   BP: 143/98   (!) 141/102   BP Location:       Patient Position:       Pulse: 91      Resp:  16 16    Temp:       TempSrc:       SpO2: 98%      Weight:       Height:           Physical Exam:     General Appearance:    Awake and alert, in no acute distress   Head:    Normocephalic, without obvious abnormality, atraumatic   Throat:   No oral lesions, no thrush, oral mucosa moist   Lungs:     Respirations regular, even and unlabored   Chest Wall:    No abnormalities  "observed   Abdomen:     Soft, non-tender, no rebound or guarding, non-distended, no hepatosplenomegaly   Rectal:     Deferred   Extremities:   Moves all extremities, no edema, no cyanosis   Pulses:   Pulses palpable and equal bilaterally   Skin:   No rash, no jaundice, normal palpation       Neurologic:   Cranial nerves 2 - 12 grossly intact, no asterixis       Results Review:   I reviewed the patient's labs and imaging.  CBC      CMP   Results from last 7 days   Lab Units 12/27/23  2359 12/27/23  0743 12/26/23 2008   SODIUM mmol/L 141 138 141   POTASSIUM mmol/L 3.9 4.0 4.2   CHLORIDE mmol/L 104 102 104   CO2 mmol/L 30.0* 25.0 25.0   BUN mg/dL 10 10 12   CREATININE mg/dL 0.97 0.92 0.90   GLUCOSE mg/dL 111* 171* 125*   MAGNESIUM mg/dL 2.1  --  2.1     Cr Clearance Estimated Creatinine Clearance: 99.5 mL/min (by C-G formula based on SCr of 0.97 mg/dL).  Coag     HbA1C   Lab Results   Component Value Date    HGBA1C 5.60 09/29/2023     Blood Glucose No results found for: \"POCGLU\"  Infection     UA      Imaging Results (Last 72 Hours)       ** No results found for the last 72 hours. **            ASSESSMENT AND PLAN:    58-year-old male presented to the hospital for A-fib.  Underwent cardioversion without successful conversion to sinus rhythm.  Planning for Tikosyn loading. GI consulted to do endoscopic procedures while inpatient due to blood thinners currently being held.    -Epigastric pain  -Dyspepsia  -Dysphagia  -Nausea  -Family history of colon cancer  -Screening for neoplasia  -A-fib on Xarelto    Plan  Had been scheduled for egd/colonoscopy for FH Colon cancer as well as persistent gerd symptoms despite PPI and sucralfate. Continue to hold Xarelto.  Plan for EGD/colonoscopy tomorrow.  Clear liquid diet today, bowel prep overnight.  Continue PPI.  Ddx includes esophagitis vs stricture vs gastroparesis vs PUD vs others.   Likely can discharge home following procedures tomorrow.     I discussed the patients " findings and my recommendations with the patient.    We appreciate the referral    Electronically signed by SANDEEP Meade, 12/28/23, 2:21 PM EST.

## 2023-12-28 NOTE — PLAN OF CARE
Problem: Adult Inpatient Plan of Care  Goal: Plan of Care Review  Outcome: Ongoing, Progressing  Goal: Patient-Specific Goal (Individualized)  Outcome: Ongoing, Progressing  Goal: Absence of Hospital-Acquired Illness or Injury  Outcome: Ongoing, Progressing  Intervention: Identify and Manage Fall Risk  Recent Flowsheet Documentation  Taken 12/28/2023 0615 by Shirley Sanders RN  Safety Promotion/Fall Prevention:   safety round/check completed   room organization consistent  Taken 12/28/2023 0400 by Shirley Sanders RN  Safety Promotion/Fall Prevention:   safety round/check completed   room organization consistent  Taken 12/28/2023 0200 by Shirley Sanders RN  Safety Promotion/Fall Prevention:   safety round/check completed   room organization consistent  Taken 12/28/2023 0000 by Shirley Sanders RN  Safety Promotion/Fall Prevention:   safety round/check completed   room organization consistent  Taken 12/27/2023 2200 by Shirley Sanders RN  Safety Promotion/Fall Prevention:   safety round/check completed   room organization consistent  Taken 12/27/2023 2030 by Shirley Sanders RN  Safety Promotion/Fall Prevention:   safety round/check completed   room organization consistent  Intervention: Prevent Skin Injury  Recent Flowsheet Documentation  Taken 12/27/2023 2030 by Shirley Sanders RN  Skin Protection:   adhesive use limited   pulse oximeter probe site changed   tubing/devices free from skin contact  Intervention: Prevent Infection  Recent Flowsheet Documentation  Taken 12/28/2023 0615 by Shirley Sanders RN  Infection Prevention:   single patient room provided   rest/sleep promoted   hand hygiene promoted  Taken 12/28/2023 0400 by Shirley Sanders RN  Infection Prevention:   single patient room provided   rest/sleep promoted   hand hygiene promoted  Taken 12/28/2023 0200 by Shirley Sanders RN  Infection Prevention:   single patient room provided   rest/sleep promoted   hand hygiene promoted  Taken 12/28/2023 0000 by Shirley Sanders  RN  Infection Prevention:   single patient room provided   rest/sleep promoted   hand hygiene promoted  Taken 12/27/2023 2200 by Shirley Sanders RN  Infection Prevention:   single patient room provided   rest/sleep promoted   hand hygiene promoted  Taken 12/27/2023 2030 by Shirley Sanders RN  Infection Prevention:   single patient room provided   rest/sleep promoted   hand hygiene promoted  Goal: Optimal Comfort and Wellbeing  Outcome: Ongoing, Progressing  Intervention: Provide Person-Centered Care  Recent Flowsheet Documentation  Taken 12/28/2023 0400 by Shirley Sanders RN  Trust Relationship/Rapport:   care explained   choices provided  Taken 12/28/2023 0000 by Shirley Sanders RN  Trust Relationship/Rapport:   care explained   choices provided  Taken 12/27/2023 2030 by Shirley Sanders RN  Trust Relationship/Rapport:   care explained   choices provided  Goal: Readiness for Transition of Care  Outcome: Ongoing, Progressing     Problem: Dysrhythmia  Goal: Normalized Cardiac Rhythm  Outcome: Ongoing, Progressing   Goal Outcome Evaluation:

## 2023-12-28 NOTE — ANESTHESIA PREPROCEDURE EVALUATION
Anesthesia Evaluation     Patient summary reviewed and Nursing notes reviewed   no history of anesthetic complications:   NPO Solid Status: > 8 hours  NPO Liquid Status: > 8 hours           Airway   Mallampati: II  TM distance: >3 FB  Neck ROM: full  Large neck circumference  Dental - normal exam         Pulmonary - normal exam   (+) asthma,sleep apnea  Cardiovascular   Exercise tolerance: good (4-7 METS)    Rhythm: irregular  Rate: normal    (+) hypertension, CAD, dysrhythmias Paroxysmal Atrial Fib, hyperlipidemia      Neuro/Psych  GI/Hepatic/Renal/Endo - negative ROS     Musculoskeletal (-) negative ROS    Abdominal    Substance History - negative use     OB/GYN          Other - negative ROS       ROS/Med Hx Other: H/o hypertrophic cardiomyopathy per pt, followed by manch.  Echo in computer from last year appears mostly normal.                Anesthesia Plan    ASA 3     general   total IV anesthesia  intravenous induction     Anesthetic plan, risks, benefits, and alternatives have been provided, discussed and informed consent has been obtained with: patient.    Plan discussed with CRNA and CAA.

## 2023-12-29 ENCOUNTER — ANESTHESIA (OUTPATIENT)
Dept: GASTROENTEROLOGY | Facility: HOSPITAL | Age: 58
End: 2023-12-29
Payer: COMMERCIAL

## 2023-12-29 ENCOUNTER — READMISSION MANAGEMENT (OUTPATIENT)
Dept: CALL CENTER | Facility: HOSPITAL | Age: 58
End: 2023-12-29
Payer: COMMERCIAL

## 2023-12-29 ENCOUNTER — INPATIENT HOSPITAL (OUTPATIENT)
Dept: URBAN - METROPOLITAN AREA HOSPITAL 84 | Facility: HOSPITAL | Age: 58
End: 2023-12-29
Payer: COMMERCIAL

## 2023-12-29 ENCOUNTER — ANESTHESIA EVENT (OUTPATIENT)
Dept: GASTROENTEROLOGY | Facility: HOSPITAL | Age: 58
End: 2023-12-29
Payer: COMMERCIAL

## 2023-12-29 VITALS
HEART RATE: 95 BPM | TEMPERATURE: 97.5 F | WEIGHT: 236.99 LBS | DIASTOLIC BLOOD PRESSURE: 84 MMHG | OXYGEN SATURATION: 97 % | SYSTOLIC BLOOD PRESSURE: 116 MMHG | RESPIRATION RATE: 15 BRPM | BODY MASS INDEX: 33.93 KG/M2 | HEIGHT: 70 IN

## 2023-12-29 DIAGNOSIS — K44.9 DIAPHRAGMATIC HERNIA WITHOUT OBSTRUCTION OR GANGRENE: ICD-10-CM

## 2023-12-29 DIAGNOSIS — R10.13 EPIGASTRIC PAIN: ICD-10-CM

## 2023-12-29 DIAGNOSIS — K22.70 BARRETT'S ESOPHAGUS WITHOUT DYSPLASIA: ICD-10-CM

## 2023-12-29 DIAGNOSIS — R13.10 DYSPHAGIA, UNSPECIFIED: ICD-10-CM

## 2023-12-29 DIAGNOSIS — Z12.11 ENCOUNTER FOR SCREENING FOR MALIGNANT NEOPLASM OF COLON: ICD-10-CM

## 2023-12-29 DIAGNOSIS — K64.8 OTHER HEMORRHOIDS: ICD-10-CM

## 2023-12-29 DIAGNOSIS — D12.2 BENIGN NEOPLASM OF ASCENDING COLON: ICD-10-CM

## 2023-12-29 LAB
ALBUMIN SERPL-MCNC: 4.4 G/DL (ref 3.5–5.2)
ALBUMIN/GLOB SERPL: 1.4 G/DL
ALP SERPL-CCNC: 74 U/L (ref 39–117)
ALT SERPL W P-5'-P-CCNC: 25 U/L (ref 1–41)
ANION GAP SERPL CALCULATED.3IONS-SCNC: 11 MMOL/L (ref 5–15)
AST SERPL-CCNC: 27 U/L (ref 1–40)
BASOPHILS # BLD AUTO: 0.1 10*3/MM3 (ref 0–0.2)
BASOPHILS NFR BLD AUTO: 1.2 % (ref 0–1.5)
BILIRUB SERPL-MCNC: 0.6 MG/DL (ref 0–1.2)
BUN SERPL-MCNC: 11 MG/DL (ref 6–20)
BUN/CREAT SERPL: 12.5 (ref 7–25)
CALCIUM SPEC-SCNC: 9.7 MG/DL (ref 8.6–10.5)
CHLORIDE SERPL-SCNC: 100 MMOL/L (ref 98–107)
CO2 SERPL-SCNC: 29 MMOL/L (ref 22–29)
CREAT SERPL-MCNC: 0.88 MG/DL (ref 0.76–1.27)
DEPRECATED RDW RBC AUTO: 45.9 FL (ref 37–54)
EGFRCR SERPLBLD CKD-EPI 2021: 99.1 ML/MIN/1.73
EOSINOPHIL # BLD AUTO: 0.3 10*3/MM3 (ref 0–0.4)
EOSINOPHIL NFR BLD AUTO: 3.9 % (ref 0.3–6.2)
ERYTHROCYTE [DISTWIDTH] IN BLOOD BY AUTOMATED COUNT: 13.9 % (ref 12.3–15.4)
GLOBULIN UR ELPH-MCNC: 3.1 GM/DL
GLUCOSE SERPL-MCNC: 91 MG/DL (ref 65–99)
HCT VFR BLD AUTO: 47.5 % (ref 37.5–51)
HGB BLD-MCNC: 16 G/DL (ref 13–17.7)
LYMPHOCYTES # BLD AUTO: 1.9 10*3/MM3 (ref 0.7–3.1)
LYMPHOCYTES NFR BLD AUTO: 22.4 % (ref 19.6–45.3)
MAGNESIUM SERPL-MCNC: 2.4 MG/DL (ref 1.6–2.6)
MCH RBC QN AUTO: 30.6 PG (ref 26.6–33)
MCHC RBC AUTO-ENTMCNC: 33.7 G/DL (ref 31.5–35.7)
MCV RBC AUTO: 90.6 FL (ref 79–97)
MONOCYTES # BLD AUTO: 1 10*3/MM3 (ref 0.1–0.9)
MONOCYTES NFR BLD AUTO: 11.9 % (ref 5–12)
NEUTROPHILS NFR BLD AUTO: 5.1 10*3/MM3 (ref 1.7–7)
NEUTROPHILS NFR BLD AUTO: 60.6 % (ref 42.7–76)
NRBC BLD AUTO-RTO: 0.1 /100 WBC (ref 0–0.2)
PLATELET # BLD AUTO: 217 10*3/MM3 (ref 140–450)
PMV BLD AUTO: 9.8 FL (ref 6–12)
POTASSIUM SERPL-SCNC: 3.8 MMOL/L (ref 3.5–5.2)
PROT SERPL-MCNC: 7.5 G/DL (ref 6–8.5)
QT INTERVAL: 423 MS
QT INTERVAL: 485 MS
QTC INTERVAL: 548 MS
QTC INTERVAL: 550 MS
RBC # BLD AUTO: 5.24 10*6/MM3 (ref 4.14–5.8)
SODIUM SERPL-SCNC: 140 MMOL/L (ref 136–145)
WBC NRBC COR # BLD AUTO: 8.4 10*3/MM3 (ref 3.4–10.8)

## 2023-12-29 PROCEDURE — 94760 N-INVAS EAR/PLS OXIMETRY 1: CPT

## 2023-12-29 PROCEDURE — 0DBK8ZX EXCISION OF ASCENDING COLON, VIA NATURAL OR ARTIFICIAL OPENING ENDOSCOPIC, DIAGNOSTIC: ICD-10-PCS | Performed by: INTERNAL MEDICINE

## 2023-12-29 PROCEDURE — 43239 EGD BIOPSY SINGLE/MULTIPLE: CPT | Performed by: INTERNAL MEDICINE

## 2023-12-29 PROCEDURE — 93010 ELECTROCARDIOGRAM REPORT: CPT | Performed by: INTERNAL MEDICINE

## 2023-12-29 PROCEDURE — 80053 COMPREHEN METABOLIC PANEL: CPT

## 2023-12-29 PROCEDURE — 88305 TISSUE EXAM BY PATHOLOGIST: CPT | Performed by: INTERNAL MEDICINE

## 2023-12-29 PROCEDURE — 88312 SPECIAL STAINS GROUP 1: CPT | Performed by: INTERNAL MEDICINE

## 2023-12-29 PROCEDURE — 94761 N-INVAS EAR/PLS OXIMETRY MLT: CPT

## 2023-12-29 PROCEDURE — 25010000002 PROPOFOL 200 MG/20ML EMULSION

## 2023-12-29 PROCEDURE — 94799 UNLISTED PULMONARY SVC/PX: CPT

## 2023-12-29 PROCEDURE — 94664 DEMO&/EVAL PT USE INHALER: CPT

## 2023-12-29 PROCEDURE — 83735 ASSAY OF MAGNESIUM: CPT | Performed by: INTERNAL MEDICINE

## 2023-12-29 PROCEDURE — 85025 COMPLETE CBC W/AUTO DIFF WBC: CPT

## 2023-12-29 PROCEDURE — 43450 DILATE ESOPHAGUS 1/MULT PASS: CPT | Performed by: INTERNAL MEDICINE

## 2023-12-29 PROCEDURE — 45385 COLONOSCOPY W/LESION REMOVAL: CPT | Mod: 33 | Performed by: INTERNAL MEDICINE

## 2023-12-29 PROCEDURE — 25810000003 SODIUM CHLORIDE 0.9 % SOLUTION

## 2023-12-29 PROCEDURE — 0DB48ZX EXCISION OF ESOPHAGOGASTRIC JUNCTION, VIA NATURAL OR ARTIFICIAL OPENING ENDOSCOPIC, DIAGNOSTIC: ICD-10-PCS | Performed by: INTERNAL MEDICINE

## 2023-12-29 PROCEDURE — 0D758ZZ DILATION OF ESOPHAGUS, VIA NATURAL OR ARTIFICIAL OPENING ENDOSCOPIC: ICD-10-PCS | Performed by: INTERNAL MEDICINE

## 2023-12-29 PROCEDURE — 93005 ELECTROCARDIOGRAM TRACING: CPT | Performed by: INTERNAL MEDICINE

## 2023-12-29 PROCEDURE — 0DBH8ZX EXCISION OF CECUM, VIA NATURAL OR ARTIFICIAL OPENING ENDOSCOPIC, DIAGNOSTIC: ICD-10-PCS | Performed by: INTERNAL MEDICINE

## 2023-12-29 PROCEDURE — 0DBL8ZX EXCISION OF TRANSVERSE COLON, VIA NATURAL OR ARTIFICIAL OPENING ENDOSCOPIC, DIAGNOSTIC: ICD-10-PCS | Performed by: INTERNAL MEDICINE

## 2023-12-29 RX ORDER — AMLODIPINE BESYLATE 10 MG/1
10 TABLET ORAL DAILY
Qty: 30 TABLET | Refills: 2 | Status: SHIPPED | OUTPATIENT
Start: 2023-12-30 | End: 2024-03-29

## 2023-12-29 RX ORDER — SODIUM CHLORIDE 9 MG/ML
INJECTION, SOLUTION INTRAVENOUS CONTINUOUS PRN
Status: DISCONTINUED | OUTPATIENT
Start: 2023-12-29 | End: 2023-12-29 | Stop reason: SURG

## 2023-12-29 RX ORDER — PROPOFOL 10 MG/ML
INJECTION, EMULSION INTRAVENOUS AS NEEDED
Status: DISCONTINUED | OUTPATIENT
Start: 2023-12-29 | End: 2023-12-29 | Stop reason: SURG

## 2023-12-29 RX ORDER — LIDOCAINE HYDROCHLORIDE 20 MG/ML
INJECTION, SOLUTION EPIDURAL; INFILTRATION; INTRACAUDAL; PERINEURAL AS NEEDED
Status: DISCONTINUED | OUTPATIENT
Start: 2023-12-29 | End: 2023-12-29 | Stop reason: SURG

## 2023-12-29 RX ORDER — DOFETILIDE 0.5 MG/1
500 CAPSULE ORAL EVERY 12 HOURS SCHEDULED
Qty: 60 CAPSULE | Refills: 1 | Status: SHIPPED | OUTPATIENT
Start: 2023-12-29 | End: 2024-02-27

## 2023-12-29 RX ADMIN — PROPOFOL 30 MG: 10 INJECTION, EMULSION INTRAVENOUS at 10:31

## 2023-12-29 RX ADMIN — PROPOFOL 40 MG: 10 INJECTION, EMULSION INTRAVENOUS at 10:35

## 2023-12-29 RX ADMIN — PROPOFOL 50 MG: 10 INJECTION, EMULSION INTRAVENOUS at 10:29

## 2023-12-29 RX ADMIN — LIDOCAINE HYDROCHLORIDE 60 MG: 20 INJECTION, SOLUTION EPIDURAL; INFILTRATION; INTRACAUDAL; PERINEURAL at 10:29

## 2023-12-29 RX ADMIN — PANTOPRAZOLE SODIUM 40 MG: 40 TABLET, DELAYED RELEASE ORAL at 07:16

## 2023-12-29 RX ADMIN — PROPOFOL 20 MG: 10 INJECTION, EMULSION INTRAVENOUS at 10:45

## 2023-12-29 RX ADMIN — PROPOFOL 30 MG: 10 INJECTION, EMULSION INTRAVENOUS at 10:37

## 2023-12-29 RX ADMIN — PROPOFOL 20 MG: 10 INJECTION, EMULSION INTRAVENOUS at 10:53

## 2023-12-29 RX ADMIN — Medication 1 BOTTLE: at 03:07

## 2023-12-29 RX ADMIN — ALBUTEROL SULFATE 2.5 MG: 2.5 SOLUTION RESPIRATORY (INHALATION) at 05:16

## 2023-12-29 RX ADMIN — BUDESONIDE AND FORMOTEROL FUMARATE DIHYDRATE 2 PUFF: 160; 4.5 AEROSOL RESPIRATORY (INHALATION) at 09:53

## 2023-12-29 RX ADMIN — PROPOFOL 20 MG: 10 INJECTION, EMULSION INTRAVENOUS at 10:39

## 2023-12-29 RX ADMIN — PROPOFOL 30 MG: 10 INJECTION, EMULSION INTRAVENOUS at 10:32

## 2023-12-29 RX ADMIN — PROPOFOL 30 MG: 10 INJECTION, EMULSION INTRAVENOUS at 10:49

## 2023-12-29 RX ADMIN — AMLODIPINE BESYLATE 10 MG: 5 TABLET ORAL at 07:16

## 2023-12-29 RX ADMIN — LORAZEPAM 1 MG: 1 TABLET ORAL at 07:17

## 2023-12-29 RX ADMIN — SODIUM CHLORIDE: 9 INJECTION, SOLUTION INTRAVENOUS at 10:24

## 2023-12-29 RX ADMIN — PROPOFOL 20 MG: 10 INJECTION, EMULSION INTRAVENOUS at 10:41

## 2023-12-29 RX ADMIN — DOFETILIDE 500 MCG: 0.25 CAPSULE ORAL at 07:15

## 2023-12-29 NOTE — PAYOR COMM NOTE
"This is discharge notification for Gamaliel Small  Reference/Auth # [id number]   Pt discharged routine to home on 12/29/23.    JOHNNY Dobbs, RN, [facility]  Utilization Review Nurse  [facility]  Direct & confidential phone # [phone]  Fax # [phone]      Gamaliel Small (58 y.o. Male)       Date of Birth   1965    Social Security Number       Address   [address] [place] [address]    Home Phone   [phone]    MRN   [id number]       Christian   Oriental orthodox    Marital Status                               Admission Date   12/26/23    Admission Type   Elective    Admitting Provider   Cortez Navarro MD    Attending Provider       Department, Room/Bed   [facility] PROGRESS CARE, 2131/1       Discharge Date   12/29/2023    Discharge Disposition   Home or Self Care    Discharge Destination                                 Attending Provider: (none)   Allergies: No Known Allergies    Isolation: None   Infection: None   Code Status: CPR    Ht: 177.8 cm (70\")   Wt: 107 kg (236 lb 15.9 oz)    Admission Cmt: None   Principal Problem: Persistent atrial fibrillation [I48.19]                   Active Insurance as of 12/26/2023       Primary Coverage       Payor Plan Insurance Group Employer/Plan Group    [employer] [facility] PPO [id number]       Payor Plan Address Payor Plan Phone Number Payor Plan Fax Number Effective Dates    [address] [phone]  8/6/2023 - None Entered    [place] [address]         Subscriber Name Subscriber Birth Date Member ID       GAMALIEL SMALL 1965 [id number]                     Emergency Contacts        (Rel.) Home Phone Work Phone Mobile Phone    Amanda Small (Spouse) -- -- [phone]              Discharge Summary    No notes of this type exist for this encounter.       "

## 2023-12-29 NOTE — CASE MANAGEMENT/SOCIAL WORK
Case Management Discharge Note      Final Note: Home with family    Provided Post Acute Provider List?: N/A    Selected Continued Care - Admitted Since 12/26/2023          Transportation Services  Private: Car    Final Discharge Disposition Code: 01 - home or self-care

## 2023-12-29 NOTE — PLAN OF CARE
Goal Outcome Evaluation:  Patient oriented x4, completed bowel prep, for colonoscopy and EGD planned for today

## 2023-12-29 NOTE — OP NOTE
COLONOSCOPY, ESOPHAGOGASTRODUODENOSCOPY Procedure Report    Patient Name:  Gamaliel Vogt  YOB: 1965    Date of Surgery:  12/29/2023     Pre-Op Diagnosis:  Screening for malignant neoplasm of colon [Z12.11]  Dysphagia, unspecified type [R13.10]  Epigastric pain [R10.13]  Dyspepsia [R10.13]       Post-Op Diagnosis Codes:     * Screening for malignant neoplasm of colon [Z12.11]     * Dysphagia, unspecified type [R13.10]     * Epigastric pain [R10.13]     * Dyspepsia [R10.13]    Postop diagnosis:  1.  Hiatal hernia  2.  Colon polyps  3.  Internal hemorrhoids      Procedure/CPT® Codes:      Procedure(s):  COLONOSCOPY WITH POLYPECTOMY X4  ESOPHAGOGASTRODUODENOSCOPY WITH DILATION (BOUGIE 54, 58) AND BIOPSY X1 AREA    Staff:  Surgeon(s):  PHAN De León MD      Anesthesia: Monitored Anesthesia Care    Description of Procedure:  A description of the procedure as well as risks, benefits and alternative methods were explained to the patient who voiced understanding and signed the corresponding consent form. A physical exam was performed and vital signs were monitored throughout the procedure.    An upper GI endoscope was placed into the mouth and proceeded through the esophagus, stomach and second portion of the duodenum without difficulty. The scope was then retroflexed and the fundus was visualized. The procedure was not difficult and there were no immediate complications.  There was no blood loss.    Next, A rectal exam was performed which was normal. An Olympus colonoscope was placed into the rectum and proceeded under direct visualization through the colon until the cecum and appendiceal orifice were identified. Careful visualization occurred upon slow withdraw of the scope. The scope was then retroflexed and the distal rectum was visualized. The quality of the prep was good. The procedure was not difficult and there were no immediate complications.  There was no blood loss.    EGD findings:  1.  Few  small white plaques at the GE junction these were biopsied with cold forceps to rule out Candida esophagitis, but the rest of the esophagus did not have any white plaques and had normal mucosa of the whole esophagus.  Given his dysphagia empiric dilation was performed with nonguided bougie Velazquez dilators successfully, initially with 54 Sammarinese diameter, increased up to 58 Sammarinese with minimal resistance and post look endoscopy showed no mucosal splitting.  There was no esophageal stricture seen.  2.  Medium size sliding hiatal hernia, approximately 3 cm in length, Hill grade 3.  3.  Normal mucosa of the whole stomach  4.  Normal mucosa of the duodenal bulb and second portion of the duodenum    Colonoscopy findings:  1.  4 colon polyps throughout the colon which appeared benign, sessile and were removed in a single piece with cold snare polypectomy and were completely removed.  1 polyp in the cecum 3 mm in diameter.  1 polyp in the ascending colon 8 mm in diameter.  2 polyps in the transverse colon 3 to 5 mm in diameter.  2.  Medium size nonbleeding internal hemorrhoids  3.  Normal mucosa of the terminal ileum. Prominent IC valve.  4.  Otherwise normal mucosa throughout the colon    Recommendations:  -Recall for colonoscopy based on pathology. Repeat colonoscopy in: 3 years if 3 or more adenomatous polyps or polyp over 10 mm; 5 years if sessile serrated adenoma; 7 years if 1 or 2 adenomatous polyps; otherwise 10 years unless needed sooner  -Polyp prevention education  -Fiber supplements daily if constipation  -Continue PPI daily  -Follow-up in the office as scheduled, if persistent dysphagia recommend esophageal manometry next as an outpatient    -Okay to resume anticoagulation or antiplatelet agents  -Okay for discharge home today from GI perspective    GI team to sign off, but please feel free to call us back if further assistance is needed.  Thank you      ERICA De León MD     Date: 12/29/2023    Time: 11:09  EST

## 2023-12-29 NOTE — ANESTHESIA PREPROCEDURE EVALUATION
Anesthesia Evaluation     Patient summary reviewed and Nursing notes reviewed   no history of anesthetic complications:   NPO Solid Status: > 8 hours  NPO Liquid Status: > 8 hours           Airway   Mallampati: II  TM distance: >3 FB  Neck ROM: full  Large neck circumference  Dental - normal exam         Pulmonary - normal exam   (+) asthma,sleep apnea  Cardiovascular   Exercise tolerance: good (4-7 METS)    ECG reviewed  Rhythm: irregular  Rate: normal    (+) hypertension, CAD, dysrhythmias Paroxysmal Atrial Fib, hyperlipidemia      Neuro/Psych  GI/Hepatic/Renal/Endo - negative ROS     Musculoskeletal (-) negative ROS    Abdominal    Substance History - negative use     OB/GYN          Other - negative ROS       ROS/Med Hx Other: H/o hypertrophic cardiomyopathy per pt, followed by manch.  Echo in computer from last year appears mostly normal.                    Anesthesia Plan    ASA 3     general   total IV anesthesia  intravenous induction     Anesthetic plan, risks, benefits, and alternatives have been provided, discussed and informed consent has been obtained with: patient.    Plan discussed with CRNA and CAA.

## 2023-12-29 NOTE — ANESTHESIA POSTPROCEDURE EVALUATION
Patient: Gamaliel Vogt    Procedure Summary       Date: 12/29/23 Room / [facility] END[facility]COPY [facility]    Anesthesia Start: 1024 Anesthesia Stop: 1101    Procedures:       COLONOSCOPY WITH POLYPECTOMY X4      ESOPHAGOGASTRODUODENOSCOPY WITH DILATION (BOUGIE 54, 58) AND BIOPSY X1 AREA Diagnosis:       Screening for malignant neoplasm of colon      Dysphagia, unspecified type      Epigastric pain      Dyspepsia      (Screening for malignant neoplasm of colon [Z12.11])      (Dysphagia, unspecified type [R13.10])      (Epigastric pain [R10.13])      (Dyspepsia [R10.13])    Surgeons: PHAN De León MD Provider: Harini Knapp MD    Anesthesia Type: general ASA Status: 3            Anesthesia Type: general    Vitals  Vitals Value Taken Time   /92 12/29/23 1123   Temp     Pulse 97 12/29/23 1141   Resp 15 12/29/23 1116   SpO2 96 % 12/29/23 1126   Vitals shown include unfiled device data.        Post Anesthesia Care and Evaluation    Patient location during evaluation: PACU  Patient participation: complete - patient participated  Level of consciousness: awake and alert  Pain management: satisfactory to patient    Airway patency: patent  Anesthetic complications: No anesthetic complications  PONV Status: none  Cardiovascular status: acceptable  Respiratory status: acceptable  Hydration status: acceptable

## 2023-12-29 NOTE — PLAN OF CARE
Problem: Adult Inpatient Plan of Care  Goal: Plan of Care Review  12/29/2023 0530 by Kathy Sanchez LPN  Outcome: Ongoing, Progressing   Goal Outcome Evaluation:EGD and colonoscopy planned for this morning

## 2023-12-30 NOTE — OUTREACH NOTE
Prep Survey      Flowsheet Row Responses   [facility] discharged from? [facility]   Is LACE score < 7 ? No   Eligibility Readm Mgmt   Discharge diagnosis Atrial fibrillation, chronic   Does the patient have one of the following disease processes/diagnoses(primary or secondary)? Other   Does the patient have Home health ordered? No   Is there a DME ordered? No   Medication alerts for this patient Tikosyn   Prep survey completed? Yes            EMBER VÁSQUEZ - Registered Nurse

## 2024-01-02 LAB
LAB AP CASE REPORT: NORMAL
PATH REPORT.FINAL DX SPEC: NORMAL
PATH REPORT.GROSS SPEC: NORMAL

## 2024-01-04 ENCOUNTER — READMISSION MANAGEMENT (OUTPATIENT)
Dept: CALL CENTER | Facility: HOSPITAL | Age: 59
End: 2024-01-04
Payer: COMMERCIAL

## 2024-01-04 NOTE — OUTREACH NOTE
Medical Week 1 Survey      Flowsheet Row Responses   [facility] patient discharged from? [facility]   Does the patient have one of the following disease processes/diagnoses(primary or secondary)? Other   Week 1 attempt successful? Yes   Call start time 1201   Call end time 1204   Discharge diagnosis Atrial fibrillation, chronic   Meds reviewed with patient/caregiver? Yes   Is the patient having any side effects they believe may be caused by any medication additions or changes? No   Does the patient have all medications ordered at discharge? Yes   Is the patient taking all medications as directed (includes completed medication regime)? Yes   Does the patient have a primary care provider?  Yes   Does the patient have an appointment with their PCP within 7 days of discharge? Greater than 7 days   What is preventing the patient from scheduling follow up appointments within 7 days of discharge? Waiting on return call   Nursing Interventions Advised patient to make appointment   Has the patient kept scheduled appointments due by today? Yes   Psychosocial issues? No   Did the patient receive a copy of their discharge instructions? Yes   Nursing interventions Reviewed instructions with patient   What is the patient's perception of their health status since discharge? Improving   Is the patient/caregiver able to teach back signs and symptoms related to disease process for when to call PCP? Yes   Is the patient/caregiver able to teach back signs and symptoms related to disease process for when to call 911? Yes   Is the patient/caregiver able to teach back the hierarchy of who to call/visit for symptoms/problems? PCP, Specialist, Home health nurse, Urgent Care, ED, 911 Yes   If the patient is a current smoker, are they able to teach back resources for cessation? Not a smoker   Week 1 call completed? Yes   Would this patient benefit from a Referral to Amb Social Work? No   Is the patient interested in additional calls from an  ambulatory ? No   Wrap up additional comments abalation 1/26   Call end time 1204            EMBER VÁSQUEZ - Registered Nurse

## 2024-01-06 LAB
QT INTERVAL: 410 MS
QT INTERVAL: 423 MS
QT INTERVAL: 424 MS
QT INTERVAL: 425 MS
QT INTERVAL: 429 MS
QT INTERVAL: 432 MS
QT INTERVAL: 449 MS
QT INTERVAL: 485 MS
QTC INTERVAL: 412 MS
QTC INTERVAL: 453 MS
QTC INTERVAL: 462 MS
QTC INTERVAL: 495 MS
QTC INTERVAL: 529 MS
QTC INTERVAL: 534 MS
QTC INTERVAL: 548 MS
QTC INTERVAL: 550 MS

## 2024-01-08 ENCOUNTER — OFFICE VISIT (OUTPATIENT)
Dept: SLEEP MEDICINE | Facility: CLINIC | Age: 59
End: 2024-01-08
Payer: COMMERCIAL

## 2024-01-08 VITALS
WEIGHT: 240 LBS | SYSTOLIC BLOOD PRESSURE: 129 MMHG | BODY MASS INDEX: 34.36 KG/M2 | HEIGHT: 70 IN | DIASTOLIC BLOOD PRESSURE: 94 MMHG | OXYGEN SATURATION: 97 % | HEART RATE: 65 BPM

## 2024-01-08 DIAGNOSIS — G47.33 SEVERE OBSTRUCTIVE SLEEP APNEA: Primary | ICD-10-CM

## 2024-01-08 DIAGNOSIS — I42.2 PRIMARY IDIOPATHIC HYPERTROPHIC CARDIOMYOPATHY: ICD-10-CM

## 2024-01-08 DIAGNOSIS — G47.8 NON-RESTORATIVE SLEEP: ICD-10-CM

## 2024-01-08 DIAGNOSIS — I25.2 HISTORY OF MI (MYOCARDIAL INFARCTION): ICD-10-CM

## 2024-01-08 DIAGNOSIS — I48.20 ATRIAL FIBRILLATION, CHRONIC: ICD-10-CM

## 2024-01-08 DIAGNOSIS — R06.83 SNORING: ICD-10-CM

## 2024-01-08 DIAGNOSIS — G47.10 HYPERSOMNIA: ICD-10-CM

## 2024-01-08 DIAGNOSIS — E66.9 OBESITY (BMI 30-39.9): ICD-10-CM

## 2024-01-08 PROCEDURE — 99204 OFFICE O/P NEW MOD 45 MIN: CPT | Performed by: FAMILY MEDICINE

## 2024-01-08 PROCEDURE — G0463 HOSPITAL OUTPT CLINIC VISIT: HCPCS

## 2024-01-08 RX ORDER — ZOLPIDEM TARTRATE 5 MG/1
TABLET ORAL
Qty: 1 TABLET | Refills: 0 | Status: SHIPPED | OUTPATIENT
Start: 2024-01-08

## 2024-01-08 NOTE — PROGRESS NOTES
[facility] New Patient/Consultation       Reason for Consultation: HANY      Patient Care Team:  Rhea Carlson MD as PCP - General (Family Medicine)  Cortez Navarro MD as Consulting Physician (Cardiology)  Rupinder Larsen MD as Consulting Physician (Sleep Medicine)      History of present illness:  Thank you for asking me to see your patient.  The patient is a 58 y.o. male with hypertension asthma arthritis history of MI presents today to establish care for HANY.  I reviewed copy of sleep study from January 2016 which showed PAP titration results; this report indicated baseline AHI of 37.4 events per hour and responsive to CPAP at 8 cm H2O.  Lowest SpO2 83%.  Patient reports not currently on PAP.  Sleep latency around 1 hour sleeps around 5 hours 1 nap per day no rotating shifts.  Reports hypersomnia nonrestorative sleep near accidents while driving due to sleepiness difficulty driving due to sleepiness snoring waking up coughing/choking morning headaches waking with dry mouth teeth grinding during sleep nocturia muscle weakness with extreme emotion restless sleep.  BMI 34.4.  Current machine over 5 years old.  Would like to get back into care.  Weight changes since last study.  MI since last study.  Atrial fibrillation as well; getting ablation done soon.      Social History: No tobacco alcohol caffeine or drug use    Allergies:  Latex    Family History: HANY no       Current Outpatient Medications:     albuterol sulfate  (90 Base) MCG/ACT inhaler, Inhale 2 puffs Every 4 (Four) Hours As Needed for Wheezing., Disp: , Rfl:     amLODIPine (NORVASC) 10 MG tablet, Take 1 tablet by mouth Daily for 90 days., Disp: 30 tablet, Rfl: 2    atenolol (TENORMIN) 25 MG tablet, Take 1 tablet by mouth Daily., Disp: , Rfl:     budesonide-formoterol (SYMBICORT) 160-4.5 MCG/ACT inhaler, 2 (Two) Times a Day., Disp: , Rfl:     dofetilide (TIKOSYN) 500 MCG capsule, Take 1 capsule by mouth Every 12 (Twelve) Hours for  "60 days., Disp: 60 capsule, Rfl: 1    LORazepam (ATIVAN) 1 MG tablet, Take 1 tablet by mouth Every Morning. And if needed 0.5 MG at night, prn, Disp: , Rfl:     nitroglycerin (NITROSTAT) 0.4 MG SL tablet, Place 1 tablet under the tongue Every 5 (Five) Minutes As Needed for Chest Pain (Only if SBP Greater Than 100). Take no more than 3 doses in 15 minutes., Disp: 30 tablet, Rfl: 12    pantoprazole (PROTONIX) 40 MG EC tablet, Take 1 tablet by mouth 2 (Two) Times a Day., Disp: 60 tablet, Rfl: 3    rivaroxaban (XARELTO) 20 MG tablet, Take 1 tablet by mouth Daily., Disp: , Rfl:     sucralfate (CARAFATE) 1 g tablet, Take 1 tablet by mouth 4 (Four) Times a Day., Disp: , Rfl:     zolpidem (Ambien) 5 MG tablet, Take 1/2 tab as needed for sleep at night of sleep study only. Do not use at home., Disp: 1 tablet, Rfl: 0    Vital Signs:    Vitals:    01/08/24 1100   BP: 129/94   BP Location: Left arm   Patient Position: Sitting   Pulse: 65   SpO2: 97%   Weight: 109 kg (240 lb)   Height: 177.8 cm (70\")      Body mass index is 34.44 kg/m².  Neck Circumference: 18 inches      REVIEW OF SYSTEMS.  Full review of systems available on the intake form which is scanned in the media tab.  The relevant positive are noted below  Daytime excessive sleepiness with [place] Sleepiness Scale :Total score: 15   Snoring  Anxiety depression dizziness chest pain irregular heartbeat      Physical exam:  Vitals:    01/08/24 1100   BP: 129/94   BP Location: Left arm   Patient Position: Sitting   Pulse: 65   SpO2: 97%   Weight: 109 kg (240 lb)   Height: 177.8 cm (70\")    Body mass index is 34.44 kg/m². Neck Circumference: 18 inches  HEENT: Head is atraumatic, normocephalic  Eyes: pupils are round equal and reacting to light and accommodation, conjunctiva normal  Throat: tongue normal  NECK:Neck Circumference: 18 inches  RESPIRATORY SYSTEM: Regular respirations  CARDIOVASULAR SYSTEM: Regular rate  EXTREMITES: No cyanosis, clubbing  NEUROLOGICAL SYSTEM: " Oriented x 3, no gross motor defects, gait normal      Impression:  1. Severe obstructive sleep apnea    2. Hypersomnia    3. Non-restorative sleep    4. Snoring    5. Obesity (BMI 30-39.9)    6. Atrial fibrillation, chronic    7. Primary idiopathic hypertrophic cardiomyopathy    8. History of MI (myocardial infarction)        Plan:    Good sleep hygiene measures should be maintained.  Weight loss would be beneficial in this patient who is obese BMI 34.4..    I discussed the pathophysiology of obstructive sleep apnea with the patient.  We discussed the adverse outcomes associated with untreated sleep-disordered breathing.  We discussed treatment modalities of obstructive sleep apnea including CPAP device. Sleep study will be scheduled to reassess severity of sleep disorder breathing.  Weight loss will be strongly beneficial in order to reduce the severity of sleep-disordered breathing. Caution during activities that require prolonged concentration is strongly advised.  Patient will be notified of sleep study results after sleep study is completed.  The patient is not opposed to treatment with CPAP device if we confirm significant obstructive sleep apnea on polysomnography. Prescription for Ambien was provided to bring to the Sleep lab to improve sleep efficiency.  Patient was asked to not take the Ambien at home.  Patient will not take Ativan on the night of sleep study as he is prescribed Ambien for sleep efficiency.  He expressed understanding and agreeable to plan.  If intolerant to CPAP, can consider discussing inspire device in the future.    Thank you for allowing me to participate in your patient's care.    Rupinder Larsen MD  Sleep Medicine  01/08/24  11:54 EST

## 2024-01-11 NOTE — DISCHARGE SUMMARY
[facility]    Date of Admission: 12/26/2023    Date of Discharge:  1/11/2024    Length of stay:  LOS: 3 days     Admission Diagnosis: Atrial fibrillation    Discharge Diagnosis: Same, status post Tikosyn loading followed by cardioversion.  Colonoscopy and EGD    Presenting Problem/History of Present Illness  Active Hospital Problems    Diagnosis  POA    **Persistent atrial fibrillation [I48.19]  Yes    Screening for malignant neoplasm of colon [Z12.11]  Not Applicable    Dysphagia [R13.10]  Unknown    Epigastric pain [R10.13]  Unknown    Dyspepsia [R10.13]  Unknown      Resolved Hospital Problems   No resolved problems to display.          Hospital Course  Gamaliel Vogt is a 58 y.o. male presented for elective Tikosyn loading for atrial fibrillation followed by cardioversion.  Unfortunately this was not successful most likely due to chronicity of atrial fibrillation.  Patient expressed desire to proceed with outpatient A-fib ablation and he is scheduled for January 26, 2024.  Patient was already scheduled to have a colonoscopy and EGD and these were performed while he was hospitalized for Tikosyn loading.      The discharge process took about 35 minutes during which we discussed about medication changes, details regarding upcoming A-fib ablation procedure.  The patient voiced understanding and all his  questions were answered to his  satisfaction.    Past Medical History:   Diagnosis Date    Abnormal ECG     Arrhythmia     Arthritis 12/11/2023    Asthma     Atrial fibrillation     Congenital heart disease     Coronary artery disease     Heart murmur     Hyperlipidemia     Hypertension     Mental disorder 1984    Pulmonary arterial hypertension     Sleep apnea     CPap      Past Surgical History:   Procedure Laterality Date    CARDIAC CATHETERIZATION Left 10/01/2023    Procedure: Left Heart Cath possible PCI;  Surgeon: Gemini Dyer MD;  [facility]: [facility] CATH INVASIVE LOC[facility];  Service: Cardiology;  Laterality:  Left;    CARDIAC CATHETERIZATION  10/1/2023    COLONOSCOPY N/A 12/29/2023    Procedure: COLONOSCOPY WITH POLYPECTOMY X4;  Surgeon: PHAN De León MD;  Location: [facility] ENDOSCOPY;  Service: Gastroenterology;  Laterality: N/A;  polyps, hemorrhoids    ENDOSCOPY N/A 12/29/2023    Procedure: ESOPHAGOGASTRODUODENOSCOPY WITH DILATION (BOUGIE 54, 58) AND BIOPSY X1 AREA;  Surgeon: PHAN De León MD;  Location: [facility] ENDOSCOPY;  Service: Gastroenterology;  Laterality: N/A;  hiatal hernia, r/o candida    RETINAL DETACHMENT SURGERY      TONSILLECTOMY  1973    VITRECTOMY PARS PLANA Left 08/11/2020    Procedure: VITRECTOMY PARS PLANA  25GA  WITH ENDOLASER AND  FLUID GAS EXCHANGE LEFT EYE;  Surgeon: Lazarus, Howard S., MD;  Location: [facility] MAIN OR;  Service: Ophthalmology;  Laterality: Left;     Social History     Socioeconomic History    Marital status:    Tobacco Use    Smoking status: Never     Passive exposure: Never    Smokeless tobacco: Never   Vaping Use    Vaping Use: Never used   Substance and Sexual Activity    Alcohol use: No    Drug use: Never    Sexual activity: Defer     Partners: Female     Birth control/protection: None       Procedures Performed: Tikosyn loading followed by cardioversion, EGD and colonoscopy       Pertinent Test Results:     Lab Results (last 72 hours)       Procedure Component Value Units Date/Time    Magnesium [[id number]]  (Normal) Collected: 12/29/23 0014    Specimen: Blood Updated: 12/29/23 0129     Magnesium 2.4 mg/dL     Comprehensive Metabolic Panel [[id number]] Collected: 12/29/23 0014    Specimen: Blood Updated: 12/29/23 0129     Glucose 91 mg/dL      BUN 11 mg/dL      Creatinine 0.88 mg/dL      Sodium 140 mmol/L      Potassium 3.8 mmol/L      Chloride 100 mmol/L      CO2 29.0 mmol/L      Calcium 9.7 mg/dL      Total Protein 7.5 g/dL      Albumin 4.4 g/dL      ALT (SGPT) 25 U/L      AST (SGOT) 27 U/L      Alkaline Phosphatase 74 U/L      Total Bilirubin 0.6 mg/dL       Globulin 3.1 gm/dL      A/G Ratio 1.4 g/dL      BUN/Creatinine Ratio 12.5     Anion Gap 11.0 mmol/L      eGFR 99.1 mL/min/1.73     Narrative:      GFR Normal >60  Chronic Kidney Disease <60  Kidney Failure <15      CBC & Differential [[id number]]  (Abnormal) Collected: 12/29/23 0014    Specimen: Blood Updated: 12/29/23 0108    Narrative:      The following orders were created for panel order CBC & Differential.  Procedure                               Abnormality         Status                     ---------                               -----------         ------                     CBC Auto Differential[[id number]]        Abnormal            Final result                 Please view results for these tests on the individual orders.    CBC Auto Differential [[id number]]  (Abnormal) Collected: 12/29/23 0014    Specimen: Blood Updated: 12/29/23 0108     WBC 8.40 10*3/mm3      RBC 5.24 10*6/mm3      Hemoglobin 16.0 g/dL      Hematocrit 47.5 %      MCV 90.6 fL      MCH 30.6 pg      MCHC 33.7 g/dL      RDW 13.9 %      RDW-SD 45.9 fl      MPV 9.8 fL      Platelets 217 10*3/mm3      Neutrophil % 60.6 %      Lymphocyte % 22.4 %      Monocyte % 11.9 %      Eosinophil % 3.9 %      Basophil % 1.2 %      Neutrophils, Absolute 5.10 10*3/mm3      Lymphocytes, Absolute 1.90 10*3/mm3      Monocytes, Absolute 1.00 10*3/mm3      Eosinophils, Absolute 0.30 10*3/mm3      Basophils, Absolute 0.10 10*3/mm3      nRBC 0.1 /100 WBC     Magnesium [[id number]]  (Normal) Collected: 12/27/23 2359    Specimen: Blood Updated: 12/28/23 0826     Magnesium 2.1 mg/dL     Basic Metabolic Panel [[id number]]  (Abnormal) Collected: 12/27/23 2359    Specimen: Blood Updated: 12/28/23 0044     Glucose 111 mg/dL      BUN 10 mg/dL      Creatinine 0.97 mg/dL      Sodium 141 mmol/L      Potassium 3.9 mmol/L      Chloride 104 mmol/L      CO2 30.0 mmol/L      Calcium 9.3 mg/dL      BUN/Creatinine Ratio 10.3     Anion Gap 7.0 mmol/L      eGFR 89.9 mL/min/1.73      Narrative:      GFR Normal >60  Chronic Kidney Disease <60  Kidney Failure <15      Extra Tubes [[id number]] Collected: 12/27/23 0743    Specimen: Blood, Venous Line Updated: 12/27/23 0845    Narrative:      The following orders were created for panel order Extra Tubes.  Procedure                               Abnormality         Status                     ---------                               -----------         ------                     Lavender Top[[id number]]                                     Final result                 Please view results for these tests on the individual orders.    Lavender Top [[id number]] Collected: 12/27/23 0743    Specimen: Blood Updated: 12/27/23 0845     Extra Tube hold for add-on     Comment: Auto resulted       Basic Metabolic Panel [[id number]]  (Abnormal) Collected: 12/27/23 0743    Specimen: Blood Updated: 12/27/23 0820     Glucose 171 mg/dL      BUN 10 mg/dL      Creatinine 0.92 mg/dL      Sodium 138 mmol/L      Potassium 4.0 mmol/L      Comment: Slight hemolysis detected by analyzer. Result may be falsely elevated.        Chloride 102 mmol/L      CO2 25.0 mmol/L      Calcium 9.1 mg/dL      BUN/Creatinine Ratio 10.9     Anion Gap 11.0 mmol/L      eGFR 95.8 mL/min/1.73     Narrative:      GFR Normal >60  Chronic Kidney Disease <60  Kidney Failure <15      Magnesium [[id number]]  (Normal) Collected: 12/26/23 2008    Specimen: Blood Updated: 12/26/23 2033     Magnesium 2.1 mg/dL     Basic Metabolic Panel [[id number]]  (Abnormal) Collected: 12/26/23 2008    Specimen: Blood Updated: 12/26/23 2033     Glucose 125 mg/dL      BUN 12 mg/dL      Creatinine 0.90 mg/dL      Sodium 141 mmol/L      Potassium 4.2 mmol/L      Comment: Slight hemolysis detected by analyzer. Result may be falsely elevated.        Chloride 104 mmol/L      CO2 25.0 mmol/L      Calcium 9.5 mg/dL      BUN/Creatinine Ratio 13.3     Anion Gap 12.0 mmol/L      eGFR 98.4 mL/min/1.73     Narrative:      GFR Normal  ">60  Chronic Kidney Disease <60  Kidney Failure <15              Condition on Discharge: Stable    Vital Signs  /84 (BP Location: Right arm, Patient Position: Sitting)   Pulse 95   Temp 97.5 °F (36.4 °C) (Oral)   Resp 15   Ht 177.8 cm (70\")   Wt 107 kg (236 lb 15.9 oz)   SpO2 97%   BMI 34.01 kg/m²     Physical Exam    Physical Exam  VITALS REVIEWED    General:      well developed, in no acute distress.    Head:      normocephalic and atraumatic.    Eyes:      PERRL/EOM intact, conjunctiva and sclera clear with out nystagmus.    Neck:      no masses, thyromegaly,  trachea central with normal respiratory effort and PMI displaced laterally  Lungs:      Clear  Heart:       Irregular rhythm  Msk:      no deformity or scoliosis noted of thoracic or lumbar spine.    Pulses:      pulses normal in all 4 extremities.    Extremities:       No lower extremity edema  Neurologic:      no focal deficits.   alert oriented x3  Skin:      intact without lesions or rashes.    Psych:      alert and cooperative; normal mood and affect; normal attention span and concentration.      Discharge Disposition  Home or Self Care    Discharge Medications     Discharge Medications        New Medications        Instructions Start Date   dofetilide 500 MCG capsule  Commonly known as: TIKOSYN   500 mcg, Oral, Every 12 Hours Scheduled             Changes to Medications        Instructions Start Date   amLODIPine 10 MG tablet  Commonly known as: NORVASC  What changed:   medication strength  how much to take   10 mg, Oral, Daily             Continue These Medications        Instructions Start Date   albuterol sulfate  (90 Base) MCG/ACT inhaler  Commonly known as: PROVENTIL HFA;VENTOLIN HFA;PROAIR HFA   2 puffs, Inhalation, Every 4 Hours PRN      atenolol 25 MG tablet  Commonly known as: TENORMIN   25 mg, Oral, Daily      budesonide-formoterol 160-4.5 MCG/ACT inhaler  Commonly known as: SYMBICORT   2 Times Daily - RT      LORazepam 1 " MG tablet  Commonly known as: ATIVAN   1 mg, Oral, Every Morning, And if needed 0.5 MG at night, prn      nitroglycerin 0.4 MG SL tablet  Commonly known as: NITROSTAT   0.4 mg, Sublingual, Every 5 Minutes PRN, Take no more than 3 doses in 15 minutes.      pantoprazole 40 MG EC tablet  Commonly known as: PROTONIX   40 mg, Oral, 2 Times Daily      rivaroxaban 20 MG tablet  Commonly known as: XARELTO   20 mg, Oral, Daily      sucralfate 1 g tablet  Commonly known as: CARAFATE   1 g, Oral, 4 Times Daily               Discharge Diet: Cardiac    Activity at Discharge: As tolerated    Follow-up Appointments  Future Appointments   Date Time Provider Department Center   3/5/2024  3:30 PM Yordy Millan MD [facility]   5/30/2024  3:15 PM Aki Pitts MD [facility]       Risk for Readmission (LACE) Score: 9 (12/29/2023  6:00 AM)      Cortez Navarro MD  01/11/24  17:58 EST

## 2024-01-17 ENCOUNTER — OFFICE (OUTPATIENT)
Dept: URBAN - METROPOLITAN AREA CLINIC 64 | Facility: CLINIC | Age: 59
End: 2024-01-17

## 2024-01-17 VITALS
DIASTOLIC BLOOD PRESSURE: 105 MMHG | HEART RATE: 70 BPM | HEIGHT: 70 IN | SYSTOLIC BLOOD PRESSURE: 148 MMHG | WEIGHT: 238 LBS

## 2024-01-17 DIAGNOSIS — R07.89 OTHER CHEST PAIN: ICD-10-CM

## 2024-01-17 DIAGNOSIS — R10.12 LEFT UPPER QUADRANT PAIN: ICD-10-CM

## 2024-01-17 DIAGNOSIS — K21.9 GASTRO-ESOPHAGEAL REFLUX DISEASE WITHOUT ESOPHAGITIS: ICD-10-CM

## 2024-01-17 DIAGNOSIS — R13.10 DYSPHAGIA, UNSPECIFIED: ICD-10-CM

## 2024-01-17 DIAGNOSIS — K59.00 CONSTIPATION, UNSPECIFIED: ICD-10-CM

## 2024-01-17 PROCEDURE — 99213 OFFICE O/P EST LOW 20 MIN: CPT | Performed by: NURSE PRACTITIONER

## 2024-01-17 RX ORDER — FAMOTIDINE 40 MG/1
40 TABLET, FILM COATED ORAL
Qty: 90 | Refills: 4 | Status: ACTIVE
Start: 2024-01-17

## 2024-01-24 ENCOUNTER — LAB (OUTPATIENT)
Dept: LAB | Facility: HOSPITAL | Age: 59
End: 2024-01-24
Payer: COMMERCIAL

## 2024-01-24 DIAGNOSIS — I48.19 ATRIAL FIBRILLATION, PERSISTENT: ICD-10-CM

## 2024-01-24 LAB
ALBUMIN SERPL-MCNC: 4.4 G/DL (ref 3.5–5.2)
ALBUMIN/GLOB SERPL: 1.6 G/DL
ALP SERPL-CCNC: 90 U/L (ref 39–117)
ALT SERPL W P-5'-P-CCNC: 41 U/L (ref 1–41)
ANION GAP SERPL CALCULATED.3IONS-SCNC: 10.6 MMOL/L (ref 5–15)
AST SERPL-CCNC: 27 U/L (ref 1–40)
BASOPHILS # BLD AUTO: 0.09 10*3/MM3 (ref 0–0.2)
BASOPHILS NFR BLD AUTO: 1.5 % (ref 0–1.5)
BILIRUB SERPL-MCNC: 0.5 MG/DL (ref 0–1.2)
BUN SERPL-MCNC: 15 MG/DL (ref 6–20)
BUN/CREAT SERPL: 12.3 (ref 7–25)
CALCIUM SPEC-SCNC: 9.8 MG/DL (ref 8.6–10.5)
CHLORIDE SERPL-SCNC: 104 MMOL/L (ref 98–107)
CO2 SERPL-SCNC: 27.4 MMOL/L (ref 22–29)
CREAT SERPL-MCNC: 1.22 MG/DL (ref 0.76–1.27)
DEPRECATED RDW RBC AUTO: 41.1 FL (ref 37–54)
EGFRCR SERPLBLD CKD-EPI 2021: 68.3 ML/MIN/1.73
EOSINOPHIL # BLD AUTO: 0.3 10*3/MM3 (ref 0–0.4)
EOSINOPHIL NFR BLD AUTO: 4.9 % (ref 0.3–6.2)
ERYTHROCYTE [DISTWIDTH] IN BLOOD BY AUTOMATED COUNT: 12.7 % (ref 12.3–15.4)
GLOBULIN UR ELPH-MCNC: 2.7 GM/DL
GLUCOSE SERPL-MCNC: 81 MG/DL (ref 65–99)
HCT VFR BLD AUTO: 44.3 % (ref 37.5–51)
HGB BLD-MCNC: 15.3 G/DL (ref 13–17.7)
IMM GRANULOCYTES # BLD AUTO: 0.09 10*3/MM3 (ref 0–0.05)
IMM GRANULOCYTES NFR BLD AUTO: 1.5 % (ref 0–0.5)
INR PPP: 1.25 (ref 0.93–1.1)
LYMPHOCYTES # BLD AUTO: 1.77 10*3/MM3 (ref 0.7–3.1)
LYMPHOCYTES NFR BLD AUTO: 28.8 % (ref 19.6–45.3)
MAGNESIUM SERPL-MCNC: 2.3 MG/DL (ref 1.6–2.6)
MCH RBC QN AUTO: 30.7 PG (ref 26.6–33)
MCHC RBC AUTO-ENTMCNC: 34.5 G/DL (ref 31.5–35.7)
MCV RBC AUTO: 89 FL (ref 79–97)
MONOCYTES # BLD AUTO: 0.75 10*3/MM3 (ref 0.1–0.9)
MONOCYTES NFR BLD AUTO: 12.2 % (ref 5–12)
NEUTROPHILS NFR BLD AUTO: 3.14 10*3/MM3 (ref 1.7–7)
NEUTROPHILS NFR BLD AUTO: 51.1 % (ref 42.7–76)
NRBC BLD AUTO-RTO: 0 /100 WBC (ref 0–0.2)
PLATELET # BLD AUTO: 266 10*3/MM3 (ref 140–450)
PMV BLD AUTO: 11.6 FL (ref 6–12)
POTASSIUM SERPL-SCNC: 4.6 MMOL/L (ref 3.5–5.2)
PROT SERPL-MCNC: 7.1 G/DL (ref 6–8.5)
PROTHROMBIN TIME: 13.4 SECONDS (ref 9.6–11.7)
RBC # BLD AUTO: 4.98 10*6/MM3 (ref 4.14–5.8)
SODIUM SERPL-SCNC: 142 MMOL/L (ref 136–145)
WBC NRBC COR # BLD AUTO: 6.14 10*3/MM3 (ref 3.4–10.8)

## 2024-01-24 PROCEDURE — 85025 COMPLETE CBC W/AUTO DIFF WBC: CPT

## 2024-01-24 PROCEDURE — 85610 PROTHROMBIN TIME: CPT

## 2024-01-24 PROCEDURE — 83735 ASSAY OF MAGNESIUM: CPT

## 2024-01-24 PROCEDURE — 80053 COMPREHEN METABOLIC PANEL: CPT

## 2024-01-24 PROCEDURE — 36415 COLL VENOUS BLD VENIPUNCTURE: CPT

## 2024-01-25 RX ORDER — FAMOTIDINE 40 MG/1
40 TABLET, FILM COATED ORAL NIGHTLY
COMMUNITY

## 2024-01-26 ENCOUNTER — ANESTHESIA EVENT (OUTPATIENT)
Dept: CARDIOLOGY | Facility: HOSPITAL | Age: 59
End: 2024-01-26
Payer: COMMERCIAL

## 2024-01-26 ENCOUNTER — HOSPITAL ENCOUNTER (OUTPATIENT)
Facility: HOSPITAL | Age: 59
Setting detail: HOSPITAL OUTPATIENT SURGERY
Discharge: HOME | End: 2024-01-26
Attending: INTERNAL MEDICINE | Admitting: INTERNAL MEDICINE
Payer: COMMERCIAL

## 2024-01-26 ENCOUNTER — ANESTHESIA (OUTPATIENT)
Dept: CARDIOLOGY | Facility: HOSPITAL | Age: 59
End: 2024-01-26
Payer: COMMERCIAL

## 2024-01-26 VITALS
TEMPERATURE: 97.5 F | WEIGHT: 235.89 LBS | SYSTOLIC BLOOD PRESSURE: 104 MMHG | BODY MASS INDEX: 33.77 KG/M2 | HEART RATE: 70 BPM | DIASTOLIC BLOOD PRESSURE: 63 MMHG | RESPIRATION RATE: 14 BRPM | HEIGHT: 70 IN | OXYGEN SATURATION: 98 %

## 2024-01-26 DIAGNOSIS — I48.19 ATRIAL FIBRILLATION, PERSISTENT: ICD-10-CM

## 2024-01-26 LAB
ACT BLD: 239 SECONDS (ref 89–137)
ACT BLD: 298 SECONDS (ref 89–137)
ACT BLD: 309 SECONDS (ref 89–137)
ACT BLD: 331 SECONDS (ref 89–137)
ACT BLD: 331 SECONDS (ref 89–137)
ACT BLD: 336 SECONDS (ref 89–137)
ACT BLD: 342 SECONDS (ref 89–137)
ACT BLD: 352 SECONDS (ref 89–137)
ACT BLD: 396 SECONDS (ref 89–137)

## 2024-01-26 PROCEDURE — 25010000002 SUGAMMADEX 200 MG/2ML SOLUTION: Performed by: ANESTHESIOLOGIST ASSISTANT

## 2024-01-26 PROCEDURE — C1894 INTRO/SHEATH, NON-LASER: HCPCS | Performed by: INTERNAL MEDICINE

## 2024-01-26 PROCEDURE — 25010000002 PROPOFOL 10 MG/ML EMULSION: Performed by: ANESTHESIOLOGIST ASSISTANT

## 2024-01-26 PROCEDURE — 25010000002 HEPARIN (PORCINE) PER 1000 UNITS: Performed by: ANESTHESIOLOGIST ASSISTANT

## 2024-01-26 PROCEDURE — 25810000003 SODIUM CHLORIDE 0.9 % SOLUTION: Performed by: INTERNAL MEDICINE

## 2024-01-26 PROCEDURE — 25810000003 SODIUM CHLORIDE 0.9 % SOLUTION: Performed by: ANESTHESIOLOGIST ASSISTANT

## 2024-01-26 PROCEDURE — C1730 CATH, EP, 19 OR FEW ELECT: HCPCS | Performed by: INTERNAL MEDICINE

## 2024-01-26 PROCEDURE — 93656 COMPRE EP EVAL ABLTJ ATR FIB: CPT | Performed by: INTERNAL MEDICINE

## 2024-01-26 PROCEDURE — 25010000002 GLYCOPYRROLATE 0.2 MG/ML SOLUTION: Performed by: ANESTHESIOLOGIST ASSISTANT

## 2024-01-26 PROCEDURE — C1769 GUIDE WIRE: HCPCS | Performed by: INTERNAL MEDICINE

## 2024-01-26 PROCEDURE — 25810000003 SODIUM CHLORIDE 0.9 % SOLUTION 250 ML FLEX CONT: Performed by: ANESTHESIOLOGIST ASSISTANT

## 2024-01-26 PROCEDURE — 25010000002 PROTAMINE SULFATE PER 10 MG: Performed by: ANESTHESIOLOGIST ASSISTANT

## 2024-01-26 PROCEDURE — C1766 INTRO/SHEATH,STRBLE,NON-PEEL: HCPCS | Performed by: INTERNAL MEDICINE

## 2024-01-26 PROCEDURE — 25010000002 HEPARIN (PORCINE) 25000-0.45 UT/250ML-% SOLUTION: Performed by: ANESTHESIOLOGIST ASSISTANT

## 2024-01-26 PROCEDURE — 25010000002 ONDANSETRON PER 1 MG: Performed by: ANESTHESIOLOGIST ASSISTANT

## 2024-01-26 PROCEDURE — C1732 CATH, EP, DIAG/ABL, 3D/VECT: HCPCS | Performed by: INTERNAL MEDICINE

## 2024-01-26 PROCEDURE — 25010000002 PHENYLEPHRINE 10 MG/ML SOLUTION 5 ML VIAL: Performed by: ANESTHESIOLOGIST ASSISTANT

## 2024-01-26 PROCEDURE — 93655 ICAR CATH ABLTJ DSCRT ARRHYT: CPT | Performed by: INTERNAL MEDICINE

## 2024-01-26 PROCEDURE — C1760 CLOSURE DEV, VASC: HCPCS | Performed by: INTERNAL MEDICINE

## 2024-01-26 PROCEDURE — 25010000002 FENTANYL CITRATE (PF) 50 MCG/ML SOLUTION: Performed by: ANESTHESIOLOGIST ASSISTANT

## 2024-01-26 PROCEDURE — 85347 COAGULATION TIME ACTIVATED: CPT

## 2024-01-26 PROCEDURE — 93657 TX L/R ATRIAL FIB ADDL: CPT | Performed by: INTERNAL MEDICINE

## 2024-01-26 PROCEDURE — C1759 CATH, INTRA ECHOCARDIOGRAPHY: HCPCS | Performed by: INTERNAL MEDICINE

## 2024-01-26 RX ORDER — HEPARIN SODIUM 1000 [USP'U]/ML
INJECTION, SOLUTION INTRAVENOUS; SUBCUTANEOUS AS NEEDED
Status: DISCONTINUED | OUTPATIENT
Start: 2024-01-26 | End: 2024-01-26 | Stop reason: SURG

## 2024-01-26 RX ORDER — HEPARIN SODIUM 10000 [USP'U]/100ML
INJECTION, SOLUTION INTRAVENOUS CONTINUOUS PRN
Status: DISCONTINUED | OUTPATIENT
Start: 2024-01-26 | End: 2024-01-26 | Stop reason: SURG

## 2024-01-26 RX ORDER — PROPOFOL 10 MG/ML
VIAL (ML) INTRAVENOUS AS NEEDED
Status: DISCONTINUED | OUTPATIENT
Start: 2024-01-26 | End: 2024-01-26 | Stop reason: SURG

## 2024-01-26 RX ORDER — ZOLPIDEM TARTRATE 10 MG/1
10 TABLET ORAL NIGHTLY PRN
Qty: 30 TABLET | Refills: 3 | Status: SHIPPED | OUTPATIENT
Start: 2024-01-26

## 2024-01-26 RX ORDER — SODIUM CHLORIDE 9 MG/ML
50 INJECTION, SOLUTION INTRAVENOUS CONTINUOUS
Status: DISCONTINUED | OUTPATIENT
Start: 2024-01-26 | End: 2024-01-26 | Stop reason: HOSPADM

## 2024-01-26 RX ORDER — FENTANYL CITRATE 50 UG/ML
INJECTION, SOLUTION INTRAMUSCULAR; INTRAVENOUS AS NEEDED
Status: DISCONTINUED | OUTPATIENT
Start: 2024-01-26 | End: 2024-01-26 | Stop reason: SURG

## 2024-01-26 RX ORDER — NITROGLYCERIN 0.4 MG/1
0.4 TABLET SUBLINGUAL
Status: DISCONTINUED | OUTPATIENT
Start: 2024-01-26 | End: 2024-01-26 | Stop reason: HOSPADM

## 2024-01-26 RX ORDER — ONDANSETRON 2 MG/ML
INJECTION INTRAMUSCULAR; INTRAVENOUS AS NEEDED
Status: DISCONTINUED | OUTPATIENT
Start: 2024-01-26 | End: 2024-01-26 | Stop reason: SURG

## 2024-01-26 RX ORDER — GLYCOPYRROLATE 0.2 MG/ML
INJECTION INTRAMUSCULAR; INTRAVENOUS AS NEEDED
Status: DISCONTINUED | OUTPATIENT
Start: 2024-01-26 | End: 2024-01-26 | Stop reason: SURG

## 2024-01-26 RX ORDER — COLCHICINE 0.6 MG/1
0.6 TABLET ORAL DAILY
Qty: 15 TABLET | Refills: 1 | Status: SHIPPED | OUTPATIENT
Start: 2024-01-26

## 2024-01-26 RX ORDER — PROTAMINE SULFATE 10 MG/ML
INJECTION, SOLUTION INTRAVENOUS AS NEEDED
Status: DISCONTINUED | OUTPATIENT
Start: 2024-01-26 | End: 2024-01-26 | Stop reason: SURG

## 2024-01-26 RX ORDER — SODIUM CHLORIDE 9 MG/ML
INJECTION, SOLUTION INTRAVENOUS CONTINUOUS PRN
Status: DISCONTINUED | OUTPATIENT
Start: 2024-01-26 | End: 2024-01-26 | Stop reason: SURG

## 2024-01-26 RX ORDER — ROCURONIUM BROMIDE 10 MG/ML
INJECTION, SOLUTION INTRAVENOUS AS NEEDED
Status: DISCONTINUED | OUTPATIENT
Start: 2024-01-26 | End: 2024-01-26 | Stop reason: SURG

## 2024-01-26 RX ADMIN — PROTAMINE SULFATE 50 MG: 10 INJECTION, SOLUTION INTRAVENOUS at 11:26

## 2024-01-26 RX ADMIN — HEPARIN SODIUM 5000 UNITS: 1000 INJECTION INTRAVENOUS; SUBCUTANEOUS at 09:11

## 2024-01-26 RX ADMIN — ONDANSETRON 4 MG: 2 INJECTION INTRAMUSCULAR; INTRAVENOUS at 11:26

## 2024-01-26 RX ADMIN — HEPARIN SODIUM 18 UNITS/KG/HR: 10000 INJECTION, SOLUTION INTRAVENOUS at 08:45

## 2024-01-26 RX ADMIN — SODIUM CHLORIDE 50 ML/HR: 9 INJECTION, SOLUTION INTRAVENOUS at 07:44

## 2024-01-26 RX ADMIN — SODIUM CHLORIDE: 9 INJECTION, SOLUTION INTRAVENOUS at 08:01

## 2024-01-26 RX ADMIN — LIDOCAINE HYDROCHLORIDE 50 MG: 20 INJECTION, SOLUTION EPIDURAL; INFILTRATION; INTRACAUDAL; PERINEURAL at 08:10

## 2024-01-26 RX ADMIN — PHENYLEPHRINE HYDROCHLORIDE 0.5 MCG/KG/MIN: 10 INJECTION INTRAVENOUS at 09:02

## 2024-01-26 RX ADMIN — HEPARIN SODIUM 19 UNITS/KG/HR: 10000 INJECTION, SOLUTION INTRAVENOUS at 09:04

## 2024-01-26 RX ADMIN — HEPARIN SODIUM 2000 UNITS: 1000 INJECTION INTRAVENOUS; SUBCUTANEOUS at 10:18

## 2024-01-26 RX ADMIN — HEPARIN SODIUM 3000 UNITS: 1000 INJECTION INTRAVENOUS; SUBCUTANEOUS at 09:25

## 2024-01-26 RX ADMIN — HEPARIN SODIUM 3000 UNITS: 1000 INJECTION INTRAVENOUS; SUBCUTANEOUS at 09:55

## 2024-01-26 RX ADMIN — SUGAMMADEX 200 MG: 100 INJECTION, SOLUTION INTRAVENOUS at 11:34

## 2024-01-26 RX ADMIN — HEPARIN SODIUM 8000 UNITS: 1000 INJECTION INTRAVENOUS; SUBCUTANEOUS at 08:44

## 2024-01-26 RX ADMIN — HEPARIN SODIUM 21 UNITS/KG/HR: 10000 INJECTION, SOLUTION INTRAVENOUS at 09:11

## 2024-01-26 RX ADMIN — HEPARIN SODIUM 5000 UNITS: 1000 INJECTION INTRAVENOUS; SUBCUTANEOUS at 09:02

## 2024-01-26 RX ADMIN — ROCURONIUM BROMIDE 50 MG: 10 INJECTION, SOLUTION INTRAVENOUS at 08:10

## 2024-01-26 RX ADMIN — GLYCOPYRROLATE 0.2 MG: 0.2 INJECTION, SOLUTION INTRAMUSCULAR; INTRAVENOUS at 11:28

## 2024-01-26 RX ADMIN — PROPOFOL 200 MG: 10 INJECTION, EMULSION INTRAVENOUS at 08:10

## 2024-01-26 RX ADMIN — HEPARIN SODIUM 3000 UNITS: 1000 INJECTION INTRAVENOUS; SUBCUTANEOUS at 09:40

## 2024-01-26 RX ADMIN — FENTANYL CITRATE 100 MCG: 50 INJECTION, SOLUTION INTRAMUSCULAR; INTRAVENOUS at 08:01

## 2024-01-26 NOTE — DISCHARGE INSTRUCTIONS
[facility]  Cardiology  [facility] [place] [address] [phone]    Coronary Ablation After Care    Refer to this sheet in the next few weeks. These instructions provide you with information on caring for yourself after your procedure. Your health care provider may also give you more specific instructions. Your treatment has been planned according to current medical practices, but problems sometimes occur. Call your health care provider if you have any problems or questions after your procedure.      What to Expect After the Procedure:  After your procedure, it is typical to have the following sensations:  Minor discomfort or tenderness and a small bump at the catheter insertion site(s). The bump(s) should usually decrease in size and tenderness within 1 to 2 weeks.  Any bruising will usually fade within 2 to 4 weeks.  Home Care Instructions:  Do not apply powder or lotion to the site.  Do not take baths, swim, or use a hot tub until your health care provider approves and the site is completely healed.  Do not bend, squat, or lift anything over 20 lb (9 kg) or as directed by your health care provider. However, we recommend lifting nothing heavier than a gallon of milk.    You may shower 24 hours after the procedure. Remove the bandage (dressing) and gently wash the site with plain soap and water. Gently pat the site dry. You may apply a band aid daily for 2 days if desired.    Inspect the site at least twice daily.  Increase your fluid intake for the next 2 days.    Limit your activity for the first 48 hours.   Avoid strenuous activity for 1 week or as advised by your physician.    Follow instructions about when you can drive or return to work as directed by your physician.    Hold direct pressure over the site when you cough, sneeze, laugh or change positions.  Do this for the next 2 days.    Call Your Doctor If:  You have drainage (other than a small amount of blood on the dressing).  You  have chills or a fever > 101.  You have redness, warmth, swelling (larger than a walnut), or pain at the insertion   You develop palpitations, chest pain or shortness of breath, feel faint, or pass out.  You develop pain, discoloration, coldness, numbness, tingling, or severe bruising in the leg that held the catheter.  You develop bleeding from any other place, such as the bowels.  You have heavy bleeding from the site.  If this happens, lie down on a flat surface, such as a bed or couch, and hold pressure on the site for 20 minutes. If the bleeding stops, apply a fresh bandage and continue to lie down for one hour. If the bleeding continues, keep holding pressure at the site and call 911.      Make Sure You:  Understand these instructions.  Will watch your condition.

## 2024-01-26 NOTE — ANESTHESIA POSTPROCEDURE EVALUATION
Patient: Gamaliel Vogt    Procedure Summary       Date: 01/26/24 Room / Location: [facility] CATH LAB 3 / [facility] CATH INVASIVE LOCATION    Anesthesia Start: 0800 Anesthesia Stop: 1148    Procedure: Ablation atrial fibrillation/aflutter, rep emailed (Right: Groin) Diagnosis:       Atrial fibrillation, persistent      (atrial fibrillation, sick sinus syndrome)    Providers: Cortez Navarro MD Provider: Ed Mason MD    Anesthesia Type: general ASA Status: 3            Anesthesia Type: general    Vitals  Vitals Value Taken Time   BP 87/59 01/26/24 1251   Temp 97.5 °F (36.4 °C) 01/26/24 1150   Pulse 50 01/26/24 1300   Resp 14 01/26/24 1235   SpO2 100 % 01/26/24 1300   Vitals shown include unfiled device data.        Post Anesthesia Care and Evaluation    Patient location during evaluation: PACU  Patient participation: complete - patient participated  Level of consciousness: awake  Pain score: 0  Pain management: adequate  Anesthetic complications: No anesthetic complications  PONV Status: none  Cardiovascular status: acceptable  Respiratory status: acceptable  Hydration status: acceptable

## 2024-01-26 NOTE — ANESTHESIA PROCEDURE NOTES
Airway  Date/Time: 1/26/2024 8:11 AM    Indications and Patient Condition  Indications for airway management: airway protection    Preoxygenated: yes  MILS maintained throughout  Mask difficulty assessment: 0 - not attempted    Final Airway Details  Final airway type: endotracheal airway      Successful airway: ETT     Successful intubation technique: video laryngoscopy  Facilitating devices/methods: intubating stylet  Endotracheal tube insertion site: oral  Blade: Cline  ETT size (mm): 7.5  Cormack-Lehane Classification: grade I - full view of glottis  Measured from: teeth  ETT/EBT  to teeth (cm): 22  Number of attempts at approach: 1  Assessment: lips, teeth, and gum same as pre-op and atraumatic intubation

## 2024-01-26 NOTE — ANESTHESIA PREPROCEDURE EVALUATION
Anesthesia Evaluation     Patient summary reviewed and Nursing notes reviewed   NPO Solid Status: > 8 hours             Airway   Mallampati: II  TM distance: >3 FB  Neck ROM: full  No difficulty expected  Dental - normal exam     Pulmonary - negative pulmonary ROS and normal exam   (+) ,sleep apnea  Cardiovascular - negative cardio ROS and normal exam    ECG reviewed    (+) hypertension, CAD, dysrhythmias, hyperlipidemia      Neuro/Psych- negative ROS  GI/Hepatic/Renal/Endo - negative ROS   (+) hiatal hernia    Musculoskeletal (-) negative ROS    Abdominal  - normal exam    Bowel sounds: normal.   Substance History - negative use     OB/GYN negative ob/gyn ROS         Other   arthritis,                 Anesthesia Plan    ASA 3     general       Anesthetic plan, risks, benefits, and alternatives have been provided, discussed and informed consent has been obtained with: patient.    CODE STATUS:

## 2024-01-29 ENCOUNTER — HOSPITAL ENCOUNTER (OUTPATIENT)
Dept: SLEEP MEDICINE | Facility: HOSPITAL | Age: 59
Discharge: HOME | End: 2024-01-29
Admitting: FAMILY MEDICINE
Payer: COMMERCIAL

## 2024-01-29 DIAGNOSIS — E66.9 OBESITY (BMI 30-39.9): ICD-10-CM

## 2024-01-29 DIAGNOSIS — I48.20 ATRIAL FIBRILLATION, CHRONIC: ICD-10-CM

## 2024-01-29 DIAGNOSIS — G47.8 NON-RESTORATIVE SLEEP: ICD-10-CM

## 2024-01-29 DIAGNOSIS — R06.83 SNORING: ICD-10-CM

## 2024-01-29 DIAGNOSIS — G47.10 HYPERSOMNIA: ICD-10-CM

## 2024-01-29 DIAGNOSIS — G47.33 SEVERE OBSTRUCTIVE SLEEP APNEA: ICD-10-CM

## 2024-01-29 DIAGNOSIS — I25.2 HISTORY OF MI (MYOCARDIAL INFARCTION): ICD-10-CM

## 2024-01-29 DIAGNOSIS — I42.2 PRIMARY IDIOPATHIC HYPERTROPHIC CARDIOMYOPATHY: ICD-10-CM

## 2024-01-29 PROCEDURE — 95811 POLYSOM 6/>YRS CPAP 4/> PARM: CPT

## 2024-01-30 VITALS — WEIGHT: 235.89 LBS | BODY MASS INDEX: 33.77 KG/M2 | HEIGHT: 70 IN

## 2024-01-31 ENCOUNTER — TELEPHONE (OUTPATIENT)
Dept: CARDIOLOGY | Facility: CLINIC | Age: 59
End: 2024-01-31
Payer: COMMERCIAL

## 2024-02-01 ENCOUNTER — OFFICE (OUTPATIENT)
Dept: URBAN - METROPOLITAN AREA CLINIC 51 | Facility: CLINIC | Age: 59
End: 2024-02-01
Payer: COMMERCIAL

## 2024-02-01 DIAGNOSIS — I48.20 ATRIAL FIBRILLATION, CHRONIC: ICD-10-CM

## 2024-02-01 DIAGNOSIS — R07.89 OTHER CHEST PAIN: ICD-10-CM

## 2024-02-01 DIAGNOSIS — E66.9 OBESITY (BMI 30-39.9): ICD-10-CM

## 2024-02-01 DIAGNOSIS — G47.8 NON-RESTORATIVE SLEEP: ICD-10-CM

## 2024-02-01 DIAGNOSIS — R06.83 SNORING: ICD-10-CM

## 2024-02-01 DIAGNOSIS — G47.10 HYPERSOMNIA: ICD-10-CM

## 2024-02-01 DIAGNOSIS — I25.2 HISTORY OF MI (MYOCARDIAL INFARCTION): ICD-10-CM

## 2024-02-01 DIAGNOSIS — R10.12 LEFT UPPER QUADRANT PAIN: ICD-10-CM

## 2024-02-01 DIAGNOSIS — R13.10 DYSPHAGIA, UNSPECIFIED: ICD-10-CM

## 2024-02-01 DIAGNOSIS — I42.2 PRIMARY IDIOPATHIC HYPERTROPHIC CARDIOMYOPATHY: ICD-10-CM

## 2024-02-01 DIAGNOSIS — K21.9 GASTRO-ESOPHAGEAL REFLUX DISEASE WITHOUT ESOPHAGITIS: ICD-10-CM

## 2024-02-01 DIAGNOSIS — G47.33 SEVERE OBSTRUCTIVE SLEEP APNEA: Primary | ICD-10-CM

## 2024-02-01 PROCEDURE — 91010 ESOPHAGUS MOTILITY STUDY: CPT | Performed by: INTERNAL MEDICINE

## 2024-02-01 PROCEDURE — 91037 ESOPH IMPED FUNCTION TEST: CPT | Mod: 59 | Performed by: INTERNAL MEDICINE

## 2024-02-20 RX ORDER — PANTOPRAZOLE SODIUM 40 MG/1
40 TABLET, DELAYED RELEASE ORAL 2 TIMES DAILY
Qty: 60 TABLET | Refills: 0 | Status: SHIPPED | OUTPATIENT
Start: 2024-02-20

## 2024-02-20 RX ORDER — COLCHICINE 0.6 MG/1
0.6 TABLET ORAL DAILY
Qty: 15 TABLET | Refills: 0 | OUTPATIENT
Start: 2024-02-20

## 2024-02-20 NOTE — TELEPHONE ENCOUNTER
Rx Refill Note  Requested Prescriptions     Pending Prescriptions Disp Refills    colchicine 0.6 MG tablet [Pharmacy Med Name: COLCHICINE 0.6 MG TABLET] 15 tablet 0     Sig: TAKE 1 TABLET BY MOUTH EVERY DAY      Last office visit with prescribing clinician: 12/19/2023   Last telemedicine visit with prescribing clinician: Visit date not found   Next office visit with prescribing clinician: Visit date not found                         Would you like a call back once the refill request has been completed: [] Yes [] No    If the office needs to give you a call back, can they leave a voicemail: [] Yes [] No    Bhargavi Turner MA  02/20/24, 12:59 EST

## 2024-03-19 RX ORDER — AMLODIPINE BESYLATE 10 MG/1
10 TABLET ORAL DAILY
Qty: 30 TABLET | Refills: 2 | Status: SHIPPED | OUTPATIENT
Start: 2024-03-19 | End: 2024-06-17

## 2024-03-19 RX ORDER — PANTOPRAZOLE SODIUM 40 MG/1
40 TABLET, DELAYED RELEASE ORAL 2 TIMES DAILY
Qty: 60 TABLET | Refills: 0 | Status: SHIPPED | OUTPATIENT
Start: 2024-03-19

## 2024-03-19 NOTE — TELEPHONE ENCOUNTER
Rx Refill Note  Requested Prescriptions     Pending Prescriptions Disp Refills    amLODIPine (NORVASC) 10 MG tablet 30 tablet 2     Sig: Take 1 tablet by mouth Daily for 90 days.      Last office visit with prescribing clinician: 12/19/23 Dr. Navarro  Last telemedicine visit with prescribing clinician: Visit date not found   Next office visit with prescribing clinician:                         Would you like a call back once the refill request has been completed: [] Yes [] No    If the office needs to give you a call back, can they leave a voicemail: [] Yes [] No    Sara Borjas MA  03/19/24, 14:21 EDT

## 2024-04-15 RX ORDER — PANTOPRAZOLE SODIUM 40 MG/1
40 TABLET, DELAYED RELEASE ORAL 2 TIMES DAILY
Qty: 60 TABLET | Refills: 0 | Status: SHIPPED | OUTPATIENT
Start: 2024-04-15

## 2024-04-17 RX ORDER — AMLODIPINE BESYLATE 10 MG/1
10 TABLET ORAL DAILY
Qty: 30 TABLET | Refills: 2 | Status: SHIPPED | OUTPATIENT
Start: 2024-04-17 | End: 2024-07-16

## 2024-04-17 NOTE — TELEPHONE ENCOUNTER
Caller: Gamaliel Vogt KEVIN    Relationship: Self    Best call back number:  [phone]    Requested Prescriptions:   Requested Prescriptions     Pending Prescriptions Disp Refills    amLODIPine (NORVASC) 10 MG tablet 30 tablet 2     Sig: Take 1 tablet by mouth Daily for 90 days.        Pharmacy where request should be sent: [facility] [address] [phone] [facility] [phone] FX     Last office visit with prescribing clinician: 11/30/2023   Last telemedicine visit with prescribing clinician: Visit date not found   Next office visit with prescribing clinician: 5/30/2024     Additional details provided by patient: PATIENT WAS UPPED DOSAGE IN THE HOSPITAL. REQUESTING 90 DAYS .     Does the patient have less than a 3 day supply:  [x] Yes  [] No    Would you like a call back once the refill request has been completed: [] Yes [] No    If the office needs to give you a call back, can they leave a voicemail: [] Yes [] No    Alejandro Easley Rep   04/17/24 08:35 EDT

## 2024-04-22 ENCOUNTER — OFFICE VISIT (OUTPATIENT)
Dept: SLEEP MEDICINE | Facility: CLINIC | Age: 59
End: 2024-04-22
Payer: COMMERCIAL

## 2024-04-22 VITALS
DIASTOLIC BLOOD PRESSURE: 88 MMHG | HEART RATE: 67 BPM | OXYGEN SATURATION: 96 % | SYSTOLIC BLOOD PRESSURE: 124 MMHG | WEIGHT: 235 LBS | BODY MASS INDEX: 33.64 KG/M2 | HEIGHT: 70 IN

## 2024-04-22 DIAGNOSIS — E66.9 OBESITY (BMI 30-39.9): ICD-10-CM

## 2024-04-22 DIAGNOSIS — G47.33 SEVERE OBSTRUCTIVE SLEEP APNEA: Primary | ICD-10-CM

## 2024-04-22 PROCEDURE — 99213 OFFICE O/P EST LOW 20 MIN: CPT | Performed by: FAMILY MEDICINE

## 2024-04-22 PROCEDURE — G0463 HOSPITAL OUTPT CLINIC VISIT: HCPCS

## 2024-04-22 NOTE — PROGRESS NOTES
"Follow Up [facility] Note     Chief Complaint:  HANY     Primary Care Physician: Jason Witt    Interval History:   The patient is a 58 y.o. male  who was last seen in the sleep lab: 1/29/2024 for split study which showed AHI 33.9 lowest SpO2 86%.  Optimal control was not achieved during titration portion of split study.  I recommended auto BiPAP.  Presents today for first follow-up since starting PAP machine.  Today reports improvement since last visit.  Increased usage over the past 2 weeks.    Downloaded PAP Data Reviewed For Compliance:  DME is [facility].  Downloads between 3/23/2024 - 4/21/2024.  Average usage is 3 hours 17 minutes.  Average AHI is 2.8.  Average auto BiPAP pressure is 16.6/12.6 cm H2O    I have reviewed the above results and compared them with the patient's last downloads and reviewed with the patient.    Review of Systems:    A complete review of systems was done and all were negative with the exception of anxiety depression acid reflux wheezing dry mouth    Social History:    Social History     Socioeconomic History    Marital status:    Tobacco Use    Smoking status: Never     Passive exposure: Never    Smokeless tobacco: Never   Vaping Use    Vaping status: Never Used   Substance and Sexual Activity    Alcohol use: No    Drug use: Never    Sexual activity: Defer     Partners: Female     Birth control/protection: None       Allergies:  Latex and Wound dressing adhesive     Medication Review:  Reviewed.      Vital Signs:    Vitals:    04/22/24 1100   BP: 124/88   BP Location: Left arm   Patient Position: Sitting   Pulse: 67   SpO2: 96%   Weight: 107 kg (235 lb)   Height: 177.8 cm (70\")     Body mass index is 33.72 kg/m².    Physical Exam:    Constitutional:  Well developed 58 y.o. male that appears in no apparent distress.  Awake & oriented times 3.  Normal mood with normal recent and remote memory and normal judgement.  Eyes:  Conjunctivae normal.  Oropharynx: " Previously, moist mucous membranes.    Self-administered [place] Sleepiness Scale test results: 7  0-5 Lower normal daytime sleepiness  6-10 Higher normal daytime sleepiness  11-12 Mild, 13-15 Moderate, & 16-24 Severe excessive daytime sleepiness    Impression:   1. Severe obstructive sleep apnea    2. Obesity (BMI 30-39.9)        Obstructive sleep apnea adequately treated with auto BiPAP. The patient appears to be at goal with good compliance and usage. The patient has no complaints of hypersomnolence.    Plan:  Good sleep hygiene measures should be maintained.  Weight loss would be beneficial in this patient who obese by Body mass index is 33.72 kg/m²..      After evaluating the patient and assessing results available, the patient is benefiting from the treatment being provided.     The patient will continue auto BiPAP.  After clinical evaluation and review of downloads, I recommend no changes to the patient's pressures.  A new prescription will be sent to the patient's DME.    Caution during activities that require prolonged concentration is strongly advised if sleepiness returns. Changing of PAP supplies regularly is important for effective use. Patient needs to change cushion on the mask or plugs on nasal pillows along with disposable filters once every month and change mask frame, tubing, headgear and Velcro straps every 6 months at the minimum.    I answered all of the patient's questions.  The patient will call for any problems and will follow up in 3 months.      Rupinder Larsen MD  Sleep Medicine  04/22/24  13:22 EDT

## 2024-04-29 ENCOUNTER — TELEPHONE (OUTPATIENT)
Dept: CARDIOLOGY | Facility: CLINIC | Age: 59
End: 2024-04-29
Payer: COMMERCIAL

## 2024-04-29 RX ORDER — AMLODIPINE BESYLATE 10 MG/1
10 TABLET ORAL DAILY
Qty: 30 TABLET | Refills: 2 | Status: SHIPPED | OUTPATIENT
Start: 2024-04-29 | End: 2024-07-28

## 2024-04-29 NOTE — TELEPHONE ENCOUNTER
Caller: Gregg Vogtsunny BROWN    Relationship: Self    Best call back number: [phone]    Requested Prescriptions:   Requested Prescriptions     Pending Prescriptions Disp Refills    amLODIPine (NORVASC) 10 MG tablet 30 tablet 2     Sig: Take 1 tablet by mouth Daily for 90 days.        Pharmacy where request should be sent: [facility] [address] [phone] [facility] [phone] FX     Last office visit with prescribing clinician: 11/30/2023   Last telemedicine visit with prescribing clinician: Visit date not found   Next office visit with prescribing clinician: 5/30/2024     *PT ASKED FOR 90 DAY PRESCRIPTION*    Does the patient have less than a 3 day supply:  [] Yes  [x] No    Would you like a call back once the refill request has been completed: [] Yes [x] No    If the office needs to give you a call back, can they leave a voicemail: [] Yes [x] No    Alejandro Zuniga Rep   04/29/24 09:16 EDT

## 2024-04-29 NOTE — TELEPHONE ENCOUNTER
Caller: Gamaliel Vogt    Relationship: Self    Best call back number: [phone]    What form or medical record are you requesting: DISABILITY PAPERWORK    Who is requesting this form or medical record from you: JOB    How would you like to receive the form or medical records (pick-up, mail, fax): FAX  If fax, what is the fax number: [phone]    Timeframe paperwork needed: ASAP    Additional notes: PT IS CALLING TO SEE IF WE CAN FILL OUT THE DISABILITY PAPERWORK ON FILE AND SEND IT BACK ASAP. HE SAID THIS IS THE LAST WEEK THAT HE'LL BE RECEIVING DSABILITY WITHOUT THE PAPERWORK.

## 2024-04-30 RX ORDER — PANTOPRAZOLE SODIUM 40 MG/1
40 TABLET, DELAYED RELEASE ORAL 2 TIMES DAILY
Qty: 60 TABLET | Refills: 0 | Status: SHIPPED | OUTPATIENT
Start: 2024-04-30

## 2024-05-08 ENCOUNTER — OFFICE (OUTPATIENT)
Dept: URBAN - METROPOLITAN AREA CLINIC 64 | Facility: CLINIC | Age: 59
End: 2024-05-08

## 2024-05-08 VITALS
HEART RATE: 65 BPM | WEIGHT: 244 LBS | DIASTOLIC BLOOD PRESSURE: 89 MMHG | HEIGHT: 70 IN | SYSTOLIC BLOOD PRESSURE: 138 MMHG

## 2024-05-08 DIAGNOSIS — R13.10 DYSPHAGIA, UNSPECIFIED: ICD-10-CM

## 2024-05-08 DIAGNOSIS — K21.9 GASTRO-ESOPHAGEAL REFLUX DISEASE WITHOUT ESOPHAGITIS: ICD-10-CM

## 2024-05-08 DIAGNOSIS — K59.00 CONSTIPATION, UNSPECIFIED: ICD-10-CM

## 2024-05-08 DIAGNOSIS — R10.12 LEFT UPPER QUADRANT PAIN: ICD-10-CM

## 2024-05-08 PROCEDURE — 99213 OFFICE O/P EST LOW 20 MIN: CPT | Performed by: INTERNAL MEDICINE

## 2024-05-08 RX ORDER — SUCRALFATE 1 G/1
TABLET ORAL
Qty: 120 | Refills: 5 | Status: COMPLETED
End: 2024-05-08

## 2024-05-08 RX ORDER — FAMOTIDINE 40 MG/1
40 TABLET, FILM COATED ORAL
Qty: 90 | Refills: 4 | Status: ACTIVE
Start: 2024-01-17

## 2024-05-30 ENCOUNTER — OFFICE VISIT (OUTPATIENT)
Dept: CARDIOLOGY | Facility: CLINIC | Age: 59
End: 2024-05-30
Payer: COMMERCIAL

## 2024-05-30 VITALS
HEIGHT: 70 IN | DIASTOLIC BLOOD PRESSURE: 85 MMHG | WEIGHT: 248 LBS | HEART RATE: 67 BPM | BODY MASS INDEX: 35.5 KG/M2 | SYSTOLIC BLOOD PRESSURE: 127 MMHG | RESPIRATION RATE: 18 BRPM

## 2024-05-30 DIAGNOSIS — I48.19 ATRIAL FIBRILLATION, PERSISTENT: Primary | ICD-10-CM

## 2024-05-30 RX ORDER — LOSARTAN POTASSIUM 25 MG/1
25 TABLET ORAL DAILY
Qty: 90 TABLET | Refills: 1 | Status: SHIPPED | OUTPATIENT
Start: 2024-05-30

## 2024-05-30 RX ORDER — AMLODIPINE BESYLATE 2.5 MG/1
2.5 TABLET ORAL DAILY
Qty: 30 TABLET | Refills: 11 | Status: SHIPPED | OUTPATIENT
Start: 2024-05-30

## 2024-06-04 NOTE — PROGRESS NOTES
Cardiology Clinic Note  Aki Pitts MD, PhD    Subjective:     Encounter Date:05/30/2024      Patient ID: Gamaliel Vogt is a 58 y.o. male.    Chief Complaint:  Chief Complaint   Patient presents with    Follow-up       HPI:         I the pleasure to see this very pleasant 58-year-old gentleman today as a new patient with history of MI in the past, essential hypertension, hyperlipidemia, paroxysmal atrial fibrillation, history of cardiac murmur, last stress test in September 2023 and echo in August 2023.  Has a family history of CAD in his brother with MI and underlying cardiomyopathy.  He is a non-smoker and he is not diabetic.  He had a cardiac catheterization October 2023 secondary to abnormal stress which reported decreased counts of moderate size in the basal inferior and inferolateral wall EF 59%, mild reversibility suggestive of possible ischemia.,  Heart cath revealed nonobstructive atherosclerotic disease with very slow flow possible indicative of microvascular dysfunction.  Coronaries were large with mild ectasia in the proximal RCA, very large caliber circumflex which appeared codominant, LAD had 1 diagonal branch and numerous septal perforators and barely reached the apex with a sizable PDA from the RCA.  There was only mild luminal irregularities less than 20% throughout.  EDP was elevated at 14.  EF 55% with rxft-np-pyms variability with mild concentric LVH, left atrium was severely enlarged with mild to moderate mitral valve regurgitation posteriorly, mild TX, mild TR with normal pulmonary pressures estimated noninvasively, normal aortic valve, likely grade 2 diastolic dysfunction, RV size and function was normal, IVC was normal     On last clinic visit he had main complaint is some chest discomfort shortness of breath and epigastric pain.  We reviewed his echo as well as his heart cath and demonstrated no obstructive disease in the coronaries to explain discomfort.  We did demonstrate his slow  "flow which may be indicative microvascular function or high LV filling pressures and diastolic dysfunction as well as his atrial fibrillation would be to be variability which was also present.  He underwent efforts at ablation in January which was ineffective with returning to atrial fibrillation quickly and is undergoing rhythm control strategy along with anticoagulation with Xarelto for stroke risk reduction.  He is having lower extremity edema likely secondary to his amlodipine and high-dose which was increased and we discussed modification of adding ARB therapy and decreasing his calcium channel blocker accordingly.  He has no chest pain shortness of breath or unstable symptoms at this time    Review of systems otherwise negative x 14 point review of systems except as mentioned above  Historical data copied forward from previous encounters in EMR including the history, exam, and assessment/plan has been reviewed and is unchanged unless noted otherwise.     Cardiac medicines reviewed with risk, benefits, and necessity of each discussed.     Risk and benefit of cardiac testing reviewed including death heart attack stroke pain bleeding infection need for vascular /cardiovascular surgery were discussed and the patient      Objective:         Objective  /90 (BP Location: Left arm, Patient Position: Sitting)   Pulse 74   Resp 18   Ht 177.8 cm (70\")   Wt 110 kg (242 lb)   BMI 34.72 kg/m²      Physical Exam  Irregularly irregular, no rubs gallops heave or lift, 2 out of 6 systolic murmur at the apex consistent with MR  No clubbing cyanosis, trace edema only  Normal pulses normal cap refill  Skin is warm and dry  Normocephalic/atraumatic pupils equal round  No carotid bruits  Intact grossly  Obese soft nontender nondistended  Assessment:      Independently manage medical conditions today     Essential hypertension  Hyperlipidemia  Permanent atrial fibrillation  Nonobstructive CAD  Family history of " "CAD  Obesity BMI greater than 34  Shortness of breath and dyspnea on exertion  Epigastric pain  Mild LVH     Continue atenolol, amlodipine, continue Xarelto for stroke risk reduction [id number] score 3    Permanent atrial fibrillation, status post ablation January 2024 after cardioversion, he has not been able to maintain sinus rhythm, continue rate control efforts, anticoagulation     Blood pressure stable however with peripheral edema likely secondary to amlodipine at high dose  Decreased amlodipine, started losartan     Sleep medicine referral, sleep study     CT abdomen with no specific vascular findings, small esophageal hiatal hernia, L5 spondylosis with spondylolisthesis, otherwise large organs normal, small bilateral renal cysts, 2 mm nonobstructing stone left lower renal pole with no hydronephrosis    Non-smoker  Not diabetic    Primary secondary prevention discussed  Diet and exercise per AHA guidelines  Follow-up labs       Back to clinic in 30 days    Objective:         /85 (BP Location: Left arm, Patient Position: Sitting)   Pulse 67   Resp 18   Ht 177.8 cm (70\")   Wt 112 kg (248 lb)   BMI 35.58 kg/m²     Physical Exam    Assessment:         Diagnoses and all orders for this visit:    1. Atrial fibrillation, persistent (Primary)  -     Basic Metabolic Panel; Future  -     Magnesium; Future    Other orders  -     amLODIPine (NORVASC) 2.5 MG tablet; Take 1 tablet by mouth Daily.  Dispense: 30 tablet; Refill: 11  -     losartan (Cozaar) 25 MG tablet; Take 1 tablet by mouth Daily.  Dispense: 90 tablet; Refill: 1           Plan:              The pleasure to be involved in this patient's cardiovascular care.  Please call with any questions or concerns  Aki Pitts MD, PhD    Most recent EKG as reviewed and interpreted by me:  Procedures     Most recent echo as reviewed and interpreted by me:  Results for orders placed during the hospital encounter of 08/29/23    Adult Transthoracic Echo " Complete W/ Cont if Necessary Per Protocol    Interpretation Summary    Left ventricular ejection fraction appears to be 56 - 60%.    The left atrial cavity is moderately dilated.      Most recent stress test as reviewed and interpreted by me:  Results for orders placed during the hospital encounter of 09/29/23    Stress Test With Myocardial Perfusion One Day    Interpretation Summary    Left ventricular ejection fraction is normal (Calculated EF = 59%).    Myocardial perfusion imaging indicates a moderate-sized infarct located in the basal inferior lateral wall with mild dickson-infarct ischemia.    Impressions are consistent with an intermediate risk study.    Findings consistent with a normal ECG stress test.      Most recent cardiac catheterization as reviewed interpreted by me:  Results for orders placed during the hospital encounter of 09/29/23    Cardiac Catheterization/Vascular Study    Conclusion  OPERATORS:  1.  Gemini Dyer M.D., Attending Cardiologist      PROCEDURE PERFORMED.  Ultrasound guided vascular access  Left heart catheterization  Coronary Angiogram 83433  Moderate Sedation    INDICATIONS FOR PROCEDURE.  Chest pain with positive stress test    PROCEDURE IN DETAIL.  Informed consent was obtained from the patient after explaining the risks, benefits, and alternative options of the procedure. After obtaining informed consent, the patient was brought to the cath lab and was prepped in a sterile fashion. Lidocaine 1% was used for local anesthesia into the right radial access site. The right radial artery was accessed under direct ultrasound visualization  with an angiocath needle via modified Seldinger technique. A 6F slender sheath was inserted successfully. Afterwards, 6F JR4  was advanced over a wire into the ascending aorta and used to cross the AV and obtain LV pressures.  AV gradient obtained via pullback technique. JR4 and JL3.5 diagnostic catheters were used to engage the ostia of the RCA and  LM respectively. Images of the right and left coronary systems were obtained. All the catheters were exchanged over a wire and subsequently removed. The patient tolerated the procedure well without any complications. The pictures were reviewed at the end of the procedure. TR band applied to right wrist for hemostasis and inflated with 15 cc of air. No complications were encountered.    HEMODYNAMICS.  LV: 107/10/14  AO: 104/82/92  No significant gradient across the aortic valve during pullback of JR4 catheter.  LV gram was not performed due to recent echocardiogram.    FINDINGS.    Coronary Angiogram.    1. Left main. Left main is a large-caliber vessel which gives rise to the Left Anterior Descending and the Left circumflex.  Left main is angiographically free from any significant disease    2. Left Anterior Descending Artery. LAD is a large vessel which gives rise to several septal perforators and several diagonal branches. It is angiographically free from any significant disease.  There is slow flow in the LAD    3. Left Circumflex. The LCx is a large-caliber codominant vessel which gives rise to marginals and actually supplies a part of the apex.  Left circumflex artery is angiographically free from any significant disease. there is slow flow in the left circumflex.    4. Right Coronary Artery. The RCA is a large-caliber Co-dominant vessel which gives rise to several small caliber branches including PDA. Right coronary artery is angiographically free from any significant disease.  There is slow flow in the RCA.    IMPRESSIONS.  No evidence of obstructive CAD  Large caliber vessels.  Slow flow in coronary arteries  Normal LVEDP    RECOMMENDATIONS.  1. Continue aggressive risk factor modification for primary prevention.  2. Exercise and lifestyle modifications recommended.  Can consider calcium channel blockers or long-acting nitrates if he continues to have chest pain given slow flow in his coronary  arteries    The following portions of the patient's history were reviewed and updated as appropriate: allergies, current medications, past family history, past medical history, past social history, past surgical history, and problem list.      ROS:  14 point review of systems negative except as mentioned above    Current Outpatient Medications:     atenolol (TENORMIN) 25 MG tablet, Take 1 tablet by mouth Daily., Disp: , Rfl:     famotidine (PEPCID) 40 MG tablet, Take 1 tablet by mouth Every Night., Disp: , Rfl:     LORazepam (ATIVAN) 1 MG tablet, Take 1 tablet by mouth Every Morning. And if needed 0.5 MG at night, prn, Disp: , Rfl:     nitroglycerin (NITROSTAT) 0.4 MG SL tablet, Place 1 tablet under the tongue Every 5 (Five) Minutes As Needed for Chest Pain (Only if SBP Greater Than 100). Take no more than 3 doses in 15 minutes., Disp: 30 tablet, Rfl: 12    pantoprazole (PROTONIX) 40 MG EC tablet, TAKE 1 TABLET BY MOUTH TWICE DAILY (Patient taking differently: Take 1 tablet by mouth Daily.), Disp: 60 tablet, Rfl: 0    rivaroxaban (XARELTO) 20 MG tablet, Take 1 tablet by mouth Daily., Disp: , Rfl:     zolpidem (AMBIEN) 10 MG tablet, Take 1 tablet by mouth At Night As Needed for Sleep., Disp: 30 tablet, Rfl: 3    amLODIPine (NORVASC) 2.5 MG tablet, Take 1 tablet by mouth Daily., Disp: 30 tablet, Rfl: 11    budesonide-formoterol (SYMBICORT) 160-4.5 MCG/ACT inhaler, Inhale 2 puffs 2 (Two) Times a Day. (Patient not taking: Reported on 5/30/2024), Disp: , Rfl:     losartan (Cozaar) 25 MG tablet, Take 1 tablet by mouth Daily., Disp: 90 tablet, Rfl: 1    Problem List:  Patient Active Problem List   Diagnosis    Sleep apnea    Primary idiopathic hypertrophic cardiomyopathy    Long term current use of anticoagulant    Hypertension    Atrial fibrillation, chronic    Chest pain    Abnormal nuclear stress test    Persistent atrial fibrillation    Atrial fibrillation, persistent    Screening for malignant neoplasm of colon     Dysphagia    Epigastric pain    Dyspepsia     Past Medical History:  Past Medical History:   Diagnosis Date    Abnormal ECG     Arrhythmia     Arthritis 12/11/2023    Asthma     Atrial fibrillation     Congenital heart disease     Coronary artery disease     Heart murmur     Hiatal hernia     Hyperlipidemia     Hypertension     Mental disorder 1984    Pulmonary arterial hypertension     Sleep apnea     CPap      Past Surgical History:  Past Surgical History:   Procedure Laterality Date    CARDIAC CATHETERIZATION Left 10/01/2023    Procedure: Left Heart Cath possible PCI;  Surgeon: Gemini Dyer MD;  Location: [facility] CATH INVASIVE LOCATION;  Service: Cardiology;  Laterality: Left;    CARDIAC CATHETERIZATION  10/1/2023    CARDIAC ELECTROPHYSIOLOGY PROCEDURE Right 1/26/2024    Procedure: Ablation atrial fibrillation/aflutter, rep emailed;  Surgeon: Cortez Navarro MD;  Location: [facility] CATH INVASIVE LOCATION;  Service: Cardiovascular;  Laterality: Right;    CARDIOVERSION  12/28/2023    unsuccessful    COLONOSCOPY N/A 12/29/2023    Procedure: COLONOSCOPY WITH POLYPECTOMY X4;  Surgeon: PHAN De León MD;  Location: [facility] ENDOSCOPY;  Service: Gastroenterology;  Laterality: N/A;  polyps, hemorrhoids    ENDOSCOPY N/A 12/29/2023    Procedure: ESOPHAGOGASTRODUODENOSCOPY WITH DILATION (BOUGIE 54, 58) AND BIOPSY X1 AREA;  Surgeon: PHAN De León MD;  Location: [facility] ENDOSCOPY;  Service: Gastroenterology;  Laterality: N/A;  hiatal hernia, r/o candida    RETINAL DETACHMENT SURGERY      TONSILLECTOMY  1973    VITRECTOMY PARS PLANA Left 08/11/2020    Procedure: VITRECTOMY PARS PLANA  25GA  WITH ENDOLASER AND  FLUID GAS EXCHANGE LEFT EYE;  Surgeon: Lazarus, Howard S., MD;  Location: [facility] MAIN OR;  Service: Ophthalmology;  Laterality: Left;     Social History:  Social History     Socioeconomic History    Marital status:    Tobacco Use    Smoking status: Never     Passive exposure: Never    Smokeless tobacco:  Never   Vaping Use    Vaping status: Never Used   Substance and Sexual Activity    Alcohol use: No    Drug use: Never    Sexual activity: Defer     Partners: Female     Birth control/protection: None     Allergies:  Allergies   Allergen Reactions    Latex Rash     Bad rash    Wound Dressing Adhesive Rash     Rash in the region of EKG leads     Immunizations:  Immunization History   Administered Date(s) Administered    Flu Vaccine Quad PF 6-35MO 09/29/2017, 09/28/2018, 09/26/2019    Flublock Quad =>18yrs 08/30/2020    H1N1 Inj 10/29/2009    Hepatitis A 04/21/2018, 10/21/2018    Influenza Seasonal Injectable 09/22/2023    Influenza Split Preservative Free ID 12/03/2012, 11/25/2013, 10/27/2014    Tdap 08/09/2019            In-Office Procedure(s):  No orders to display        ASCVD RIsk Score::  The ASCVD Risk score (Grant [id number], et al., 2019) failed to calculate for the following reasons:    The patient has a prior MI or stroke diagnosis    Imaging:    Results for orders placed during the hospital encounter of 09/29/23    XR Chest 1 View    Narrative  XR CHEST 1 VW    Date of Exam: 9/29/2023 3:07 PM EDT    Indication: chest pain    Comparison: None available.    FINDINGS:  No consolidations or pleural effusions are observed. The cardiac silhouette is within normal limits for size. The mediastinum is unremarkable. No acute osseous abnormalities are identified on this single view.    Impression  1.No evidence for acute cardiopulmonary process.    Electronically Signed: Kevin Fuller MD  9/29/2023 3:09 PM EDT  Workstation ID: [id number]       Results for orders placed during the hospital encounter of 12/11/23    CT Abdomen With Contrast    Narrative  CT ABDOMEN W CONTRAST    Date of Exam: 12/11/2023 2:33 PM EST    Indication: abd pain.    Comparison: None available.    Technique: Axial CT images were obtained of the abdomen after the uneventful intravenous administration of iodinated contrast. Sagittal and coronal  reconstructions were performed.  Automated exposure control and iterative reconstruction methods were  used.      Findings:  Heart size is within normal limits. Mild coronary artery calcifications are seen. Benign calcified nodules are present in the lower lobes and left upper lobe. No acute basilar airspace disease. There is a small esophageal hiatal hernia.    Liver, gallbladder, spleen, pancreas, adrenals are normal. Small low-density bilateral renal lesions are statistically favored represent cysts. 2 mm nonobstructing stone is seen in the left lower renal pole. No ureteral stone or hydronephrosis is  evident.    The appendix is normal. The bowel does not appear abnormally thickened, dilated, or inflamed.      Bilateral L5 spondylitic defects are present, without spondylolisthesis. Left SI joint spurring. Mild send mid to lower lumbar facet arthropathy. No acute or suspicious osseous abnormality.    Impression  1. No acute findings within the abdomen.  2. Small esophageal hiatal hernia.  3. Bilateral L5 spondylolysis without spondylolisthesis.    Electronically Signed: Nava Juarez MD  12/11/2023 4:00 PM EST  Workstation ID: [id number]      Results for orders placed during the hospital encounter of 12/11/23    CT Abdomen With Contrast    Narrative  CT ABDOMEN W CONTRAST    Date of Exam: 12/11/2023 2:33 PM EST    Indication: abd pain.    Comparison: None available.    Technique: Axial CT images were obtained of the abdomen after the uneventful intravenous administration of iodinated contrast. Sagittal and coronal reconstructions were performed.  Automated exposure control and iterative reconstruction methods were  used.      Findings:  Heart size is within normal limits. Mild coronary artery calcifications are seen. Benign calcified nodules are present in the lower lobes and left upper lobe. No acute basilar airspace disease. There is a small esophageal hiatal hernia.    Liver, gallbladder, spleen, pancreas,  adrenals are normal. Small low-density bilateral renal lesions are statistically favored represent cysts. 2 mm nonobstructing stone is seen in the left lower renal pole. No ureteral stone or hydronephrosis is  evident.    The appendix is normal. The bowel does not appear abnormally thickened, dilated, or inflamed.      Bilateral L5 spondylitic defects are present, without spondylolisthesis. Left SI joint spurring. Mild send mid to lower lumbar facet arthropathy. No acute or suspicious osseous abnormality.    Impression  1. No acute findings within the abdomen.  2. Small esophageal hiatal hernia.  3. Bilateral L5 spondylolysis without spondylolisthesis.    Electronically Signed: Nava Juarez MD  12/11/2023 4:00 PM EST  Workstation ID: [id number]      Lab Review:   Admission on 01/26/2024, Discharged on 01/26/2024   Component Date Value    Activated Clotting Time  01/26/2024 239 (H)     Activated Clotting Time  01/26/2024 336 (H)     Activated Clotting Time  01/26/2024 298 (H)     Activated Clotting Time  01/26/2024 331 (H)     Activated Clotting Time  01/26/2024 331 (H)     Activated Clotting Time  01/26/2024 396 (H)     Activated Clotting Time  01/26/2024 309 (H)     Activated Clotting Time  01/26/2024 352 (H)     Activated Clotting Time  01/26/2024 342 (H)    Lab on 01/24/2024   Component Date Value    Protime 01/24/2024 13.4 (H)     INR 01/24/2024 1.25 (H)     Glucose 01/24/2024 81     BUN 01/24/2024 15     Creatinine 01/24/2024 1.22     Sodium 01/24/2024 142     Potassium 01/24/2024 4.6     Chloride 01/24/2024 104     CO2 01/24/2024 27.4     Calcium 01/24/2024 9.8     Total Protein 01/24/2024 7.1     Albumin 01/24/2024 4.4     ALT (SGPT) 01/24/2024 41     AST (SGOT) 01/24/2024 27     Alkaline Phosphatase 01/24/2024 90     Total Bilirubin 01/24/2024 0.5     Globulin 01/24/2024 2.7     A/G Ratio 01/24/2024 1.6     BUN/Creatinine Ratio 01/24/2024 12.3     Anion Gap 01/24/2024 10.6     eGFR 01/24/2024 68.3      Magnesium 01/24/2024 2.3     WBC 01/24/2024 6.14     RBC 01/24/2024 4.98     Hemoglobin 01/24/2024 15.3     Hematocrit 01/24/2024 44.3     MCV 01/24/2024 89.0     MCH 01/24/2024 30.7     MCHC 01/24/2024 34.5     RDW 01/24/2024 12.7     RDW-SD 01/24/2024 41.1     MPV 01/24/2024 11.6     Platelets 01/24/2024 266     Neutrophil % 01/24/2024 51.1     Lymphocyte % 01/24/2024 28.8     Monocyte % 01/24/2024 12.2 (H)     Eosinophil % 01/24/2024 4.9     Basophil % 01/24/2024 1.5     Immature Grans % 01/24/2024 1.5 (H)     Neutrophils, Absolute 01/24/2024 3.14     Lymphocytes, Absolute 01/24/2024 1.77     Monocytes, Absolute 01/24/2024 0.75     Eosinophils, Absolute 01/24/2024 0.30     Basophils, Absolute 01/24/2024 0.09     Immature Grans, Absolute 01/24/2024 0.09 (H)     nRBC 01/24/2024 0.0    Admission on 12/26/2023, Discharged on 12/29/2023   Component Date Value    QT Interval 12/26/2023 410     QTC Interval 12/26/2023 412     Magnesium 12/26/2023 2.1     Glucose 12/26/2023 125 (H)     BUN 12/26/2023 12     Creatinine 12/26/2023 0.90     Sodium 12/26/2023 141     Potassium 12/26/2023 4.2     Chloride 12/26/2023 104     CO2 12/26/2023 25.0     Calcium 12/26/2023 9.5     BUN/Creatinine Ratio 12/26/2023 13.3     Anion Gap 12/26/2023 12.0     eGFR 12/26/2023 98.4     Glucose 12/27/2023 171 (H)     BUN 12/27/2023 10     Creatinine 12/27/2023 0.92     Sodium 12/27/2023 138     Potassium 12/27/2023 4.0     Chloride 12/27/2023 102     CO2 12/27/2023 25.0     Calcium 12/27/2023 9.1     BUN/Creatinine Ratio 12/27/2023 10.9     Anion Gap 12/27/2023 11.0     eGFR 12/27/2023 95.8     QT Interval 12/26/2023 429     QTC Interval 12/26/2023 462     QT Interval 12/27/2023 425     QTC Interval 12/27/2023 453     Extra Tube 12/27/2023 hold for add-on     QT Interval 12/27/2023 432     QTC Interval 12/27/2023 495     QT Interval 12/27/2023 449     QTC Interval 12/27/2023 534     Glucose 12/27/2023 111 (H)     BUN 12/27/2023 10      Creatinine 12/27/2023 0.97     Sodium 12/27/2023 141     Potassium 12/27/2023 3.9     Chloride 12/27/2023 104     CO2 12/27/2023 30.0 (H)     Calcium 12/27/2023 9.3     BUN/Creatinine Ratio 12/27/2023 10.3     Anion Gap 12/27/2023 7.0     eGFR 12/27/2023 89.9     QT Interval 12/28/2023 424     QTC Interval 12/28/2023 529     Magnesium 12/27/2023 2.1     QT Interval 12/29/2023 485     QTC Interval 12/29/2023 550     Magnesium 12/29/2023 2.4     Glucose 12/29/2023 91     BUN 12/29/2023 11     Creatinine 12/29/2023 0.88     Sodium 12/29/2023 140     Potassium 12/29/2023 3.8     Chloride 12/29/2023 100     CO2 12/29/2023 29.0     Calcium 12/29/2023 9.7     Total Protein 12/29/2023 7.5     Albumin 12/29/2023 4.4     ALT (SGPT) 12/29/2023 25     AST (SGOT) 12/29/2023 27     Alkaline Phosphatase 12/29/2023 74     Total Bilirubin 12/29/2023 0.6     Globulin 12/29/2023 3.1     A/G Ratio 12/29/2023 1.4     BUN/Creatinine Ratio 12/29/2023 12.5     Anion Gap 12/29/2023 11.0     eGFR 12/29/2023 99.1     WBC 12/29/2023 8.40     RBC 12/29/2023 5.24     Hemoglobin 12/29/2023 16.0     Hematocrit 12/29/2023 47.5     MCV 12/29/2023 90.6     MCH 12/29/2023 30.6     MCHC 12/29/2023 33.7     RDW 12/29/2023 13.9     RDW-SD 12/29/2023 45.9     MPV 12/29/2023 9.8     Platelets 12/29/2023 217     Neutrophil % 12/29/2023 60.6     Lymphocyte % 12/29/2023 22.4     Monocyte % 12/29/2023 11.9     Eosinophil % 12/29/2023 3.9     Basophil % 12/29/2023 1.2     Neutrophils, Absolute 12/29/2023 5.10     Lymphocytes, Absolute 12/29/2023 1.90     Monocytes, Absolute 12/29/2023 1.00 (H)     Eosinophils, Absolute 12/29/2023 0.30     Basophils, Absolute 12/29/2023 0.10     nRBC 12/29/2023 0.1     QT Interval 12/29/2023 423     QTC Interval 12/29/2023 548     Case Report 12/29/2023                      Value:Surgical Pathology Report                         Case: [id number]                                  Authorizing Provider:  PHAN De León MD        Collected:           12/29/2023 10:36 AM          Ordering Location:     [facility]  Received:            12/29/2023 01:13 PM                                 SUITES                                                                       Pathologist:           Baljeet Schmidt MD                                                            Specimens:   1) - Esophagus, Distal, r/o candida                                                                 2) - Large Intestine, Cecum, x1                                                                     3) - Large Intestine, Right / Ascending Colon, x1                                                   4) - Large Intestine, Transverse Colon, x2                                                 Final Diagnosis 12/29/2023                      Value:This result contains rich text formatting which cannot be displayed here.    Gross Description 12/29/2023                      Value:This result contains rich text formatting which cannot be displayed here.   Hospital Outpatient Visit on 12/11/2023   Component Date Value    Creatinine 12/11/2023 1.20     eGFR 12/11/2023 69.7      Recent labs reviewed and interpreted for clinical significance and application            Level of Care:           Aki Pitts MD  06/04/24  .

## 2024-06-14 ENCOUNTER — TELEPHONE (OUTPATIENT)
Dept: CARDIOLOGY | Facility: CLINIC | Age: 59
End: 2024-06-14
Payer: COMMERCIAL

## 2024-06-14 NOTE — TELEPHONE ENCOUNTER
Caller: Gamaliel Vogt    Relationship: Self    Best call back number: [phone]    What is the best time to reach you: ANYTIME     Who are you requesting to speak with (clinical staff, provider,  specific staff member): DR REDMOND'S NURSE     What was the call regarding: PT CHECKING TO SEE IF WE RECEIVED HIS LONG TERM DISABILITY PAPERWORK FROM [facility], PLEASE CALL PT BACK TO DISCUSS.     Is it okay if the provider responds through MyChart: CALL BACK

## 2024-06-24 ENCOUNTER — LAB (OUTPATIENT)
Dept: LAB | Facility: HOSPITAL | Age: 59
End: 2024-06-24
Payer: COMMERCIAL

## 2024-06-24 DIAGNOSIS — I48.19 ATRIAL FIBRILLATION, PERSISTENT: ICD-10-CM

## 2024-06-24 LAB
ANION GAP SERPL CALCULATED.3IONS-SCNC: 9.6 MMOL/L (ref 5–15)
BUN SERPL-MCNC: 16 MG/DL (ref 6–20)
BUN/CREAT SERPL: 11.8 (ref 7–25)
CALCIUM SPEC-SCNC: 10 MG/DL (ref 8.6–10.5)
CHLORIDE SERPL-SCNC: 104 MMOL/L (ref 98–107)
CO2 SERPL-SCNC: 29.4 MMOL/L (ref 22–29)
CREAT SERPL-MCNC: 1.36 MG/DL (ref 0.76–1.27)
EGFRCR SERPLBLD CKD-EPI 2021: 59.9 ML/MIN/1.73
GLUCOSE SERPL-MCNC: 96 MG/DL (ref 65–99)
MAGNESIUM SERPL-MCNC: 2.4 MG/DL (ref 1.6–2.6)
POTASSIUM SERPL-SCNC: 4.4 MMOL/L (ref 3.5–5.2)
SODIUM SERPL-SCNC: 143 MMOL/L (ref 136–145)

## 2024-06-24 PROCEDURE — 83735 ASSAY OF MAGNESIUM: CPT

## 2024-06-24 PROCEDURE — 80048 BASIC METABOLIC PNL TOTAL CA: CPT

## 2024-06-24 PROCEDURE — 36415 COLL VENOUS BLD VENIPUNCTURE: CPT

## 2024-07-22 ENCOUNTER — TELEMEDICINE (OUTPATIENT)
Dept: SLEEP MEDICINE | Facility: CLINIC | Age: 59
End: 2024-07-22
Payer: COMMERCIAL

## 2024-07-22 DIAGNOSIS — G47.21 CIRCADIAN RHYTHM SLEEP DISORDER, DELAYED SLEEP PHASE TYPE: ICD-10-CM

## 2024-07-22 DIAGNOSIS — G47.33 SEVERE OBSTRUCTIVE SLEEP APNEA: Primary | ICD-10-CM

## 2024-07-22 PROCEDURE — 99214 OFFICE O/P EST MOD 30 MIN: CPT | Performed by: FAMILY MEDICINE

## 2024-07-22 NOTE — PROGRESS NOTES
Follow Up [facility] Disorders Center Note     Chief Complaint:  [place]    The provider is located in [place] using a secure MyChart Video Visit through [employer]. Patient is being seen remotely via telehealth at their home address in [place], and stated that they are in a secure environment for this session. The patient's condition being diagnosed/treated is appropriate for telemedicine. The provider has identified herself as well as her credentials. The patient and/or patient's guardian, consent to be seen remotely, and when consent is given they understand that the consent allows for patient identifiable information to be sent to a third party as needed. They may refuse to be seen remotely at any time. The electronic data is encrypted and password protected, and the patient and/or guardian has been advised of the potential risks to privacy not withstanding such measures.   PT identifiers used: Name and .     You have chosen to receive care through a telehealth visit. Do you consent to use a video/audio connection for your medical care today? Yes.    Primary Care Physician: Jason Wtit    Interval History:   The patient is a 58 y.o. male  who was last seen in the sleep lab: 2024.  Split study 2024 showed AHI 33.9 lowest SpO2 86%.  Optimal control was not achieved during titration portion of split study; auto BiPAP was recommended.  At last visit was reporting improvement with increased usage over time.  I kept the settings the same at last visit.  Presents today for 3-month follow-up.    Downloaded PAP Data Reviewed For Compliance:  DME is [facility].  Downloads between 24-24.  Average usage is 3h 33m.  Average AHI is 2.0.  Average auto CPAP pressure is 15.8/11.9 cm H2O    I have reviewed the above results and compared them with the patient's last downloads and reviewed with the patient.    Review of Systems:    A complete review of systems was done and all were negative with the exception of all  negative2    Social History:    Social History     Socioeconomic History    Marital status:    Tobacco Use    Smoking status: Never     Passive exposure: Never    Smokeless tobacco: Never   Vaping Use    Vaping status: Never Used   Substance and Sexual Activity    Alcohol use: No    Drug use: Never    Sexual activity: Defer     Partners: Female     Birth control/protection: None       Allergies:  Latex and Wound dressing adhesive     Medication Review:  Reviewed.      Impression:   1. Severe obstructive sleep apnea    2. Circadian rhythm sleep disorder, delayed sleep phase type        Obstructive sleep apnea adequately treated with Auto BIPAP . The patient appears to be at goal with good compliance and usage. The patient has improved complaints of hypersomnolence. Difficulty with sleep hygiene and sleep schedule due to delayed phase circadian rhythm disorder and chronic pain disorder.     Plan:  Good sleep hygiene measures should be maintained.      After evaluating the patient and assessing results available, the patient is benefiting from the treatment being provided.     The patient will continue auto BIPAP.  After clinical evaluation and review of downloads, I recommend no changes to the patient's pressures.  A new prescription will be sent to the patient's DME.    Caution during activities that require prolonged concentration is strongly advised if sleepiness returns. Changing of PAP supplies regularly is important for effective use. Patient needs to change cushion on the mask or plugs on nasal pillows along with disposable filters once every month and change mask frame, tubing, headgear and Velcro straps every 6 months at the minimum.    Referral placed to [facility] psychology group for CBT for delayed phase circadian rhythm disorder.     I answered all of the patient's questions.  The patient will call for any problems and will follow up in 6 months.      Rupinder Larsen MD  Sleep  Medicine  07/22/24  09:57 EDT

## 2024-08-02 RX ORDER — PANTOPRAZOLE SODIUM 40 MG/1
40 TABLET, DELAYED RELEASE ORAL DAILY
Qty: 90 TABLET | Refills: 1 | Status: SHIPPED | OUTPATIENT
Start: 2024-08-02

## 2024-08-07 ENCOUNTER — TELEPHONE (OUTPATIENT)
Dept: CARDIOLOGY | Facility: CLINIC | Age: 59
End: 2024-08-07
Payer: COMMERCIAL

## 2024-08-07 NOTE — TELEPHONE ENCOUNTER
Caller: Gamaliel Vogt    Relationship to patient: Self    Best call back number: [phone]    New or established patient?  [] New  [x] Established    Date of discharge: 8/7/24    Facility discharged from: [facility]    Diagnosis/Symptoms: CHEST PAIN    Specialty Only: Did you see a [facility] health provider?    [] Yes  [x] No      PT IS CALLING TO SEE IF HE NEEDS TO SCHEDULE A FOLLOW UP APPT WITH DR. REDMOND. HE ALSO WANTED TO SEE ABOUT GETTING A GENETICS TEST REGRDING HIS HEART

## 2024-08-22 RX ORDER — PANTOPRAZOLE SODIUM 40 MG/1
40 TABLET, DELAYED RELEASE ORAL DAILY
Qty: 90 TABLET | Refills: 1 | Status: SHIPPED | OUTPATIENT
Start: 2024-08-22

## 2024-08-22 NOTE — TELEPHONE ENCOUNTER
Caller: Sin Gamaliel L    Relationship: Self    Best call back number: [phone]     Requested Prescriptions:   Requested Prescriptions     Pending Prescriptions Disp Refills    pantoprazole (PROTONIX) 40 MG EC tablet 90 tablet 1     Sig: Take 1 tablet by mouth Daily.        Pharmacy where request should be sent: [id number] - [place], [place] - [address] [place] [address] - [phone] [facility] [phone] FX     Last office visit with prescribing clinician: 5/30/2024   Last telemedicine visit with prescribing clinician: Visit date not found   Next office visit with prescribing clinician: 12/4/2024     Additional details provided by patient: PT STATES HE HAS ONE TABLET LEFT FOR TONIGHT - HIS DOSAGE WAS INCREASED TO TWICE A DAY - PT REQUESTING 90 DAY SUPPLY -     Does the patient have less than a 3 day supply:  [x] Yes  [] No    Would you like a call back once the refill request has been completed: [x] Yes [] No    If the office needs to give you a call back, can they leave a voicemail: [x] Yes [] No    Alejandro Aceves Rep   08/22/24 13:25 EDT

## 2024-08-28 ENCOUNTER — OFFICE (OUTPATIENT)
Dept: URBAN - METROPOLITAN AREA CLINIC 64 | Facility: CLINIC | Age: 59
End: 2024-08-28
Payer: MEDICARE

## 2024-08-28 ENCOUNTER — OFFICE (OUTPATIENT)
Age: 59
End: 2024-08-28
Payer: MEDICARE

## 2024-08-28 VITALS
SYSTOLIC BLOOD PRESSURE: 112 MMHG | HEIGHT: 70 IN | SYSTOLIC BLOOD PRESSURE: 112 MMHG | WEIGHT: 251 LBS | WEIGHT: 251 LBS | HEIGHT: 70 IN | HEART RATE: 67 BPM | WEIGHT: 251 LBS | DIASTOLIC BLOOD PRESSURE: 77 MMHG | SYSTOLIC BLOOD PRESSURE: 112 MMHG | HEIGHT: 70 IN | DIASTOLIC BLOOD PRESSURE: 77 MMHG | HEART RATE: 67 BPM | WEIGHT: 251 LBS | WEIGHT: 251 LBS | SYSTOLIC BLOOD PRESSURE: 112 MMHG | HEIGHT: 70 IN | HEART RATE: 67 BPM | HEART RATE: 67 BPM | DIASTOLIC BLOOD PRESSURE: 77 MMHG | HEART RATE: 67 BPM | HEIGHT: 70 IN | HEIGHT: 70 IN | WEIGHT: 251 LBS | SYSTOLIC BLOOD PRESSURE: 112 MMHG | WEIGHT: 251 LBS | HEART RATE: 67 BPM | SYSTOLIC BLOOD PRESSURE: 112 MMHG | SYSTOLIC BLOOD PRESSURE: 112 MMHG | HEIGHT: 70 IN | DIASTOLIC BLOOD PRESSURE: 77 MMHG | DIASTOLIC BLOOD PRESSURE: 77 MMHG | HEART RATE: 67 BPM | DIASTOLIC BLOOD PRESSURE: 77 MMHG | DIASTOLIC BLOOD PRESSURE: 77 MMHG

## 2024-08-28 DIAGNOSIS — R10.12 LEFT UPPER QUADRANT PAIN: ICD-10-CM

## 2024-08-28 DIAGNOSIS — R13.10 DYSPHAGIA, UNSPECIFIED: ICD-10-CM

## 2024-08-28 DIAGNOSIS — R10.13 EPIGASTRIC PAIN: ICD-10-CM

## 2024-08-28 DIAGNOSIS — R14.0 ABDOMINAL DISTENSION (GASEOUS): ICD-10-CM

## 2024-08-28 PROCEDURE — 99213 OFFICE O/P EST LOW 20 MIN: CPT

## 2024-08-28 RX ORDER — SUCRALFATE 1 G/1
TABLET ORAL
Qty: 42 | Refills: 0 | Status: ACTIVE
Start: 2024-08-28

## 2024-09-06 ENCOUNTER — OFFICE VISIT (OUTPATIENT)
Dept: CARDIOLOGY | Facility: CLINIC | Age: 59
End: 2024-09-06
Payer: MEDICAID

## 2024-09-06 VITALS
RESPIRATION RATE: 18 BRPM | DIASTOLIC BLOOD PRESSURE: 82 MMHG | OXYGEN SATURATION: 97 % | HEART RATE: 58 BPM | HEIGHT: 70 IN | SYSTOLIC BLOOD PRESSURE: 138 MMHG | WEIGHT: 254 LBS | BODY MASS INDEX: 36.36 KG/M2

## 2024-09-06 DIAGNOSIS — I48.20 ATRIAL FIBRILLATION, CHRONIC: ICD-10-CM

## 2024-09-06 DIAGNOSIS — Z09 FOLLOW-UP EXAM: Primary | ICD-10-CM

## 2024-09-06 DIAGNOSIS — I10 PRIMARY HYPERTENSION: ICD-10-CM

## 2024-09-06 PROCEDURE — 99214 OFFICE O/P EST MOD 30 MIN: CPT | Performed by: NURSE PRACTITIONER

## 2024-09-06 PROCEDURE — 3075F SYST BP GE 130 - 139MM HG: CPT | Performed by: NURSE PRACTITIONER

## 2024-09-06 PROCEDURE — 93000 ELECTROCARDIOGRAM COMPLETE: CPT | Performed by: NURSE PRACTITIONER

## 2024-09-06 PROCEDURE — 3079F DIAST BP 80-89 MM HG: CPT | Performed by: NURSE PRACTITIONER

## 2024-09-06 RX ORDER — SUCRALFATE 1 G/1
1 TABLET ORAL 4 TIMES DAILY
COMMUNITY
Start: 2024-08-28

## 2024-09-12 RX ORDER — ISOSORBIDE MONONITRATE 30 MG/1
30 TABLET, EXTENDED RELEASE ORAL DAILY
Qty: 90 TABLET | Refills: 3 | Status: SHIPPED | OUTPATIENT
Start: 2024-09-12

## 2024-09-26 ENCOUNTER — TELEPHONE (OUTPATIENT)
Dept: CARDIOLOGY | Facility: CLINIC | Age: 59
End: 2024-09-26
Payer: MEDICAID

## 2024-09-27 RX ORDER — FUROSEMIDE 20 MG
20 TABLET ORAL DAILY PRN
Qty: 30 TABLET | Refills: 3 | Status: SHIPPED | OUTPATIENT
Start: 2024-09-27

## 2025-01-20 RX ORDER — FUROSEMIDE 20 MG/1
20 TABLET ORAL DAILY PRN
Qty: 90 TABLET | Refills: 1 | Status: SHIPPED | OUTPATIENT
Start: 2025-01-20

## (undated) DEVICE — GLIDESHEATH SLENDER ACCESS KIT: Brand: GLIDESHEATH SLENDER

## (undated) DEVICE — CABL CONN ABL CARTO3 12TO34PIN 3M BLU

## (undated) DEVICE — GW TRNSEP SAFESEPT LT/ATRIAL RO 135CM .014IN

## (undated) DEVICE — INTRO STEER AGILIS NXT MED/CURL 8.5F

## (undated) DEVICE — KT EYE SURG PERFLUORON/7ML

## (undated) DEVICE — CANN RETINA SFT TP 25G

## (undated) DEVICE — ELECTRD DEFIB M/FUNC PROPADZ RADIOL 2PK

## (undated) DEVICE — Device

## (undated) DEVICE — CATH ULTRASND SOUNDSTAR GE 3D 8F 90CM

## (undated) DEVICE — SNAR POLYP HOTSNARE/BRAIDED OVL/MINI 7F 2.8X10MM 230CM 1P/U

## (undated) DEVICE — CATH DIAG IMPULSE FL3.5 6F 100CM

## (undated) DEVICE — PK TRY HEART CATH 50

## (undated) DEVICE — NDL TRNSEP BRK XS LNG 18G 98CM A/

## (undated) DEVICE — GW EMR FIX EXCHG J STD .035 3MM 260CM

## (undated) DEVICE — SYR ANGIO FNGR FIX CONTRL MLL 10ML

## (undated) DEVICE — TRAP WIDEEYE POLYP

## (undated) DEVICE — CYL GAS SF6 10DOSE

## (undated) DEVICE — KT GAS UNIV

## (undated) DEVICE — PROB DIR LASR 25G STRL

## (undated) DEVICE — PK VITRECT 50

## (undated) DEVICE — SYS CABL IF CARTO3 ECO 10FT

## (undated) DEVICE — SHLD EYE ALUM W/GARTER

## (undated) DEVICE — GLV SURG SENSICARE SLT PF LF 7.5 STRL

## (undated) DEVICE — SYR LL TP 10ML STRL

## (undated) DEVICE — SINGLE-USE BIOPSY FORCEPS: Brand: RADIAL JAW 4

## (undated) DEVICE — NDL HYPO PRECISIONGLIDE REG 25G 1 1/2

## (undated) DEVICE — PTCH MP CARTO3 EXT REF

## (undated) DEVICE — TBG IV DRIP CHAMBER MACRO SGL 72IN

## (undated) DEVICE — PK VITRECT EVA VGPC 25G

## (undated) DEVICE — PAD E/S GRND SGL/FOIL 9FT/CORD DISP

## (undated) DEVICE — TBG CONN IRR SMARTABLATE PUMP 1P/U

## (undated) DEVICE — SHEATH INTRO PINN CORNRY .038 6F10CM

## (undated) DEVICE — SHT AIR TRANSFR COMFRT GLIDE LAT 40X80IN

## (undated) DEVICE — CATH DIAG IMPULSE FR4 6F 100CM

## (undated) DEVICE — ST INTRO PERFORMER W/GW J/TP .038IN 14FR

## (undated) DEVICE — PK ENDO GI 50

## (undated) DEVICE — NDL HYPO PRECISIONGLIDE/REG 18G 1IN PNK

## (undated) DEVICE — SHEATH INTRO PINN CORNRY .038 9F 10CM

## (undated) DEVICE — SYS CLS VASC/VENI VASCADE MVP 6TO12F

## (undated) DEVICE — BLANKT WARM UNDER/BDY FUL/ACC A/ 90X206CM

## (undated) DEVICE — CVR PROB ULTRASND W/GEL STRL 5X96IN

## (undated) DEVICE — PK PROC TURNOVER

## (undated) DEVICE — TR BAND RADIAL ARTERY COMPRESSION DEVICE: Brand: TR BAND

## (undated) DEVICE — INST SHIRAGA BACKFLUSH W/EXT TP ACT ASP 25G

## (undated) DEVICE — CABL EXT FOR/QDOT/MICRO TX ECO REUS

## (undated) DEVICE — SOL IRRIG H2O 1000ML STRL

## (undated) DEVICE — BITEBLOCK ENDO W/STRAP 60F A/ LF DISP